# Patient Record
Sex: FEMALE | Race: BLACK OR AFRICAN AMERICAN | NOT HISPANIC OR LATINO | Employment: OTHER | URBAN - METROPOLITAN AREA
[De-identification: names, ages, dates, MRNs, and addresses within clinical notes are randomized per-mention and may not be internally consistent; named-entity substitution may affect disease eponyms.]

---

## 2017-09-06 ENCOUNTER — TELEPHONE (OUTPATIENT)
Dept: PAIN MEDICINE | Facility: CLINIC | Age: 57
End: 2017-09-06

## 2017-09-06 ENCOUNTER — OFFICE VISIT (OUTPATIENT)
Dept: PAIN MEDICINE | Facility: CLINIC | Age: 57
End: 2017-09-06
Payer: MEDICARE

## 2017-09-06 ENCOUNTER — HOSPITAL ENCOUNTER (OUTPATIENT)
Dept: RADIOLOGY | Facility: HOSPITAL | Age: 57
Discharge: HOME | End: 2017-09-06
Attending: ANESTHESIOLOGY
Payer: MEDICARE

## 2017-09-06 VITALS — HEIGHT: 68 IN | DIASTOLIC BLOOD PRESSURE: 84 MMHG | HEART RATE: 58 BPM | SYSTOLIC BLOOD PRESSURE: 136 MMHG

## 2017-09-06 DIAGNOSIS — M51.36 DDD (DEGENERATIVE DISC DISEASE), LUMBAR: ICD-10-CM

## 2017-09-06 DIAGNOSIS — M47.819 FACET ARTHROPATHY: ICD-10-CM

## 2017-09-06 DIAGNOSIS — M47.819 FACET ARTHROPATHY: Primary | ICD-10-CM

## 2017-09-06 PROCEDURE — 72110 X-RAY EXAM L-2 SPINE 4/>VWS: CPT | Mod: TC

## 2017-09-06 PROCEDURE — 99999 PR PBB SHADOW E&M-NEW PATIENT-LVL III: CPT | Mod: PBBFAC,,, | Performed by: ANESTHESIOLOGY

## 2017-09-06 PROCEDURE — 72050 X-RAY EXAM NECK SPINE 4/5VWS: CPT | Mod: TC

## 2017-09-06 PROCEDURE — 72050 X-RAY EXAM NECK SPINE 4/5VWS: CPT | Mod: 26,,, | Performed by: RADIOLOGY

## 2017-09-06 PROCEDURE — 72110 X-RAY EXAM L-2 SPINE 4/>VWS: CPT | Mod: 26,,, | Performed by: RADIOLOGY

## 2017-09-06 PROCEDURE — 99204 OFFICE O/P NEW MOD 45 MIN: CPT | Mod: S$GLB,,, | Performed by: ANESTHESIOLOGY

## 2017-09-06 RX ORDER — KETOCONAZOLE 20 MG/G
CREAM TOPICAL DAILY
COMMUNITY
End: 2018-06-18 | Stop reason: SDUPTHER

## 2017-09-06 RX ORDER — ASPIRIN 81 MG/1
81 TABLET ORAL DAILY
COMMUNITY

## 2017-09-06 RX ORDER — NEOMYCIN SULFATE, POLYMYXIN B SULFATE AND DEXAMETHASONE 3.5; 10000; 1 MG/ML; [USP'U]/ML; MG/ML
1 SUSPENSION/ DROPS OPHTHALMIC
COMMUNITY
End: 2019-10-31

## 2017-09-06 RX ORDER — ATORVASTATIN CALCIUM 10 MG/1
10 TABLET, FILM COATED ORAL DAILY
COMMUNITY
End: 2019-10-31 | Stop reason: SDUPTHER

## 2017-09-06 RX ORDER — BACLOFEN 10 MG/1
10 TABLET ORAL 3 TIMES DAILY
COMMUNITY
End: 2018-06-18 | Stop reason: SDUPTHER

## 2017-09-06 RX ORDER — OXYBUTYNIN CHLORIDE 15 MG/1
5 TABLET, EXTENDED RELEASE ORAL DAILY
COMMUNITY
End: 2019-10-31 | Stop reason: SDUPTHER

## 2017-09-06 RX ORDER — METFORMIN HYDROCHLORIDE 750 MG/1
750 TABLET, EXTENDED RELEASE ORAL
COMMUNITY
End: 2019-10-31 | Stop reason: SDUPTHER

## 2017-09-06 RX ORDER — LISINOPRIL 2.5 MG/1
2.5 TABLET ORAL DAILY
COMMUNITY
End: 2019-10-31 | Stop reason: SDUPTHER

## 2017-09-06 RX ORDER — NAPROXEN 500 MG/1
500 TABLET ORAL 2 TIMES DAILY
Status: ON HOLD | COMMUNITY
End: 2018-07-05

## 2017-09-06 NOTE — PROGRESS NOTES
This note was completed with dictation software and grammatical errors may exist.    Referring Physician: Self, Aaareferral    PCP: Katya Hummel MD      CC: neck and lower back pain    HPI:   Martha Miguel is a 56 y.o. female referred with neck and lower back pain.  Lower back pain is more bothersome.  Pain is present over 10 years.  She has constant aching, grabbing, deep pain in her lower back.  Pain radiates to her bilateral hips.  Pain worsens standing, walking, lifting, getting up.  Pain radiates up to her mid back and causes neck pain at times.  Pain improves with rest.  She takes baclofen, naproxen with mild benefits.  She has not had any updated imaging of her neck and lumbar spine.  She has not tried physical therapy.  She denies any weakness.  No bowel bladder changes.  She rates her pain 4/10 today, 8/10 on average.    ROS:  CONSTITUTIONAL: No fevers, chills, night sweats, wt. loss, appetite changes  SKIN: no rashes or itching  ENT: No headaches, head trauma, vision changes, or eye pain  LYMPH NODES: None noticed   CV: No chest pain, palpitations.   RESP: No shortness of breath, dyspnea on exertion, cough, wheezing, or hemoptysis  GI: No nausea, emesis, diarrhea, constipation, melena, hematochezia, pain.    : No dysuria, hematuria, urgency, or frequency   HEME: No easy bruising, bleeding problems  PSYCHIATRIC: No depression, anxiety, psychosis, hallucinations.  NEURO: No seizures, memory loss, dizziness or difficulty sleeping  MSK: + History of present illness      Past Medical History:   Diagnosis Date    Allergy     Arthritis     COPD (chronic obstructive pulmonary disease)     Coronary artery disease     Depression     Diabetes mellitus, type 2      Past Surgical History:   Procedure Laterality Date    CHOLECYSTECTOMY      FRACTURE SURGERY      HYSTERECTOMY      TONSILLECTOMY       Family History   Problem Relation Age of Onset    Family history unknown: Yes     Social History  "    Social History    Marital status:      Spouse name: N/A    Number of children: N/A    Years of education: N/A     Social History Main Topics    Smoking status: Never Smoker    Smokeless tobacco: Never Used    Alcohol use No    Drug use: No    Sexual activity: No     Other Topics Concern    None     Social History Narrative    None         Medications/Allergies: See med card    Vitals:    09/06/17 1036   BP: 136/84   Pulse: (!) 58   Height: 5' 8" (1.727 m)   PainSc:   2   PainLoc: Back         Physical exam:    GENERAL: A and O x3, the patient appears well groomed and is in no acute distress.  Skin: No rashes or obvious lesions  HEENT: normocephalic, atraumatic  CARDIOVASCULAR:  Palpable peripheral pulses  LUNGS: easy work of breathing  ABDOMEN: soft, nontender   UPPER EXTREMITIES: Normal alignment, normal range of motion, no atrophy, no skin changes,  hair growth and nail growth normal and equal bilaterally. No swelling, no tenderness.    LOWER EXTREMITIES:  Normal alignment, normal range of motion, no atrophy, no skin changes,  hair growth and nail growth normal and equal bilaterally. No swelling, no tenderness.  CERVICAL SPINE:  Cervical spine: ROM is full in flexion, extension and lateral rotation with mild increased pain.  Spurling's maneuver causes no neck pain to either side.  Myofascial exam: No Tenderness to palpation across cervical paraspinous region bilaterally.    LUMBAR SPINE  Lumbar spine: ROM is full with flexion extension and oblique extension with moderate increased pain.    Manuel's test causes no increased pain on either side.    Supine straight leg raise is negative bilaterally.    Internal and external rotation of the hip causes no increased pain on either side.  Myofascial exam: No tenderness to palpation across lumbar paraspinous muscles.      MENTAL STATUS: normal orientation, speech, language, and fund of knowledge for social situation.  Emotional state " appropriate.    CRANIAL NERVES:  II:  PERRL bilaterally,   III,IV,VI: EOMI.    V:  Facial sensation equal bilaterally  VII:  Facial motor function normal.  VIII:  Hearing equal to finger rub bilaterally  IX/X: Gag normal, palate symmetric  XI:  Shoulder shrug equal, head turn equal  XII:  Tongue midline without fasciculations      MOTOR: Tone and bulk: normal bilateral upper and lower Strength: normal   Delt Bi Tri WE WF     R 5 5 5 5 5 5   L 5 5 5 5 5 5     IP ADD ABD Quad TA Gas HAM  R 5 5 5 5 5 5 5  L 5 5 5 5 5 5 5    SENSATION: Light touch and pinprick intact bilaterally  REFLEXES: normal, symmetric, nonbrisk.  Toes down, no clonus. No hoffmans.  GAIT: normal rise, base, steps, and arm swing.        Imaging:  None    Assessment:  Patient referred for neck and lower back pain  1. Facet arthropathy    2. DDD (degenerative disc disease), lumbar          Plan:  - I have stressed the importance of physical activity and exercise to improve overall health. Referral given for formal PT  - Continue baclofen and naproxen as prescribed  - Schedule xrays of lumbar and cervical spine  - I believe her low back pain maybe due to facet arthropathy and have recommended lumbar medial branch blocks as a diagnostic procedure.  If successful, would proceed with radiofrequency ablation.  She will consider these options  - Follow up after trial of PT      Thank you for referring this interesting patient, and I look forward to continuing to collaborate in her care.

## 2017-09-13 ENCOUNTER — TELEPHONE (OUTPATIENT)
Dept: PAIN MEDICINE | Facility: CLINIC | Age: 57
End: 2017-09-13

## 2017-09-13 NOTE — TELEPHONE ENCOUNTER
----- Message from Marta Lubin sent at 9/13/2017 12:37 PM CDT -----  Contact: Sridevi from [facility] [phone]  Sridevi needs call back to clarify orders for physical therapy for this patient.call back [phone]

## 2018-06-18 ENCOUNTER — OFFICE VISIT (OUTPATIENT)
Dept: PAIN MEDICINE | Facility: CLINIC | Age: 58
End: 2018-06-18
Payer: MEDICARE

## 2018-06-18 VITALS
WEIGHT: 293 LBS | HEART RATE: 60 BPM | DIASTOLIC BLOOD PRESSURE: 79 MMHG | HEIGHT: 68 IN | BODY MASS INDEX: 44.41 KG/M2 | SYSTOLIC BLOOD PRESSURE: 132 MMHG

## 2018-06-18 DIAGNOSIS — M47.819 FACET ARTHROPATHY: Primary | ICD-10-CM

## 2018-06-18 DIAGNOSIS — M51.36 DDD (DEGENERATIVE DISC DISEASE), LUMBAR: ICD-10-CM

## 2018-06-18 PROCEDURE — 99214 OFFICE O/P EST MOD 30 MIN: CPT | Mod: S$GLB,,, | Performed by: PHYSICIAN ASSISTANT

## 2018-06-18 PROCEDURE — 3008F BODY MASS INDEX DOCD: CPT | Mod: CPTII,S$GLB,, | Performed by: PHYSICIAN ASSISTANT

## 2018-06-18 PROCEDURE — 99999 PR PBB SHADOW E&M-EST. PATIENT-LVL III: CPT | Mod: PBBFAC,,, | Performed by: PHYSICIAN ASSISTANT

## 2018-06-18 RX ORDER — CARVEDILOL 3.12 MG/1
12.5 TABLET ORAL 2 TIMES DAILY WITH MEALS
COMMUNITY
End: 2019-10-31 | Stop reason: SDUPTHER

## 2018-06-18 NOTE — PROGRESS NOTES
Referring Physician: No ref. provider found    PCP: Katya Hummel MD      CC: neck and lower back pain      Interval History:  Martha Miguel is a 57 y.o. female with neck and low back pain who presents today for f/u. [facility] physical therapy was ordered for 8 weeks. She went to 3 visits but could not tolerate physical therapy. She is apprehensive to try medial branch blocks because she has heard they are bad. She denies any changes in symptoms. Pain is chronic. Pain is throbbing in nature and worse in low back, midline. Denies any worsening symptoms. Denies b/b changes. Denies LE weakness. Pain today is rated 8/10.  Pt has been seen in the clinic before, however pt is new to me.     History below per Dr. Orellana    HPI:   Martha Miguel is a 57 y.o. female referred with neck and lower back pain.  Lower back pain is more bothersome.  Pain is present over 10 years.  She has constant aching, grabbing, deep pain in her lower back.  Pain radiates to her bilateral hips.  Pain worsens standing, walking, lifting, getting up.  Pain radiates up to her mid back and causes neck pain at times.  Pain improves with rest.  She takes baclofen, naproxen with mild benefits.  She has not had any updated imaging of her neck and lumbar spine.  She has not tried physical therapy.  She denies any weakness.  No bowel bladder changes.  She rates her pain 4/10 today, 8/10 on average.    ROS:  CONSTITUTIONAL: No fevers, chills, night sweats, wt. loss, appetite changes  SKIN: no rashes or itching  ENT: No headaches, head trauma, vision changes, or eye pain  LYMPH NODES: None noticed   CV: No chest pain, palpitations.   RESP: No shortness of breath, dyspnea on exertion, cough, wheezing, or hemoptysis  GI: No nausea, emesis, diarrhea, constipation, melena, hematochezia, pain.    : No dysuria, hematuria, urgency, or frequency   HEME: No easy bruising, bleeding problems  PSYCHIATRIC: No depression, anxiety, psychosis, hallucinations.  NEURO: No  "seizures, memory loss, dizziness or difficulty sleeping  MSK: + History of present illness      Past Medical History:   Diagnosis Date    Allergy     Arthritis     COPD (chronic obstructive pulmonary disease)     Coronary artery disease     Depression     Diabetes mellitus, type 2      Past Surgical History:   Procedure Laterality Date    CHOLECYSTECTOMY      FRACTURE SURGERY      HYSTERECTOMY      TONSILLECTOMY       Family History   Problem Relation Age of Onset    Family history unknown: Yes     Social History     Social History    Marital status:      Spouse name: N/A    Number of children: N/A    Years of education: N/A     Social History Main Topics    Smoking status: Never Smoker    Smokeless tobacco: Never Used    Alcohol use No    Drug use: No    Sexual activity: No     Other Topics Concern    None     Social History Narrative    None         Medications/Allergies: See med card    Vitals:    06/18/18 1336   BP: 132/79   Pulse: 60   Weight: (!) 165.6 kg (365 lb)   Height: 5' 8" (1.727 m)   PainSc:   8   PainLoc: Back         Physical exam:    GENERAL: A and O x3, the patient appears well groomed and is in no acute distress.  Skin: No rashes or obvious lesions  HEENT: normocephalic, atraumatic  CARDIOVASCULAR:  RRR  LUNGS: non labored breathing  ABDOMEN: soft, nontender   UPPER EXTREMITIES: Normal alignment, normal range of motion, no atrophy, no skin changes,  hair growth and nail growth normal and equal bilaterally. No swelling, no tenderness.    LOWER EXTREMITIES:  Normal alignment, normal range of motion, no atrophy, no skin changes,  hair growth and nail growth normal and equal bilaterally. No swelling, no tenderness.  CERVICAL SPINE:  Cervical spine: ROM is full in flexion, extension and lateral rotation with mild increased pain.  Spurling's maneuver causes no neck pain to either side.  Myofascial exam: mild Tenderness to palpation across cervical paraspinous region " bilaterally.    LUMBAR SPINE  Lumbar spine: ROM is full with flexion extension and oblique extension with moderate increased pain.    Manuel's test causes no increased pain on either side.    Supine straight leg raise is negative bilaterally.    Internal and external rotation of the hip causes no increased pain on either side.  Myofascial exam: moderate tenderness to palpation across lumbar paraspinous muscles.      MENTAL STATUS: normal orientation, speech, language, and fund of knowledge for social situation.  Emotional state appropriate.    CRANIAL NERVES:  II:  PERRL bilaterally,   III,IV,VI: EOMI.    V:  Facial sensation equal bilaterally  VII:  Facial motor function normal.  VIII:  Hearing equal to finger rub bilaterally  IX/X: Gag normal, palate symmetric  XI:  Shoulder shrug equal, head turn equal  XII:  Tongue midline without fasciculations      MOTOR: Tone and bulk: normal bilateral upper and lower Strength: normal   Delt Bi Tri WE WF     R 5 5 5 5 5 5   L 5 5 5 5 5 5     IP ADD ABD Quad TA Gas HAM  R 5 5 5 5 5 5 5  L 5 5 5 5 5 5 5    SENSATION: Light touch and pinprick intact bilaterally  REFLEXES: normal, symmetric, nonbrisk.  Toes down, no clonus. No hoffmans.  GAIT: normal rise, base, steps, and arm swing.        Imagin17  Lumbar xray  Moderate marginal vertebral spurring is present at lower thoracic and upper lumbar levels.  Moderate facet arthropathy is present at mid to lower lumbar levels.  Mild loss of disc height is present at T12-L1 and L1-L2.  No pars defect is seen.  Surgical clips are present in the right upper quadrant.    Cervical xray  Mild degenerative cervical spondylosis.  Assessment:  Martha Miguel is a 57 y.o. female with neck and lower back pain  1. Facet arthropathy    2. DDD (degenerative disc disease), lumbar      Plan:  1. I have stressed the importance of physical activity and exercise to improve overall health. Home exercises provided  2. Recommend formal PT.  Declines at this time  3. Reviewed imaging with Ms. Miguel  4.  I believe her low back pain maybe due to facet arthropathy and have recommended lumbar medial branch blocks as a diagnostic procedure.  If successful, would proceed with radiofrequency ablation.  I have explained the risks, benefits, and alternatives of the procedure in detail. The patient voices understanding and all questions have been answered. The patient agrees to proceed as planned. Written Consent obtained.   5. Will call s/p MARION

## 2018-06-18 NOTE — PATIENT INSTRUCTIONS
Exercises To Strengthen Your Lower Back  Strong lower-back and abdominal muscles work together to support your spine. These exercises will help strengthen the muscles of the lower back. It is important that you begin exercising slowly and increase levels gradually.  Always begin any exercise program with stretching. If you feel pain while doing any of these exercises, stop and talk to your doctor about a more specific exercise program that suits your condition better.  Low Back Stretch  · Lie on your back with your knees bent and both feet on the ground.  ·   Slowly raise your left knee to your chest as you flatten your lower back against the floor. Hold for 5 seconds.  · Relax and repeat the exercise with your right knee.  · Do 10 of these exercises for each leg.  · Repeat hugging both knees to your chest at the same time.  Building Lower Back Strength  1. Kneeling Lumbar Extension: Begin on your hands and knees. Simultaneously raise and straighten your right arm and left leg until they are parallel to the ground. Hold for 2 seconds and come back slowly to a starting position. Repeat with left arm and right leg, alternating 10 times.  2. Prone Lumbar Extension: Lie face down, arms extended overhead, palms on the floor. Simultaneously raise your right arm and left leg as high as comfortably possible. Hold for 10 seconds and slowly return to start. Repeat with left arm and right leg, alternating 10 times. Gradually build up to 20 times. (Advanced: Repeat this exercise raising both arms and both legs a few inches off the floor at the same time. Hold for 5 seconds and release.)  3. Pelvic Tilt: Lie on the floor on your back with your knees bent at 90 degrees. Your feet should be flat on the floor. Inhale, exhale, then slowly contract your abdominal muscles bringing your navel toward your spine. Let your pelvis rock back until your lower back is flat on the floor. Hold for 10 seconds while breathing  smoothly.  4. Abdominal Crunch: Perform a Pelvic Tilt (above) flattening your lower back against the floor. Holding the tension in your abdominal muscles, take another breath and raise your shoulder blades off the ground (this is not a full sit-up). Keep your head in line with your body (dont bend your neck forward). Hold for 2 seconds, then slowly lower.      Back Exercises: Abdominal Lift  The Abdominal Lift strengthens your lower abdominal muscles, helping you keep your pelvis and back stable.  · Lie on the floor with both knees bent. Put your feet flat on the floor and your arms by your sides. Tighten your abdominal muscles.  · Lift one bent knee and move it toward your upper body. Keep your abdominal muscles tight and your back flat on the floor. Hold for 10 seconds.  · Repeat 3 times. Then, switch legs.         Back Exercises: Bridge  The Bridge exercise strengthens your abdominal, buttocks, and hamstring muscles. This helps keep your back stable and aligned when you walk.  · Lie on the floor with your back and palms flat. Bend your knees. Keep your feet flat on the floor.  · Contract your abdominal and buttocks muscles. Slowly lift your buttocks off the floor until there is a straight line from your knees to your shoulders.  · Hold for 5  seconds. Repeat 10 times.          Back Exercises: Hip Lift        To start, lie on your back with your knees bent and feet flat on the floor. Dont press your neck or lower back to the floor. Breathe deeply. You should feel comfortable and relaxed in this position.  · Slowly raise your hips upward.  · Tighten your abdomen and buttocks. Be careful not to arch your back.  · Hold for 5 seconds. Lower your hips to the floor.  · Repeat 10 times.  For your safety, check with your healthcare provider before starting an exercise program.       Back Exercises: Hip Rotator Stretch        To start, lie on your back with your knees bent and feet flat on the floor. Dont press your  neck or lower back to the floor. Breathe deeply. You should feel comfortable and relaxed in this position.  · Rest your right ankle on your left knee.  · Place a towel behind your left thigh and use it to pull the knee toward your chest. Feel the stretch in your buttocks.  · Hold for 30-60 seconds. Release.  · Repeat 2 times.  · Switch legs.   For your safety, check with your healthcare provider before starting an exercise program.       Back Exercises: Knee Lift        To start, lie on your back with your knees bent and feet flat on the floor. Dont press your neck or lower back to the floor. Breathe deeply. You should feel comfortable and relaxed in this position.  · Lift one bent knee and move it toward your upper body. Keep your abdominal muscles tight and your back flat on the floor.  · Hold for 10 seconds. Return to start position.  · Repeat 3 times.  · Switch legs.        Back Exercises: Leg Pull        To start, lie on your back with your knees bent and feet flat on the floor. Dont press your neck or lower back to the floor. Breathe deeply. You should feel comfortable and relaxed in this position.  · Pull one knee to your chest.  · Hold for 30-60 seconds. Return to starting position.  · Repeat 2 times.  · Switch legs.  · For a double leg pull, pull both legs to your chest at the same time. Repeat 2 times.  For your safety, check with your healthcare provider before starting an exercise program.       Back Exercises: Leg Reach        Do this exercise on your hands and knees. Keep your knees under your hips and your hands under your shoulders. Keep your spine in a neutral position (not arched or sagging). Be sure to maintain your necks natural curve.  · Extend one leg straight back. Dont arch your back or let your head or body sag.  · Hold for 5 seconds. Return to starting position.  · Repeat 5 times.  · Switch legs.       Back Exercises: Lower Back Rotation  To start, lie on your back with your knees bent  and feet flat on the floor. Dont press your neck or lower back to the floor. Breathe deeply. You should feel comfortable and relaxed in this position.  · Drop both knees to one side. Turn your head to the other side. Keep your shoulders flat on the floor.  · Hold for 20 seconds.  · Slowly switch sides.  · Repeat 2 times.                                   Back Exercises: Lower Back Stretch                        To start, sit in a chair with your feet flat on the floor. Shift your weight slightly forward to avoid rounding your back. Relax, and keep your ears, shoulders, and hips aligned.  · Sit with your feet well apart.  · Bend forward and touch the floor with the backs of your hands. Relax and let your body drop.  · Hold for 20 seconds. Return to starting position.  · Repeat 2 times.       Back Exercises: Partial Curl-Ups        To start, lie on your back with your knees bent and feet flat on the floor. Dont press your neck or lower back to the floor. Breathe deeply. You should feel comfortable and relaxed in this position.  · Cross your arms loosely.  · Tighten your abdomen and curl senior care up, keeping your head in line with your shoulders.  · Hold for 5 seconds. Uncurl to lie down.  · Repeat 5 times.       Back Exercises: Pelvic Tilt  To start, lie on your back with your knees bent and feet flat on the floor. Dont press your neck or lower back to the floor. Breathe deeply. You should feel comfortable and relaxed in this position.  · Tighten your abdomen and buttocks, and press your lower back toward the floor. This should be a small, subtle movement.  · Hold for 5 seconds. Release.  · Repeat 5 times.                           Back Exercises: Seated Rotation      To start, sit in a chair with your feet flat on the floor. Shift your weight slightly forward to avoid rounding your back. Relax, and keep your ears, shoulders, and hips aligned.  · Fold your arms, elbows just below shoulder height.  · Turn from the  waist with hips forward. Turn your head last.  · Hold for a count of 5. Return to starting position.  · Repeat 5 times on one side. Then switch sides.          Back Exercises: Side Stretch  To start, sit in a chair with your feet flat on the floor. Shift your weight slightly forward to avoid rounding your back. Relax. Keep your ears, shoulders, and hips aligned.  · Stretch your right arm overhead.  · Slowly bend to the left. Dont twist your torso.  · Hold for 20 seconds. Return to starting position.  · Repeat 2 times. Then, switch to the other side.      © 8826-3098 [employer]. [address] [place] [address], [place] [address]. All rights reserved. This information is not intended as a substitute for professional medical care. Always follow your healthcare professional's instructions.

## 2018-06-21 DIAGNOSIS — M47.896 OTHER SPONDYLOSIS, LUMBAR REGION: Primary | ICD-10-CM

## 2018-07-05 ENCOUNTER — SURGERY (OUTPATIENT)
Age: 58
End: 2018-07-05

## 2018-07-05 ENCOUNTER — HOSPITAL ENCOUNTER (OUTPATIENT)
Facility: AMBULARY SURGERY CENTER | Age: 58
Discharge: HOME | End: 2018-07-05
Attending: ANESTHESIOLOGY | Admitting: ANESTHESIOLOGY
Payer: MEDICARE

## 2018-07-05 DIAGNOSIS — M47.896 OTHER SPONDYLOSIS, LUMBAR REGION: Primary | ICD-10-CM

## 2018-07-05 LAB — POCT GLUCOSE: 112 MG/DL (ref 70–110)

## 2018-07-05 PROCEDURE — 64494 INJ PARAVERT F JNT L/S 2 LEV: CPT | Mod: RT | Performed by: ANESTHESIOLOGY

## 2018-07-05 PROCEDURE — 64495 INJ PARAVERT F JNT L/S 3 LEV: CPT | Mod: LT | Performed by: ANESTHESIOLOGY

## 2018-07-05 PROCEDURE — 64493 INJ PARAVERT F JNT L/S 1 LEV: CPT | Mod: LT | Performed by: ANESTHESIOLOGY

## 2018-07-05 PROCEDURE — 64494 INJ PARAVERT F JNT L/S 2 LEV: CPT | Mod: 50,,, | Performed by: ANESTHESIOLOGY

## 2018-07-05 PROCEDURE — 64493 INJ PARAVERT F JNT L/S 1 LEV: CPT | Mod: 50,,, | Performed by: ANESTHESIOLOGY

## 2018-07-05 PROCEDURE — 99152 MOD SED SAME PHYS/QHP 5/>YRS: CPT | Mod: ,,, | Performed by: ANESTHESIOLOGY

## 2018-07-05 RX ORDER — BUPIVACAINE HYDROCHLORIDE 5 MG/ML
INJECTION, SOLUTION EPIDURAL; INTRACAUDAL
Status: DISPENSED
Start: 2018-07-05 | End: 2018-07-06

## 2018-07-05 RX ORDER — BUPIVACAINE HYDROCHLORIDE 5 MG/ML
INJECTION, SOLUTION EPIDURAL; INTRACAUDAL
Status: DISCONTINUED | OUTPATIENT
Start: 2018-07-05 | End: 2018-07-05 | Stop reason: HOSPADM

## 2018-07-05 RX ORDER — SODIUM CHLORIDE, SODIUM LACTATE, POTASSIUM CHLORIDE, CALCIUM CHLORIDE 600; 310; 30; 20 MG/100ML; MG/100ML; MG/100ML; MG/100ML
INJECTION, SOLUTION INTRAVENOUS ONCE AS NEEDED
Status: DISCONTINUED | OUTPATIENT
Start: 2018-07-05 | End: 2018-07-05 | Stop reason: HOSPADM

## 2018-07-05 RX ADMIN — BUPIVACAINE HYDROCHLORIDE 5 ML: 5 INJECTION, SOLUTION EPIDURAL; INTRACAUDAL at 09:07

## 2018-07-05 NOTE — DISCHARGE SUMMARY
[facility]  Discharge Note  Short Stay    Admit Date: 7/5/2018    Discharge Date and Time: 7/5/2018    Attending Physician: Arthur Orellana MD     Discharge Provider: Arthur Orellana    Diagnoses:  Active Hospital Problems    Diagnosis  POA    Other spondylosis, lumbar region [M47.896]  Yes      Resolved Hospital Problems    Diagnosis Date Resolved POA   No resolved problems to display.       Hospital Course: Lumbar MBB  Discharged Condition: Good    Final Diagnoses:   Active Hospital Problems    Diagnosis  POA    Other spondylosis, lumbar region [M47.896]  Yes      Resolved Hospital Problems    Diagnosis Date Resolved POA   No resolved problems to display.       Disposition: Home or Self Care    Follow up/Patient Instructions:    Medications:  Reconciled Home Medications:      Medication List      CONTINUE taking these medications    aspirin 81 MG EC tablet  Commonly known as:  ECOTRIN  Take 81 mg by mouth once daily.     atorvastatin 10 MG tablet  Commonly known as:  LIPITOR  Take 10 mg by mouth once daily.     carvedilol 12.5 MG tablet  Commonly known as:  COREG  Take 12.5 mg by mouth 2 (two) times daily with meals.     lisinopril 2.5 MG tablet  Commonly known as:  PRINIVIL,ZESTRIL  Take 2.5 mg by mouth once daily.     metFORMIN 500 MG tablet  Commonly known as:  GLUCOPHAGE  Take 500 mg by mouth 2 (two) times daily with meals.     neomycin-polymyxin-dexamethasone 3.5mg/mL-10,000 unit/mL-0.1 % Drps  Commonly known as:  MAXITROL  1 drop every 3 (three) hours.     oxybutynin 15 MG Tr24  Commonly known as:  DITROPAN XL  Take 5 mg by mouth once daily.        STOP taking these medications    naproxen 500 MG tablet  Commonly known as:  NAPROSYN            Discharge Procedure Orders  Call MD for:  temperature >100.4     Call MD for:  persistent nausea and vomiting or diarrhea     Call MD for:  severe uncontrolled pain     Call MD for:  redness, tenderness, or signs of infection (pain, swelling, redness, odor or  green/yellow discharge around incision site)     Call MD for:  difficulty breathing or increased cough     Call MD for:  severe persistent headache          Follow up with MD in 2-3 weeks    Discharge Procedure Orders (must include Diet, Follow-up, Activity):    Discharge Procedure Orders (must include Diet, Follow-up, Activity)  Call MD for:  temperature >100.4     Call MD for:  persistent nausea and vomiting or diarrhea     Call MD for:  severe uncontrolled pain     Call MD for:  redness, tenderness, or signs of infection (pain, swelling, redness, odor or green/yellow discharge around incision site)     Call MD for:  difficulty breathing or increased cough     Call MD for:  severe persistent headache

## 2018-07-05 NOTE — DISCHARGE INSTRUCTIONS
Nerve Block  This was a test, not a treatment. Your pain may return.  Keep your pain journal Dr office will call to check your pain levels within 3 days   Perform activities that normally cause you pain during this testing period    Home care instructions  You may apply ice pack to the injection site for 2 days , NO HEAT for 2 days  You may take a shower but no soaking above level of injection in tub or pool for 2 days  Resume Aspirin, Plavix, or Coumadin the day after the procedure unless otherwise instructed.  Do not drive for 24 hr      SEEK  IMMEDIATE MEDICAL HELP FOR:  Severe increase in your usual pain or appearance of new pain  Prolonged  ( more than 8 hours)or increasing weakness or numbness in the legs or arms  Drainage, redness, active bleeding, or increased swelling at the injection site  Temperature over 100.0 degrees F.  Headache that increases when your head is upright and decreases when you lie flat  Shortness of breath, chest pain, or problems breathing

## 2018-07-05 NOTE — OP NOTE
PROCEDURE DATE: 7/5/2018    PROCEDURE:  Bilateral L2,3,4,5 medial branch nerve blocks    DIAGNOSIS:  Other lumbar spondylosis    Post Op diagnosis: Same    PHYSICIAN: Arthur Orellana MD    MEDICATIONS INJECTED: 0.5% bupivicaine, 0.5 ml at each level    SEDATION MEDICATIONS:RN IV Versed    LOCAL ANESTHETIC USED: None    ESTIMATED BLOOD LOSS:  None    COMPLICATIONS:  None    TECHNIQUE: A time out was taken to identify the patient, procedure and side of the procedure. The patient was placed in a prone position, then prepped and draped in the usual sterile fashion using ChloraPrep and sterile towels.  The levels were determined under fluoroscopic guidance and then marked.  A 25-gauge 5 inch needle was introduced to the anatomic location of the L2,3,4,5 medial branch nerves on the bilateral side. The above medication was then injected. The patient tolerated the procedure well.     The patient was monitored after the procedure. Patient was given pain diary to record pain levels at home.     If found to have greater than a 50% recovery and so will be scheduled for a radiofrequency ablation of the corresponding nerves.  Patient was given post procedure and discharge instructions to follow at home.  The patient was discharged in a stable condition.

## 2018-07-06 VITALS
TEMPERATURE: 98 F | DIASTOLIC BLOOD PRESSURE: 88 MMHG | WEIGHT: 293 LBS | RESPIRATION RATE: 18 BRPM | OXYGEN SATURATION: 94 % | HEART RATE: 55 BPM | BODY MASS INDEX: 44.41 KG/M2 | SYSTOLIC BLOOD PRESSURE: 131 MMHG | HEIGHT: 68 IN

## 2018-07-11 ENCOUNTER — TELEPHONE (OUTPATIENT)
Dept: PAIN MEDICINE | Facility: CLINIC | Age: 58
End: 2018-07-11

## 2018-07-11 NOTE — TELEPHONE ENCOUNTER
Patient reports 90% or greater decrease in pain following Medial Branch Block. Patient wishes to wait to schedule second Medial Branch Block and will call back when ready to schedule. Patient accepted and voiced understanding.

## 2019-07-23 DIAGNOSIS — Z12.39 SCREENING BREAST EXAMINATION: Primary | ICD-10-CM

## 2019-08-07 ENCOUNTER — HOSPITAL ENCOUNTER (OUTPATIENT)
Dept: RADIOLOGY | Facility: HOSPITAL | Age: 59
Discharge: HOME | End: 2019-08-07
Attending: FAMILY MEDICINE
Payer: MEDICARE

## 2019-08-07 VITALS — WEIGHT: 293 LBS | BODY MASS INDEX: 44.41 KG/M2 | HEIGHT: 68 IN

## 2019-08-07 DIAGNOSIS — Z12.39 SCREENING BREAST EXAMINATION: ICD-10-CM

## 2019-08-07 PROCEDURE — 77067 SCR MAMMO BI INCL CAD: CPT | Mod: TC

## 2019-10-09 ENCOUNTER — TELEPHONE (OUTPATIENT)
Dept: FAMILY MEDICINE | Facility: CLINIC | Age: 59
End: 2019-10-09

## 2019-10-09 NOTE — TELEPHONE ENCOUNTER
From [facility] action:   KHALIDA MIRANDA 4/11/2019 5:07:24 PM > fasting labs    Spoke to patient that fasting lab is due about a week prior to 10/31 ov. Verbalized understanding. Remind me created.

## 2019-10-26 LAB
ALBUMIN SERPL-MCNC: 4.1 G/DL (ref 3.6–5.1)
ALBUMIN/CREAT UR: 2 MCG/MG CREAT
ALBUMIN/GLOB SERPL: 1.5 (CALC) (ref 1–2.5)
ALP SERPL-CCNC: 64 U/L (ref 33–130)
ALT SERPL-CCNC: 14 U/L (ref 6–29)
APPEARANCE UR: ABNORMAL
AST SERPL-CCNC: 13 U/L (ref 10–35)
BACTERIA #/AREA URNS HPF: ABNORMAL /HPF
BACTERIA UR CULT: ABNORMAL
BACTERIA UR CULT: ABNORMAL
BILIRUB SERPL-MCNC: 0.4 MG/DL (ref 0.2–1.2)
BILIRUB UR QL STRIP: NEGATIVE
BUN SERPL-MCNC: 17 MG/DL (ref 7–25)
BUN/CREAT SERPL: ABNORMAL (CALC) (ref 6–22)
CALCIUM SERPL-MCNC: 9.5 MG/DL (ref 8.6–10.4)
CHLORIDE SERPL-SCNC: 106 MMOL/L (ref 98–110)
CHOLEST SERPL-MCNC: 132 MG/DL
CHOLEST/HDLC SERPL: 2.8 (CALC)
CO2 SERPL-SCNC: 26 MMOL/L (ref 20–32)
COLOR UR: YELLOW
CREAT SERPL-MCNC: 0.95 MG/DL (ref 0.5–0.99)
CREAT UR-MCNC: 216 MG/DL (ref 20–275)
EXTRA LAVENDER TOP TUBE: NORMAL
GFRSERPLBLD MDRD-ARVRAT: 65 ML/MIN/1.73M2
GLOBULIN SER CALC-MCNC: 2.7 G/DL (CALC) (ref 1.9–3.7)
GLUCOSE SERPL-MCNC: 105 MG/DL (ref 65–99)
GLUCOSE UR QL STRIP: NEGATIVE
HDLC SERPL-MCNC: 48 MG/DL
HGB UR QL STRIP: NEGATIVE
HYALINE CASTS #/AREA URNS LPF: ABNORMAL /LPF
KETONES UR QL STRIP: NEGATIVE
LDLC SERPL CALC-MCNC: 64 MG/DL (CALC)
LEUKOCYTE ESTERASE UR QL STRIP: ABNORMAL
MICROALBUMIN UR-MCNC: 0.5 MG/DL
NITRITE UR QL STRIP: NEGATIVE
NONHDLC SERPL-MCNC: 84 MG/DL (CALC)
PH UR STRIP: ABNORMAL [PH] (ref 5–8)
POTASSIUM SERPL-SCNC: 4.1 MMOL/L (ref 3.5–5.3)
PROT SERPL-MCNC: 6.8 G/DL (ref 6.1–8.1)
PROT UR QL STRIP: NEGATIVE
RBC #/AREA URNS HPF: ABNORMAL /HPF
SODIUM SERPL-SCNC: 142 MMOL/L (ref 135–146)
SP GR UR STRIP: 1.03 (ref 1–1.03)
SQUAMOUS #/AREA URNS HPF: ABNORMAL /HPF
TRIGL SERPL-MCNC: 118 MG/DL
TSH SERPL-ACNC: 2.26 MIU/L (ref 0.4–4.5)
WBC #/AREA URNS HPF: ABNORMAL /HPF

## 2019-10-28 ENCOUNTER — TELEPHONE (OUTPATIENT)
Dept: FAMILY MEDICINE | Facility: CLINIC | Age: 59
End: 2019-10-28

## 2019-10-30 PROBLEM — M54.31 RIGHT SIDED SCIATICA: Status: ACTIVE | Noted: 2018-04-23

## 2019-10-30 PROBLEM — Z86.0100 HISTORY OF COLON POLYPS: Status: ACTIVE | Noted: 2019-04-11

## 2019-10-30 PROBLEM — M51.369 LUMBAR DEGENERATIVE DISC DISEASE: Status: ACTIVE | Noted: 2019-06-11

## 2019-10-30 PROBLEM — Z86.010 HISTORY OF COLON POLYPS: Status: ACTIVE | Noted: 2019-04-11

## 2019-10-30 PROBLEM — M51.36 LUMBAR DEGENERATIVE DISC DISEASE: Status: ACTIVE | Noted: 2019-06-11

## 2019-10-31 ENCOUNTER — OFFICE VISIT (OUTPATIENT)
Dept: FAMILY MEDICINE | Facility: CLINIC | Age: 59
End: 2019-10-31
Payer: MEDICARE

## 2019-10-31 VITALS
SYSTOLIC BLOOD PRESSURE: 142 MMHG | BODY MASS INDEX: 45.99 KG/M2 | HEART RATE: 68 BPM | DIASTOLIC BLOOD PRESSURE: 86 MMHG | HEIGHT: 67 IN | WEIGHT: 293 LBS

## 2019-10-31 DIAGNOSIS — N30.00 ACUTE CYSTITIS WITHOUT HEMATURIA: ICD-10-CM

## 2019-10-31 DIAGNOSIS — N39.46 MIXED STRESS AND URGE URINARY INCONTINENCE: ICD-10-CM

## 2019-10-31 DIAGNOSIS — I87.2 VENOUS INSUFFICIENCY OF LEFT LEG: ICD-10-CM

## 2019-10-31 DIAGNOSIS — I10 ESSENTIAL HYPERTENSION: ICD-10-CM

## 2019-10-31 DIAGNOSIS — Z23 NEEDS FLU SHOT: ICD-10-CM

## 2019-10-31 DIAGNOSIS — M47.896 OTHER SPONDYLOSIS, LUMBAR REGION: ICD-10-CM

## 2019-10-31 DIAGNOSIS — M15.9 PRIMARY OSTEOARTHRITIS INVOLVING MULTIPLE JOINTS: ICD-10-CM

## 2019-10-31 DIAGNOSIS — M51.36 LUMBAR DEGENERATIVE DISC DISEASE: ICD-10-CM

## 2019-10-31 DIAGNOSIS — E11.9 TYPE 2 DIABETES MELLITUS WITHOUT COMPLICATION, WITHOUT LONG-TERM CURRENT USE OF INSULIN: Primary | ICD-10-CM

## 2019-10-31 DIAGNOSIS — I71.20 THORACIC AORTIC ANEURYSM WITHOUT RUPTURE: ICD-10-CM

## 2019-10-31 LAB — HBA1C MFR BLD: 6.5 %

## 2019-10-31 PROCEDURE — G0008 ADMIN INFLUENZA VIRUS VAC: HCPCS | Mod: S$GLB,,, | Performed by: FAMILY MEDICINE

## 2019-10-31 PROCEDURE — 90682 FLU VACCINE - QUADRIVALENT (RECOMBINANT) PRESERVATIVE FREE: ICD-10-PCS | Mod: S$GLB,,, | Performed by: FAMILY MEDICINE

## 2019-10-31 PROCEDURE — 3008F BODY MASS INDEX DOCD: CPT | Mod: S$GLB,,, | Performed by: FAMILY MEDICINE

## 2019-10-31 PROCEDURE — 3044F HG A1C LEVEL LT 7.0%: CPT | Mod: S$GLB,,, | Performed by: FAMILY MEDICINE

## 2019-10-31 PROCEDURE — 83036 POCT HEMOGLOBIN A1C: ICD-10-PCS | Mod: QW,,, | Performed by: FAMILY MEDICINE

## 2019-10-31 PROCEDURE — 99214 PR OFFICE/OUTPT VISIT, EST, LEVL IV, 30-39 MIN: ICD-10-PCS | Mod: 25,S$GLB,, | Performed by: FAMILY MEDICINE

## 2019-10-31 PROCEDURE — 3077F SYST BP >= 140 MM HG: CPT | Mod: S$GLB,,, | Performed by: FAMILY MEDICINE

## 2019-10-31 PROCEDURE — 3079F DIAST BP 80-89 MM HG: CPT | Mod: S$GLB,,, | Performed by: FAMILY MEDICINE

## 2019-10-31 PROCEDURE — 3008F PR BODY MASS INDEX (BMI) DOCUMENTED: ICD-10-PCS | Mod: S$GLB,,, | Performed by: FAMILY MEDICINE

## 2019-10-31 PROCEDURE — 3044F PR MOST RECENT HEMOGLOBIN A1C LEVEL <7.0%: ICD-10-PCS | Mod: S$GLB,,, | Performed by: FAMILY MEDICINE

## 2019-10-31 PROCEDURE — 90682 RIV4 VACC RECOMBINANT DNA IM: CPT | Mod: S$GLB,,, | Performed by: FAMILY MEDICINE

## 2019-10-31 PROCEDURE — G0008 FLU VACCINE - QUADRIVALENT (RECOMBINANT) PRESERVATIVE FREE: ICD-10-PCS | Mod: S$GLB,,, | Performed by: FAMILY MEDICINE

## 2019-10-31 PROCEDURE — 3077F PR MOST RECENT SYSTOLIC BLOOD PRESSURE >= 140 MM HG: ICD-10-PCS | Mod: S$GLB,,, | Performed by: FAMILY MEDICINE

## 2019-10-31 PROCEDURE — 83036 HEMOGLOBIN GLYCOSYLATED A1C: CPT | Mod: QW,,, | Performed by: FAMILY MEDICINE

## 2019-10-31 PROCEDURE — 3079F PR MOST RECENT DIASTOLIC BLOOD PRESSURE 80-89 MM HG: ICD-10-PCS | Mod: S$GLB,,, | Performed by: FAMILY MEDICINE

## 2019-10-31 PROCEDURE — 99214 OFFICE O/P EST MOD 30 MIN: CPT | Mod: 25,S$GLB,, | Performed by: FAMILY MEDICINE

## 2019-10-31 RX ORDER — METFORMIN HYDROCHLORIDE 750 MG/1
750 TABLET, EXTENDED RELEASE ORAL
Qty: 90 TABLET | Refills: 1 | Status: SHIPPED | OUTPATIENT
Start: 2019-10-31 | End: 2020-04-29 | Stop reason: SDUPTHER

## 2019-10-31 RX ORDER — LISINOPRIL 2.5 MG/1
2.5 TABLET ORAL DAILY
Qty: 90 TABLET | Refills: 1 | Status: SHIPPED | OUTPATIENT
Start: 2019-10-31 | End: 2020-04-29 | Stop reason: SDUPTHER

## 2019-10-31 RX ORDER — CEFPROZIL 250 MG/1
250 TABLET, FILM COATED ORAL EVERY 12 HOURS
Qty: 20 TABLET | Refills: 0 | Status: SHIPPED | OUTPATIENT
Start: 2019-10-31 | End: 2019-11-10

## 2019-10-31 RX ORDER — CARVEDILOL 3.12 MG/1
12.5 TABLET ORAL 2 TIMES DAILY WITH MEALS
Qty: 180 TABLET | Refills: 1 | Status: SHIPPED | OUTPATIENT
Start: 2019-10-31 | End: 2020-04-29 | Stop reason: SDUPTHER

## 2019-10-31 RX ORDER — ATORVASTATIN CALCIUM 10 MG/1
10 TABLET, FILM COATED ORAL DAILY
Qty: 90 TABLET | Refills: 1 | Status: SHIPPED | OUTPATIENT
Start: 2019-10-31 | End: 2020-04-29 | Stop reason: SDUPTHER

## 2019-10-31 RX ORDER — OXYBUTYNIN CHLORIDE 10 MG/1
20 TABLET, EXTENDED RELEASE ORAL DAILY
Qty: 180 TABLET | Refills: 1 | Status: SHIPPED | OUTPATIENT
Start: 2019-10-31 | End: 2020-04-29 | Stop reason: SDUPTHER

## 2019-10-31 NOTE — PROGRESS NOTES
SUBJECTIVE:    Patient ID: Martha Miguel is a 59 y.o. female.    Chief Complaint: Check Up / A1C=6.5 and c/o pain due to weather    Patient with hypertension hyperlipidemia presents for visit and  lab review. Prediabetes shows acceptable control with A1c of 6.5.  Lipids are normal.  CMP is normal.  All labs are within acceptable range with the exception of a UTI noted on urine culture.  Patient does report she has been experiencing 1 week of dysuria.  The patient has a thoracic aortic aneurysm that has been stable.  She follows with Dr. maxwell.  She will have a CT of her chest next week at [facility] for evaluation.    Patient has issues with venous insufficiency in her left lower extremity status post ankle fracture several years ago.  She does get swelling in this leg and pain in her foot.  She is generally very active walking but has not been doing so recently.  Her weight has increased as a result.  She reports that brain in the cold weather has been aggravating her joints.  She has arthritis in the knees and degenerative disc disease of lumbar spine.  Otherwise she has been compliant with her medications and reports no problems. She is up-to-date colonoscopy.  She does have a history of colon polyps.  Flu shot is given today.  She had a normal mammogram this summer.  She does complain of continued urinary urgency despite the use of discharge pain. At times she uses 2 tablets      Office Visit on 10/31/2019   Component Date Value Ref Range Status    Hemoglobin A1C 10/31/2019 6.5  % Final   Orders Only on 10/23/2019   Component Date Value Ref Range Status    Cholesterol 10/23/2019 132  <200 mg/dL Final    HDL 10/23/2019 48* >50 mg/dL Final    Triglycerides 10/23/2019 118  <150 mg/dL Final    LDL Cholesterol 10/23/2019 64  mg/dL (calc) Final    Hdl/Cholesterol Ratio 10/23/2019 2.8  <5.0 (calc) Final    Non HDL Chol. (LDL+VLDL) 10/23/2019 84  <130 mg/dL (calc) Final    Creatinine, Random Ur  10/23/2019 216  20 - 275 mg/dL Final    Microalb, Ur 10/23/2019 0.5  See Note: mg/dL Final    Microalb Creat Ratio 10/23/2019 2  <30 mcg/mg creat Final    Glucose 10/23/2019 105* 65 - 99 mg/dL Final    BUN, Bld 10/23/2019 17  7 - 25 mg/dL Final    Creatinine 10/23/2019 0.95  0.50 - 0.99 mg/dL Final    eGFR if non  10/23/2019 65  > OR = 60 mL/min/1.73m2 Final    eGFR if  10/23/2019 75  > OR = 60 mL/min/1.73m2 Final    BUN/Creatinine Ratio 10/23/2019 NOT APPLICABLE  6 - 22 (calc) Final    Sodium 10/23/2019 142  135 - 146 mmol/L Final    Potassium 10/23/2019 4.1  3.5 - 5.3 mmol/L Final    Chloride 10/23/2019 106  98 - 110 mmol/L Final    CO2 10/23/2019 26  20 - 32 mmol/L Final    Calcium 10/23/2019 9.5  8.6 - 10.4 mg/dL Final    Total Protein 10/23/2019 6.8  6.1 - 8.1 g/dL Final    Albumin 10/23/2019 4.1  3.6 - 5.1 g/dL Final    Globulin, Total 10/23/2019 2.7  1.9 - 3.7 g/dL (calc) Final    Albumin/Globulin Ratio 10/23/2019 1.5  1.0 - 2.5 (calc) Final    Total Bilirubin 10/23/2019 0.4  0.2 - 1.2 mg/dL Final    Alkaline Phosphatase 10/23/2019 64  33 - 130 U/L Final    AST 10/23/2019 13  10 - 35 U/L Final    ALT 10/23/2019 14  6 - 29 U/L Final    Color, UA 10/23/2019 YELLOW  YELLOW Final    Appearance, UA 10/23/2019 CLOUDY* CLEAR Final    Specific [place], UA 10/23/2019 1.026  1.001 - 1.035 Final    pH, UA 10/23/2019 < OR = 5.0  5.0 - 8.0 Final    Glucose, UA 10/23/2019 NEGATIVE  NEGATIVE Final    Bilirubin, UA 10/23/2019 NEGATIVE  NEGATIVE Final    Ketones, UA 10/23/2019 NEGATIVE  NEGATIVE Final    Occult Blood UA 10/23/2019 NEGATIVE  NEGATIVE Final    Protein, UA 10/23/2019 NEGATIVE  NEGATIVE Final    Nitrite, UA 10/23/2019 NEGATIVE  NEGATIVE Final    Leukocytes, UA 10/23/2019 2+* NEGATIVE Final    WBC Casts, UA 10/23/2019 > OR = 60* < OR = 5 /HPF Final    RBC Casts, UA 10/23/2019 0-2  < OR = 2 /HPF Final    Squam Epithel, UA 10/23/2019 0-5  < OR = 5  /HPF Final    Bacteria, UA 10/23/2019 FEW* NONE SEEN /HPF Final    Hyaline Casts, UA 10/23/2019 NONE SEEN  NONE SEEN /LPF Final    Reflexive Urine Culture 10/23/2019 CULTURE INDICATED - RESULTS TO FOLLOW   Final    TSH 10/23/2019 2.26  0.40 - 4.50 mIU/L Final    Extra Lavender Top Tube 10/23/2019    Final    Urine Culture, Routine 10/23/2019 *  Final     Past Medical History:   Diagnosis Date    Abnormal heart rhythm     Allergy     Arthritis     COPD (chronic obstructive pulmonary disease)     Coronary artery disease     Depression     Diabetes mellitus, type 2     Essential hypertension 8/16/2016    Right sided sciatica 4/23/2018     Past Surgical History:   Procedure Laterality Date    CHOLECYSTECTOMY      FRACTURE SURGERY      HYSTERECTOMY      INJECTION OF ANESTHETIC AGENT AROUND MEDIAL BRANCH NERVES INNERVATING LUMBAR FACET JOINT Bilateral 7/5/2018    Procedure: Block-nerve-medial branch-lumbar;  Surgeon: Arthur Orellana MD;  [facility];  Service: Pain Management;  Laterality: Bilateral;  L2, 3, 4, 5    TONSILLECTOMY       Family History   Family history unknown: Yes       Marital Status:   Alcohol History:  reports that she does not drink alcohol.  Tobacco History:  reports that she has never smoked. She has never used smokeless tobacco.  Drug History:  reports that she does not use drugs.    Review of patient's allergies indicates:   Allergen Reactions    Phenytoin Hives    Dilantin [phenytoin sodium extended] Rash    Lovenox [enoxaparin] Rash and Hives       Current Outpatient Medications:     aspirin (ECOTRIN) 81 MG EC tablet, Take 81 mg by mouth once daily., Disp: , Rfl:     atorvastatin (LIPITOR) 10 MG tablet, Take 1 tablet (10 mg total) by mouth once daily., Disp: 90 tablet, Rfl: 1    carvedilol (COREG) 3.125 MG tablet, Take 4 tablets (12.5 mg total) by mouth 2 (two) times daily with meals., Disp: 180 tablet, Rfl: 1    lisinopril (PRINIVIL,ZESTRIL) 2.5 MG tablet,  "Take 1 tablet (2.5 mg total) by mouth once daily., Disp: 90 tablet, Rfl: 1    metFORMIN (GLUCOPHAGE-XR) 750 MG 24 hr tablet, Take 1 tablet (750 mg total) by mouth daily with breakfast., Disp: 90 tablet, Rfl: 1    oxybutynin (DITROPAN-XL) 10 MG 24 hr tablet, Take 2 tablets (20 mg total) by mouth once daily., Disp: 180 tablet, Rfl: 1    cefPROZIL (CEFZIL) 250 MG tablet, Take 1 tablet (250 mg total) by mouth every 12 (twelve) hours. for 10 days, Disp: 20 tablet, Rfl: 0    Review of Systems   Constitutional: Negative for activity change, fatigue and unexpected weight change.   HENT: Negative for hearing loss, postnasal drip, sinus pressure, sore throat and voice change.    Eyes: Negative for photophobia and visual disturbance.   Respiratory: Negative for cough, shortness of breath and wheezing.    Cardiovascular: Negative for chest pain and palpitations.   Gastrointestinal: Negative for constipation, diarrhea and nausea.   Genitourinary: Positive for dysuria. Negative for difficulty urinating, frequency, hematuria and urgency.   Musculoskeletal: Positive for arthralgias. Negative for back pain.   Skin: Negative for rash.   Neurological: Negative for weakness, light-headedness and headaches.   Hematological: Negative for adenopathy. Does not bruise/bleed easily.   Psychiatric/Behavioral: The patient is not nervous/anxious.           Objective:      Vitals:    10/31/19 1645   BP: (!) 142/86   Pulse: 68   Weight: (!) 162.4 kg (358 lb)   Height: 5' 7" (1.702 m)     Physical Exam   Constitutional: She is oriented to person, place, and time. Vital signs are normal. She appears well-developed and well-nourished. No distress.   obese   HENT:   Head: Normocephalic and atraumatic.   Right Ear: Tympanic membrane and external ear normal.   Left Ear: Tympanic membrane and external ear normal.   Eyes: Pupils are equal, round, and reactive to light. Conjunctivae, EOM and lids are normal.   Neck: Full passive range of motion without " pain. Neck supple. No JVD present. No tracheal deviation present. No thyromegaly present.   Cardiovascular: Normal rate and regular rhythm. PMI is not displaced.   Pulmonary/Chest: Effort normal and breath sounds normal.   Abdominal: Soft. Bowel sounds are normal. There is no hepatosplenomegaly. There is no tenderness. There is no rebound and no guarding.   Musculoskeletal: She exhibits edema. She exhibits no tenderness.   Neurological: She is alert and oriented to person, place, and time.   Skin: Skin is warm and dry. No rash noted.   Psychiatric: She has a normal mood and affect.   Vitals reviewed.        Assessment:       1. Type 2 diabetes mellitus without complication, without long-term current use of insulin    2. Primary osteoarthritis involving multiple joints    3. Thoracic aortic aneurysm without rupture    4. Lumbar degenerative disc disease    5. Essential hypertension    6. Other spondylosis, lumbar region    7. Venous insufficiency of left leg    8. Needs flu shot    9. Acute cystitis without hematuria    10. Mixed stress and urge urinary incontinence         Plan:       Type 2 diabetes mellitus without complication, without long-term current use of insulin  -     Hemoglobin A1C, POCT  -     lisinopril (PRINIVIL,ZESTRIL) 2.5 MG tablet; Take 1 tablet (2.5 mg total) by mouth once daily.  Dispense: 90 tablet; Refill: 1  -     atorvastatin (LIPITOR) 10 MG tablet; Take 1 tablet (10 mg total) by mouth once daily.  Dispense: 90 tablet; Refill: 1  -     metFORMIN (GLUCOPHAGE-XR) 750 MG 24 hr tablet; Take 1 tablet (750 mg total) by mouth daily with breakfast.  Dispense: 90 tablet; Refill: 1    Primary osteoarthritis involving multiple joints    Thoracic aortic aneurysm without rupture    Lumbar degenerative disc disease    Essential hypertension  -     carvedilol (COREG) 3.125 MG tablet; Take 4 tablets (12.5 mg total) by mouth 2 (two) times daily with meals.  Dispense: 180 tablet; Refill: 1    Other  spondylosis, lumbar region    Venous insufficiency of left leg    Needs flu shot  -     Influenza - Quadrivalent (Recombinant) (PF)    Acute cystitis without hematuria  -     cefPROZIL (CEFZIL) 250 MG tablet; Take 1 tablet (250 mg total) by mouth every 12 (twelve) hours. for 10 days  Dispense: 20 tablet; Refill: 0    Mixed stress and urge urinary incontinence  -     oxybutynin (DITROPAN-XL) 10 MG 24 hr tablet; Take 2 tablets (20 mg total) by mouth once daily.  Dispense: 180 tablet; Refill: 1     Patient instructed to wear compression stockings for venous insufficiency.  All other medications will continue the same.  Follow up in about 6 months (around 4/30/2020).

## 2020-04-29 ENCOUNTER — OFFICE VISIT (OUTPATIENT)
Dept: FAMILY MEDICINE | Facility: CLINIC | Age: 60
End: 2020-04-29
Payer: MEDICARE

## 2020-04-29 DIAGNOSIS — I10 ESSENTIAL HYPERTENSION: Primary | ICD-10-CM

## 2020-04-29 DIAGNOSIS — G47.33 OSA (OBSTRUCTIVE SLEEP APNEA): ICD-10-CM

## 2020-04-29 DIAGNOSIS — E66.01 MORBID OBESITY: ICD-10-CM

## 2020-04-29 DIAGNOSIS — N39.46 MIXED STRESS AND URGE URINARY INCONTINENCE: ICD-10-CM

## 2020-04-29 DIAGNOSIS — E11.9 TYPE 2 DIABETES MELLITUS WITHOUT COMPLICATION, WITHOUT LONG-TERM CURRENT USE OF INSULIN: ICD-10-CM

## 2020-04-29 PROCEDURE — 99441 PR PHYSICIAN TELEPHONE EVALUATION 5-10 MIN: CPT | Mod: 95,,, | Performed by: FAMILY MEDICINE

## 2020-04-29 PROCEDURE — 3044F PR MOST RECENT HEMOGLOBIN A1C LEVEL <7.0%: ICD-10-PCS | Mod: 95,,, | Performed by: FAMILY MEDICINE

## 2020-04-29 PROCEDURE — 99441 PR PHYSICIAN TELEPHONE EVALUATION 5-10 MIN: ICD-10-PCS | Mod: 95,,, | Performed by: FAMILY MEDICINE

## 2020-04-29 PROCEDURE — 3044F HG A1C LEVEL LT 7.0%: CPT | Mod: 95,,, | Performed by: FAMILY MEDICINE

## 2020-04-29 RX ORDER — LISINOPRIL 2.5 MG/1
2.5 TABLET ORAL DAILY
Qty: 90 TABLET | Refills: 1 | Status: SHIPPED | OUTPATIENT
Start: 2020-04-29 | End: 2020-12-02 | Stop reason: SDUPTHER

## 2020-04-29 RX ORDER — ATORVASTATIN CALCIUM 10 MG/1
10 TABLET, FILM COATED ORAL DAILY
Qty: 90 TABLET | Refills: 1 | Status: SHIPPED | OUTPATIENT
Start: 2020-04-29 | End: 2020-12-02 | Stop reason: SDUPTHER

## 2020-04-29 RX ORDER — CARVEDILOL 3.12 MG/1
12.5 TABLET ORAL 2 TIMES DAILY WITH MEALS
Qty: 180 TABLET | Refills: 1 | Status: SHIPPED | OUTPATIENT
Start: 2020-04-29 | End: 2020-09-02

## 2020-04-29 RX ORDER — OXYBUTYNIN CHLORIDE 10 MG/1
20 TABLET, EXTENDED RELEASE ORAL DAILY
Qty: 180 TABLET | Refills: 1 | Status: SHIPPED | OUTPATIENT
Start: 2020-04-29 | End: 2020-12-02 | Stop reason: SDUPTHER

## 2020-04-29 RX ORDER — METFORMIN HYDROCHLORIDE 750 MG/1
750 TABLET, EXTENDED RELEASE ORAL
Qty: 90 TABLET | Refills: 1 | Status: SHIPPED | OUTPATIENT
Start: 2020-04-29 | End: 2020-06-24 | Stop reason: RX

## 2020-04-29 NOTE — PROGRESS NOTES
Established Patient - Audio Only Telehealth Visit     The patient location is: home  The chief complaint leading to consultation is: HTN, DM  Visit type: Virtual visit with audio only (telephone)     The reason for the audio only service rather than synchronous audio and video virtual visit was related to technical difficulties or patient preference/necessity.     Each patient to whom I provide medical services by telemedicine is:  (1) informed of the relationship between the physician and patient and the respective role of any other health care provider with respect to management of the patient; and (2) notified that they may decline to receive medical services by telemedicine and may withdraw from such care at any time. Patient verbally consented to receive this service via voice-only telephone call.       HPI:   Patient with hypertension and diabetes continues to work on weight.  She has had her blood pressure checked periodically and states that it is well controlled.  Her nonfasting blood sugars are in the 120s.  She admits that it was slightly elevated a few weeks ago but that was due to what she was eating.  She has sleep apnea and admits she does better when using her CPAP.  However she reports sometimes she falls asleep in her chair before putting her device on.  She is attempting to stay active by walking periodically.  However too much activity aggravates her lumbar degenerative disc disease.  Overall however she feels that she is doing well  She has checked and her colonoscopy isn't due until 2021.  She will be making an eye soon.  Dental visit is also pending    Office Visit on 10/31/2019   Component Date Value Ref Range Status    Hemoglobin A1C 10/31/2019 6.5  % Final     Assessment and plan:    Essential hypertension  -     carvediloL (COREG) 3.125 MG tablet; Take 4 tablets (12.5 mg total) by mouth 2 (two) times daily with meals.  Dispense: 180 tablet; Refill: 1  -     Comprehensive metabolic panel;  Future; Expected date: 04/29/2020    Type 2 diabetes mellitus without complication, without long-term current use of insulin  -     metFORMIN (GLUCOPHAGE-XR) 750 MG XR 24hr tablet; Take 1 tablet (750 mg total) by mouth daily with breakfast.  Dispense: 90 tablet; Refill: 1  -     lisinopriL (PRINIVIL,ZESTRIL) 2.5 MG tablet; Take 1 tablet (2.5 mg total) by mouth once daily.  Dispense: 90 tablet; Refill: 1  -     atorvastatin (LIPITOR) 10 MG tablet; Take 1 tablet (10 mg total) by mouth once daily.  Dispense: 90 tablet; Refill: 1  -     Hemoglobin A1c; Future; Expected date: 04/29/2020  -     Comprehensive metabolic panel; Future; Expected date: 04/29/2020  -     Lipid panel; Future; Expected date: 04/29/2020  -     Microalbumin/creatinine urine ratio; Future; Expected date: 04/29/2020    ZABRINA (obstructive sleep apnea)  Comments:  Encouraged to use CPAP nightly    Morbid obesity  Comments:  Continue to work on weight loss    Mixed stress and urge urinary incontinence  -     oxybutynin (DITROPAN-XL) 10 MG 24 hr tablet; Take 2 tablets (20 mg total) by mouth once daily.  Dispense: 180 tablet; Refill: 1                            This service was not originating from a related E/M service provided within the previous 7 days nor will  to an E/M service or procedure within the next 24 hours or my soonest available appointment.  Prevailing standard of care was able to be met in this audio-only visit.

## 2020-05-13 ENCOUNTER — TELEPHONE (OUTPATIENT)
Dept: FAMILY MEDICINE | Facility: CLINIC | Age: 60
End: 2020-05-13

## 2020-05-13 NOTE — TELEPHONE ENCOUNTER
----- Message from Jessy Newell MA sent at 5/13/2020  1:47 PM CDT -----  Late august/early sept  ----- Message -----  From: Katya Hummel MD  Sent: 4/29/2020   5:32 PM CDT  To: Jessy Newell MA    [facility] f/u

## 2020-06-24 ENCOUNTER — TELEPHONE (OUTPATIENT)
Dept: FAMILY MEDICINE | Facility: CLINIC | Age: 60
End: 2020-06-24

## 2020-06-24 DIAGNOSIS — E11.9 TYPE 2 DIABETES MELLITUS WITHOUT COMPLICATION, WITHOUT LONG-TERM CURRENT USE OF INSULIN: Primary | ICD-10-CM

## 2020-06-24 RX ORDER — METFORMIN HYDROCHLORIDE 850 MG/1
850 TABLET ORAL 2 TIMES DAILY WITH MEALS
Qty: 180 TABLET | Refills: 3 | Status: SHIPPED | OUTPATIENT
Start: 2020-06-24 | End: 2020-09-02

## 2020-06-24 NOTE — TELEPHONE ENCOUNTER
----- Message from Laura Viera sent at 6/24/2020  9:01 AM CDT -----  Regarding: Rx recalled  Contact: Martha Miguel  Pt would like to know what can she be put on in replacement of the Metformin because it has been recalled. Please give the patient a call back # [phone]

## 2020-07-02 ENCOUNTER — TELEPHONE (OUTPATIENT)
Dept: FAMILY MEDICINE | Facility: CLINIC | Age: 60
End: 2020-07-02

## 2020-07-02 NOTE — TELEPHONE ENCOUNTER
----- Message from Jacquelyn Hart MA sent at 7/2/2020  8:34 AM CDT -----  Pt called in to report she is having hip and leg pain in the left leg.  Reports is began sometime last week but has worsened.  Pt states this has happened in the past, and she was given an antibiotic for an infection.  Please contact pt to discuss and advise.    Pt - [phone]

## 2020-07-02 NOTE — TELEPHONE ENCOUNTER
Spoke with patient c/o L hip pain x1 week.  Patient states last time this happened she was informed she had an infection in her hip.  Feels as if her leg wants to give out.  Taking/alternating tylenol, aleve, and flexeril.  Please advise.  shyla BANERJEE

## 2020-07-02 NOTE — TELEPHONE ENCOUNTER
If patient suspects an infection in her hip joint I recommend she be evaluated in the emergency.room

## 2020-07-14 ENCOUNTER — TELEPHONE (OUTPATIENT)
Dept: FAMILY MEDICINE | Facility: CLINIC | Age: 60
End: 2020-07-14

## 2020-07-14 NOTE — TELEPHONE ENCOUNTER
----- Message from Emily Parkeh sent at 7/14/2020  2:36 PM CDT -----  Christopher Nguyen from [facility] and she needs a call back [phone] she needs someone to call her back as soon as possible if she doesn't answer please leave a detailed voicemail

## 2020-08-19 DIAGNOSIS — Z12.31 ENCOUNTER FOR SCREENING MAMMOGRAM FOR MALIGNANT NEOPLASM OF BREAST: Primary | ICD-10-CM

## 2020-08-21 ENCOUNTER — HOSPITAL ENCOUNTER (OUTPATIENT)
Dept: RADIOLOGY | Facility: HOSPITAL | Age: 60
Discharge: HOME | End: 2020-08-21
Attending: FAMILY MEDICINE
Payer: MEDICARE

## 2020-08-21 VITALS — WEIGHT: 293 LBS | HEIGHT: 67 IN | BODY MASS INDEX: 45.99 KG/M2

## 2020-08-21 DIAGNOSIS — Z12.31 ENCOUNTER FOR SCREENING MAMMOGRAM FOR MALIGNANT NEOPLASM OF BREAST: ICD-10-CM

## 2020-08-21 PROCEDURE — 77067 SCR MAMMO BI INCL CAD: CPT | Mod: TC,PO

## 2020-08-24 ENCOUNTER — TELEPHONE (OUTPATIENT)
Dept: FAMILY MEDICINE | Facility: CLINIC | Age: 60
End: 2020-08-24

## 2020-08-26 ENCOUNTER — TELEPHONE (OUTPATIENT)
Dept: FAMILY MEDICINE | Facility: CLINIC | Age: 60
End: 2020-08-26

## 2020-08-26 DIAGNOSIS — E11.9 TYPE 2 DIABETES MELLITUS WITHOUT COMPLICATION, WITHOUT LONG-TERM CURRENT USE OF INSULIN: ICD-10-CM

## 2020-08-26 DIAGNOSIS — I10 ESSENTIAL HYPERTENSION: Primary | ICD-10-CM

## 2020-08-26 NOTE — TELEPHONE ENCOUNTER
Reorder labs, already drawn.  Previous orders placed to complete at [facility]

## 2020-08-27 LAB
ALBUMIN SERPL-MCNC: 4 G/DL (ref 3.6–5.1)
ALBUMIN/CREAT UR: 2 MCG/MG CREAT
ALBUMIN/GLOB SERPL: 1.5 (CALC) (ref 1–2.5)
ALP SERPL-CCNC: 68 U/L (ref 37–153)
ALT SERPL-CCNC: 14 U/L (ref 6–29)
AST SERPL-CCNC: 11 U/L (ref 10–35)
BILIRUB SERPL-MCNC: 0.4 MG/DL (ref 0.2–1.2)
BUN SERPL-MCNC: 12 MG/DL (ref 7–25)
BUN/CREAT SERPL: ABNORMAL (CALC) (ref 6–22)
CALCIUM SERPL-MCNC: 8.9 MG/DL (ref 8.6–10.4)
CHLORIDE SERPL-SCNC: 105 MMOL/L (ref 98–110)
CHOLEST SERPL-MCNC: 159 MG/DL
CHOLEST/HDLC SERPL: 3.1 (CALC)
CO2 SERPL-SCNC: 27 MMOL/L (ref 20–32)
CREAT SERPL-MCNC: 0.83 MG/DL (ref 0.5–0.99)
CREAT UR-MCNC: 434 MG/DL (ref 20–275)
GFRSERPLBLD MDRD-ARVRAT: 77 ML/MIN/1.73M2
GLOBULIN SER CALC-MCNC: 2.6 G/DL (CALC) (ref 1.9–3.7)
GLUCOSE SERPL-MCNC: 124 MG/DL (ref 65–99)
HBA1C MFR BLD: 7.3 % OF TOTAL HGB
HDLC SERPL-MCNC: 51 MG/DL
LDLC SERPL CALC-MCNC: 85 MG/DL (CALC)
MICROALBUMIN UR-MCNC: 0.7 MG/DL
NONHDLC SERPL-MCNC: 108 MG/DL (CALC)
POTASSIUM SERPL-SCNC: 4 MMOL/L (ref 3.5–5.3)
PROT SERPL-MCNC: 6.6 G/DL (ref 6.1–8.1)
SODIUM SERPL-SCNC: 141 MMOL/L (ref 135–146)
TRIGL SERPL-MCNC: 133 MG/DL

## 2020-09-02 ENCOUNTER — OFFICE VISIT (OUTPATIENT)
Dept: FAMILY MEDICINE | Facility: CLINIC | Age: 60
End: 2020-09-02
Payer: MEDICARE

## 2020-09-02 VITALS
HEIGHT: 67 IN | HEART RATE: 80 BPM | DIASTOLIC BLOOD PRESSURE: 76 MMHG | SYSTOLIC BLOOD PRESSURE: 124 MMHG | TEMPERATURE: 98 F | BODY MASS INDEX: 45.99 KG/M2 | WEIGHT: 293 LBS

## 2020-09-02 DIAGNOSIS — Z23 NEEDS FLU SHOT: ICD-10-CM

## 2020-09-02 DIAGNOSIS — E66.01 MORBID OBESITY: ICD-10-CM

## 2020-09-02 DIAGNOSIS — M15.9 PRIMARY OSTEOARTHRITIS INVOLVING MULTIPLE JOINTS: ICD-10-CM

## 2020-09-02 DIAGNOSIS — M51.36 LUMBAR DEGENERATIVE DISC DISEASE: ICD-10-CM

## 2020-09-02 DIAGNOSIS — I71.20 THORACIC AORTIC ANEURYSM WITHOUT RUPTURE: ICD-10-CM

## 2020-09-02 DIAGNOSIS — N39.46 MIXED STRESS AND URGE URINARY INCONTINENCE: ICD-10-CM

## 2020-09-02 DIAGNOSIS — I10 ESSENTIAL HYPERTENSION: ICD-10-CM

## 2020-09-02 DIAGNOSIS — E11.9 TYPE 2 DIABETES MELLITUS WITHOUT COMPLICATION, WITHOUT LONG-TERM CURRENT USE OF INSULIN: Primary | ICD-10-CM

## 2020-09-02 PROCEDURE — G0008 FLU VACCINE - QUADRIVALENT (RECOMBINANT) PRESERVATIVE FREE: ICD-10-PCS | Mod: S$GLB,,, | Performed by: FAMILY MEDICINE

## 2020-09-02 PROCEDURE — 3008F PR BODY MASS INDEX (BMI) DOCUMENTED: ICD-10-PCS | Mod: S$GLB,,, | Performed by: FAMILY MEDICINE

## 2020-09-02 PROCEDURE — 3051F PR MOST RECENT HEMOGLOBIN A1C LEVEL 7.0 - < 8.0%: ICD-10-PCS | Mod: S$GLB,,, | Performed by: FAMILY MEDICINE

## 2020-09-02 PROCEDURE — 3078F PR MOST RECENT DIASTOLIC BLOOD PRESSURE < 80 MM HG: ICD-10-PCS | Mod: S$GLB,,, | Performed by: FAMILY MEDICINE

## 2020-09-02 PROCEDURE — 99214 OFFICE O/P EST MOD 30 MIN: CPT | Mod: 25,S$GLB,, | Performed by: FAMILY MEDICINE

## 2020-09-02 PROCEDURE — 3074F SYST BP LT 130 MM HG: CPT | Mod: S$GLB,,, | Performed by: FAMILY MEDICINE

## 2020-09-02 PROCEDURE — 90682 FLU VACCINE - QUADRIVALENT (RECOMBINANT) PRESERVATIVE FREE: ICD-10-PCS | Mod: S$GLB,,, | Performed by: FAMILY MEDICINE

## 2020-09-02 PROCEDURE — 90682 RIV4 VACC RECOMBINANT DNA IM: CPT | Mod: S$GLB,,, | Performed by: FAMILY MEDICINE

## 2020-09-02 PROCEDURE — G0008 ADMIN INFLUENZA VIRUS VAC: HCPCS | Mod: S$GLB,,, | Performed by: FAMILY MEDICINE

## 2020-09-02 PROCEDURE — 3078F DIAST BP <80 MM HG: CPT | Mod: S$GLB,,, | Performed by: FAMILY MEDICINE

## 2020-09-02 PROCEDURE — 3008F BODY MASS INDEX DOCD: CPT | Mod: S$GLB,,, | Performed by: FAMILY MEDICINE

## 2020-09-02 PROCEDURE — 99214 PR OFFICE/OUTPT VISIT, EST, LEVL IV, 30-39 MIN: ICD-10-PCS | Mod: 25,S$GLB,, | Performed by: FAMILY MEDICINE

## 2020-09-02 PROCEDURE — 3074F PR MOST RECENT SYSTOLIC BLOOD PRESSURE < 130 MM HG: ICD-10-PCS | Mod: S$GLB,,, | Performed by: FAMILY MEDICINE

## 2020-09-02 PROCEDURE — 3051F HG A1C>EQUAL 7.0%<8.0%: CPT | Mod: S$GLB,,, | Performed by: FAMILY MEDICINE

## 2020-09-02 RX ORDER — CYCLOBENZAPRINE HCL 10 MG
10 TABLET ORAL NIGHTLY PRN
COMMUNITY
Start: 2016-08-16 | End: 2020-09-02 | Stop reason: SDUPTHER

## 2020-09-02 RX ORDER — METFORMIN HYDROCHLORIDE 750 MG/1
750 TABLET, EXTENDED RELEASE ORAL
COMMUNITY
Start: 2020-08-23 | End: 2021-06-14 | Stop reason: SDUPTHER

## 2020-09-02 RX ORDER — CARVEDILOL 3.12 MG/1
3.12 TABLET ORAL 2 TIMES DAILY WITH MEALS
Qty: 180 TABLET | Refills: 1 | Status: SHIPPED | OUTPATIENT
Start: 2020-09-02 | End: 2020-12-02 | Stop reason: SDUPTHER

## 2020-09-02 RX ORDER — HYDROCODONE BITARTRATE AND ACETAMINOPHEN 5; 325 MG/1; MG/1
1 TABLET ORAL
COMMUNITY
Start: 2020-07-02 | End: 2022-02-01

## 2020-09-02 RX ORDER — CYCLOBENZAPRINE HCL 10 MG
10 TABLET ORAL NIGHTLY PRN
Qty: 90 TABLET | Refills: 1 | Status: SHIPPED | OUTPATIENT
Start: 2020-09-02 | End: 2022-08-11

## 2020-09-02 NOTE — PROGRESS NOTES
SUBJECTIVE:    Patient ID: Martha Miguel is a 60 y.o. female.    Chief Complaint: DM Check Up and discuss carvedilol    Patient with past medical history of hypertension, diabetes, degenerative disc disease of the lumbar spine and benign aortic aneurysm presents for visit.  There was some confusion with her carvedilol dosing.  She however has still only been taking 1 tablet twice a day.  Blood pressure looks good.  Renal function, liver function and lipid panel all acceptable on labs.  Her A1c has slightly increased secondary to poor diet and activity due to the coronal crisis.  There was also some change in her med secondary to a metformin recall.  She has had mammogram which was normal.  Flu shot is due today.         Office Visit on 04/29/2020   Component Date Value Ref Range Status    Creatinine, Random Ur 08/26/2020 434* 20 - 275 mg/dL Final    Microalb, Ur 08/26/2020 0.7  See Note: mg/dL Final    Microalb Creat Ratio 08/26/2020 2  <30 mcg/mg creat Final    Cholesterol 08/26/2020 159  <200 mg/dL Final    HDL 08/26/2020 51  > OR = 50 mg/dL Final    Triglycerides 08/26/2020 133  <150 mg/dL Final    LDL Cholesterol 08/26/2020 85  mg/dL (calc) Final    Hdl/Cholesterol Ratio 08/26/2020 3.1  <5.0 (calc) Final    Non HDL Chol. (LDL+VLDL) 08/26/2020 108  <130 mg/dL (calc) Final    Glucose 08/26/2020 124* 65 - 99 mg/dL Final    BUN, Bld 08/26/2020 12  7 - 25 mg/dL Final    Creatinine 08/26/2020 0.83  0.50 - 0.99 mg/dL Final    eGFR if non African American 08/26/2020 77  > OR = 60 mL/min/1.73m2 Final    eGFR if African American 08/26/2020 89  > OR = 60 mL/min/1.73m2 Final    BUN/Creatinine Ratio 08/26/2020 NOT APPLICABLE  6 - 22 (calc) Final    Sodium 08/26/2020 141  135 - 146 mmol/L Final    Potassium 08/26/2020 4.0  3.5 - 5.3 mmol/L Final    Chloride 08/26/2020 105  98 - 110 mmol/L Final    CO2 08/26/2020 27  20 - 32 mmol/L Final    Calcium 08/26/2020 8.9  8.6 - 10.4 mg/dL Final    Total Protein  08/26/2020 6.6  6.1 - 8.1 g/dL Final    Albumin 08/26/2020 4.0  3.6 - 5.1 g/dL Final    Globulin, Total 08/26/2020 2.6  1.9 - 3.7 g/dL (calc) Final    Albumin/Globulin Ratio 08/26/2020 1.5  1.0 - 2.5 (calc) Final    Total Bilirubin 08/26/2020 0.4  0.2 - 1.2 mg/dL Final    Alkaline Phosphatase 08/26/2020 68  37 - 153 U/L Final    AST 08/26/2020 11  10 - 35 U/L Final    ALT 08/26/2020 14  6 - 29 U/L Final    Hemoglobin A1C 08/26/2020 7.3* <5.7 % of total Hgb Final       Past Medical History:   Diagnosis Date    Abnormal heart rhythm     Allergy     Arthritis     COPD (chronic obstructive pulmonary disease)     Coronary artery disease     Depression     Diabetes mellitus, type 2     Essential hypertension 8/16/2016    Right sided sciatica 4/23/2018     Past Surgical History:   Procedure Laterality Date    CHOLECYSTECTOMY      FRACTURE SURGERY      HYSTERECTOMY      INJECTION OF ANESTHETIC AGENT AROUND MEDIAL BRANCH NERVES INNERVATING LUMBAR FACET JOINT Bilateral 7/5/2018    Procedure: Block-nerve-medial branch-lumbar;  Surgeon: Arthur Orellana MD;  Location: [facility];  Service: Pain Management;  Laterality: Bilateral;  L2, 3, 4, 5    TONSILLECTOMY       Family History   Family history unknown: Yes       Marital Status:   Alcohol History:  reports no history of alcohol use.  Tobacco History:  reports that she has never smoked. She has never used smokeless tobacco.  Drug History:  reports no history of drug use.    Review of patient's allergies indicates:   Allergen Reactions    Phenytoin Hives    Dilantin [phenytoin sodium extended] Rash    Lovenox [enoxaparin] Rash and Hives       Current Outpatient Medications:     aspirin (ECOTRIN) 81 MG EC tablet, Take 81 mg by mouth once daily., Disp: , Rfl:     atorvastatin (LIPITOR) 10 MG tablet, Take 1 tablet (10 mg total) by mouth once daily., Disp: 90 tablet, Rfl: 1    carvediloL (COREG) 3.125 MG tablet, Take 1 tablet (3.125 mg total) by mouth 2  "(two) times daily with meals., Disp: 180 tablet, Rfl: 1    cyclobenzaprine (FLEXERIL) 10 MG tablet, Take 1 tablet (10 mg total) by mouth nightly as needed., Disp: 90 tablet, Rfl: 1    HYDROcodone-acetaminophen (NORCO) 5-325 mg per tablet, Take 1 tablet by mouth every 6 to 8 hours as needed., Disp: , Rfl:     lisinopriL (PRINIVIL,ZESTRIL) 2.5 MG tablet, Take 1 tablet (2.5 mg total) by mouth once daily., Disp: 90 tablet, Rfl: 1    oxybutynin (DITROPAN-XL) 10 MG 24 hr tablet, Take 2 tablets (20 mg total) by mouth once daily., Disp: 180 tablet, Rfl: 1    metFORMIN (GLUCOPHAGE-XR) 750 MG ER 24hr tablet, Take 750 mg by mouth once daily., Disp: , Rfl:     Review of Systems   Constitutional: Negative for activity change, fatigue and unexpected weight change.   HENT: Negative for hearing loss, postnasal drip, sinus pressure, sore throat and voice change.    Eyes: Negative for photophobia and visual disturbance.   Respiratory: Negative for cough, shortness of breath and wheezing.    Cardiovascular: Positive for leg swelling. Negative for chest pain and palpitations.   Gastrointestinal: Negative for constipation, diarrhea and nausea.   Genitourinary: Negative for difficulty urinating, frequency, hematuria and urgency.   Musculoskeletal: Positive for arthralgias and back pain.   Skin: Negative for rash.   Neurological: Negative for weakness, light-headedness and headaches.   Hematological: Negative for adenopathy. Does not bruise/bleed easily.   Psychiatric/Behavioral: The patient is not nervous/anxious.           Objective:      Vitals:    09/02/20 1400   BP: 124/76   Pulse: 80   Temp: 97.9 °F (36.6 °C)   Weight: (!) 166.5 kg (367 lb)   Height: 5' 7" (1.702 m)     Physical Exam  Vitals signs reviewed.   Constitutional:       General: She is not in acute distress.     Appearance: Normal appearance. She is well-developed. She is obese.   HENT:      Head: Normocephalic and atraumatic.      Right Ear: External ear normal.     "  Left Ear: External ear normal.      Nose: Nose normal.      Mouth/Throat:      Mouth: Mucous membranes are moist.   Eyes:      General: Lids are normal.      Conjunctiva/sclera: Conjunctivae normal.      Pupils: Pupils are equal, round, and reactive to light.   Neck:      Musculoskeletal: Full passive range of motion without pain and neck supple.      Thyroid: No thyromegaly.      Vascular: No JVD.      Trachea: No tracheal deviation.   Cardiovascular:      Rate and Rhythm: Normal rate and regular rhythm.      Chest Wall: PMI is not displaced.      Pulses: Normal pulses.      Heart sounds: Normal heart sounds.   Pulmonary:      Effort: Pulmonary effort is normal.      Breath sounds: Normal breath sounds.   Abdominal:      General: Bowel sounds are normal.      Palpations: Abdomen is soft.      Tenderness: There is no abdominal tenderness. There is no guarding or rebound.   Musculoskeletal:         General: Swelling present. No tenderness.   Skin:     General: Skin is warm and dry.      Findings: No rash.   Neurological:      General: No focal deficit present.      Mental Status: She is alert and oriented to person, place, and time.   Psychiatric:         Mood and Affect: Mood normal.         Behavior: Behavior normal.           Assessment:       1. Type 2 diabetes mellitus without complication, without long-term current use of insulin    2. Essential hypertension    3. Morbid obesity    4. Mixed stress and urge urinary incontinence    5. Primary osteoarthritis involving multiple joints    6. Thoracic aortic aneurysm without rupture    7. Needs flu shot    8. Lumbar degenerative disc disease         Plan:       Type 2 diabetes mellitus without complication, without long-term current use of insulin    Essential hypertension  Comments:  Controlled  Orders:  -     carvediloL (COREG) 3.125 MG tablet; Take 1 tablet (3.125 mg total) by mouth 2 (two) times daily with meals.  Dispense: 180 tablet; Refill: 1    Morbid  obesity    Mixed stress and urge urinary incontinence  Comments:  stable    Primary osteoarthritis involving multiple joints    Thoracic aortic aneurysm without rupture  Comments:  stable  Orders:  -     carvediloL (COREG) 3.125 MG tablet; Take 1 tablet (3.125 mg total) by mouth 2 (two) times daily with meals.  Dispense: 180 tablet; Refill: 1    Needs flu shot  -     Influenza - Quadrivalent (Recombinant) (PF)    Lumbar degenerative disc disease  Comments:  Uses anti-inflammatories in limits activity to controlled pain  Orders:  -     cyclobenzaprine (FLEXERIL) 10 MG tablet; Take 1 tablet (10 mg total) by mouth nightly as needed.  Dispense: 90 tablet; Refill: 1      Follow up in about 3 months (around 12/2/2020) for Diabetic Check-Up.

## 2020-09-09 ENCOUNTER — TELEPHONE (OUTPATIENT)
Dept: FAMILY MEDICINE | Facility: CLINIC | Age: 60
End: 2020-09-09

## 2020-09-09 NOTE — TELEPHONE ENCOUNTER
Spoke with patient notified of message per dr crawford. Patient verbalized understanding, informed to return call with questions/concerns -DN

## 2020-09-09 NOTE — TELEPHONE ENCOUNTER
----- Message from Sirena Everett sent at 9/8/2020  9:24 AM CDT -----     9:17   The patient needs a call back on some test she has to have. Pts # [phone]

## 2020-09-09 NOTE — TELEPHONE ENCOUNTER
Pt says her sridharon tested positive for hepatitis and she needs to know if she should be tested.

## 2020-11-24 ENCOUNTER — TELEPHONE (OUTPATIENT)
Dept: FAMILY MEDICINE | Facility: CLINIC | Age: 60
End: 2020-11-24

## 2020-11-27 LAB
ALBUMIN SERPL-MCNC: 4.3 G/DL (ref 3.6–5.1)
ALBUMIN/CREAT UR: 3 MCG/MG CREAT
ALBUMIN/GLOB SERPL: 1.5 (CALC) (ref 1–2.5)
ALP SERPL-CCNC: 76 U/L (ref 37–153)
ALT SERPL-CCNC: 14 U/L (ref 6–29)
AST SERPL-CCNC: 13 U/L (ref 10–35)
BILIRUB SERPL-MCNC: 0.5 MG/DL (ref 0.2–1.2)
BUN SERPL-MCNC: 13 MG/DL (ref 7–25)
BUN/CREAT SERPL: ABNORMAL (CALC) (ref 6–22)
CALCIUM SERPL-MCNC: 9.7 MG/DL (ref 8.6–10.4)
CHLORIDE SERPL-SCNC: 104 MMOL/L (ref 98–110)
CHOLEST SERPL-MCNC: 165 MG/DL
CHOLEST/HDLC SERPL: 3.2 (CALC)
CO2 SERPL-SCNC: 28 MMOL/L (ref 20–32)
CREAT SERPL-MCNC: 0.84 MG/DL (ref 0.5–0.99)
CREAT UR-MCNC: 116 MG/DL (ref 20–275)
GFRSERPLBLD MDRD-ARVRAT: 75 ML/MIN/1.73M2
GLOBULIN SER CALC-MCNC: 2.8 G/DL (CALC) (ref 1.9–3.7)
GLUCOSE SERPL-MCNC: 137 MG/DL (ref 65–99)
HBA1C MFR BLD: 7.8 % OF TOTAL HGB
HDLC SERPL-MCNC: 52 MG/DL
LDLC SERPL CALC-MCNC: 90 MG/DL (CALC)
MICROALBUMIN UR-MCNC: 0.3 MG/DL
NONHDLC SERPL-MCNC: 113 MG/DL (CALC)
POTASSIUM SERPL-SCNC: 4.5 MMOL/L (ref 3.5–5.3)
PROT SERPL-MCNC: 7.1 G/DL (ref 6.1–8.1)
SODIUM SERPL-SCNC: 140 MMOL/L (ref 135–146)
TRIGL SERPL-MCNC: 131 MG/DL

## 2020-12-02 ENCOUNTER — OFFICE VISIT (OUTPATIENT)
Dept: FAMILY MEDICINE | Facility: CLINIC | Age: 60
End: 2020-12-02
Payer: MEDICARE

## 2020-12-02 VITALS
DIASTOLIC BLOOD PRESSURE: 84 MMHG | BODY MASS INDEX: 45.99 KG/M2 | WEIGHT: 293 LBS | HEART RATE: 80 BPM | SYSTOLIC BLOOD PRESSURE: 110 MMHG | HEIGHT: 67 IN

## 2020-12-02 DIAGNOSIS — I71.20 THORACIC AORTIC ANEURYSM WITHOUT RUPTURE: ICD-10-CM

## 2020-12-02 DIAGNOSIS — I10 ESSENTIAL HYPERTENSION: ICD-10-CM

## 2020-12-02 DIAGNOSIS — M15.9 PRIMARY OSTEOARTHRITIS INVOLVING MULTIPLE JOINTS: ICD-10-CM

## 2020-12-02 DIAGNOSIS — E11.9 TYPE 2 DIABETES MELLITUS WITHOUT COMPLICATION, WITHOUT LONG-TERM CURRENT USE OF INSULIN: Primary | ICD-10-CM

## 2020-12-02 DIAGNOSIS — G47.33 OSA (OBSTRUCTIVE SLEEP APNEA): ICD-10-CM

## 2020-12-02 DIAGNOSIS — L30.4 INTERTRIGO: ICD-10-CM

## 2020-12-02 DIAGNOSIS — N39.46 MIXED STRESS AND URGE URINARY INCONTINENCE: ICD-10-CM

## 2020-12-02 PROCEDURE — 3074F PR MOST RECENT SYSTOLIC BLOOD PRESSURE < 130 MM HG: ICD-10-PCS | Mod: S$GLB,,, | Performed by: FAMILY MEDICINE

## 2020-12-02 PROCEDURE — 3008F PR BODY MASS INDEX (BMI) DOCUMENTED: ICD-10-PCS | Mod: S$GLB,,, | Performed by: FAMILY MEDICINE

## 2020-12-02 PROCEDURE — 3008F BODY MASS INDEX DOCD: CPT | Mod: S$GLB,,, | Performed by: FAMILY MEDICINE

## 2020-12-02 PROCEDURE — 3051F PR MOST RECENT HEMOGLOBIN A1C LEVEL 7.0 - < 8.0%: ICD-10-PCS | Mod: S$GLB,,, | Performed by: FAMILY MEDICINE

## 2020-12-02 PROCEDURE — 3074F SYST BP LT 130 MM HG: CPT | Mod: S$GLB,,, | Performed by: FAMILY MEDICINE

## 2020-12-02 PROCEDURE — 3079F DIAST BP 80-89 MM HG: CPT | Mod: S$GLB,,, | Performed by: FAMILY MEDICINE

## 2020-12-02 PROCEDURE — 3051F HG A1C>EQUAL 7.0%<8.0%: CPT | Mod: S$GLB,,, | Performed by: FAMILY MEDICINE

## 2020-12-02 PROCEDURE — 99214 OFFICE O/P EST MOD 30 MIN: CPT | Mod: S$GLB,,, | Performed by: FAMILY MEDICINE

## 2020-12-02 PROCEDURE — 3079F PR MOST RECENT DIASTOLIC BLOOD PRESSURE 80-89 MM HG: ICD-10-PCS | Mod: S$GLB,,, | Performed by: FAMILY MEDICINE

## 2020-12-02 PROCEDURE — 99214 PR OFFICE/OUTPT VISIT, EST, LEVL IV, 30-39 MIN: ICD-10-PCS | Mod: S$GLB,,, | Performed by: FAMILY MEDICINE

## 2020-12-02 RX ORDER — OXYBUTYNIN CHLORIDE 10 MG/1
20 TABLET, EXTENDED RELEASE ORAL DAILY
Qty: 180 TABLET | Refills: 1 | Status: SHIPPED | OUTPATIENT
Start: 2020-12-02 | End: 2021-08-03 | Stop reason: SDUPTHER

## 2020-12-02 RX ORDER — LISINOPRIL 2.5 MG/1
2.5 TABLET ORAL DAILY
Qty: 90 TABLET | Refills: 1 | Status: SHIPPED | OUTPATIENT
Start: 2020-12-02 | End: 2021-04-01 | Stop reason: SDUPTHER

## 2020-12-02 RX ORDER — ATORVASTATIN CALCIUM 10 MG/1
10 TABLET, FILM COATED ORAL DAILY
Qty: 90 TABLET | Refills: 1 | Status: SHIPPED | OUTPATIENT
Start: 2020-12-02 | End: 2021-04-01 | Stop reason: SDUPTHER

## 2020-12-02 RX ORDER — CARVEDILOL 3.12 MG/1
3.12 TABLET ORAL 2 TIMES DAILY WITH MEALS
Qty: 180 TABLET | Refills: 1 | Status: SHIPPED | OUTPATIENT
Start: 2020-12-02 | End: 2021-08-03 | Stop reason: SDUPTHER

## 2020-12-02 NOTE — PROGRESS NOTES
SUBJECTIVE:    Patient ID: Martha Miguel is a 60 y.o. female.    Chief Complaint: 2M DM Check Up / Lab Review and c/o L ankle pain    Patient with hypertension and diabetes presents for visit.  A1c has been slowly climbing.  Patient admits to more eating and less activity recently.  Blood pressure well controlled.  Labs all within normal limits with the exception of elevated A1c.  Has history of lumbar disease and osteoarthritis.  No she has more issues in the cold weather and therefore not been as active.  UTD with mammogram. colonscopy due   Notes  Has some issues with skin irration to groin and perirectal area        Telephone on 08/26/2020   Component Date Value Ref Range Status    Creatinine, Urine 11/25/2020 116  20 - 275 mg/dL Final    Microalb, Ur 11/25/2020 0.3  See Note: mg/dL Final    Microalb/Creat Ratio 11/25/2020 3  <30 mcg/mg creat Final    Cholesterol 11/25/2020 165  <200 mg/dL Final    HDL 11/25/2020 52  > OR = 50 mg/dL Final    Triglycerides 11/25/2020 131  <150 mg/dL Final    LDL Cholesterol 11/25/2020 90  mg/dL (calc) Final    HDL/Cholesterol Ratio 11/25/2020 3.2  <5.0 (calc) Final    Non HDL Chol. (LDL+VLDL) 11/25/2020 113  <130 mg/dL (calc) Final    Glucose 11/25/2020 137* 65 - 99 mg/dL Final    BUN 11/25/2020 13  7 - 25 mg/dL Final    Creatinine 11/25/2020 0.84  0.50 - 0.99 mg/dL Final    eGFR if non African American 11/25/2020 75  > OR = 60 mL/min/1.73m2 Final    eGFR if African American 11/25/2020 87  > OR = 60 mL/min/1.73m2 Final    BUN/Creatinine Ratio 11/25/2020 NOT APPLICABLE  6 - 22 (calc) Final    Sodium 11/25/2020 140  135 - 146 mmol/L Final    Potassium 11/25/2020 4.5  3.5 - 5.3 mmol/L Final    Chloride 11/25/2020 104  98 - 110 mmol/L Final    CO2 11/25/2020 28  20 - 32 mmol/L Final    Calcium 11/25/2020 9.7  8.6 - 10.4 mg/dL Final    Total Protein 11/25/2020 7.1  6.1 - 8.1 g/dL Final    Albumin 11/25/2020 4.3  3.6 - 5.1 g/dL Final    Globulin, Total  11/25/2020 2.8  1.9 - 3.7 g/dL (calc) Final    Albumin/Globulin Ratio 11/25/2020 1.5  1.0 - 2.5 (calc) Final    Total Bilirubin 11/25/2020 0.5  0.2 - 1.2 mg/dL Final    Alkaline Phosphatase 11/25/2020 76  37 - 153 U/L Final    AST 11/25/2020 13  10 - 35 U/L Final    ALT 11/25/2020 14  6 - 29 U/L Final    Hemoglobin A1C 11/25/2020 7.8* <5.7 % of total Hgb Final       Past Medical History:   Diagnosis Date    Abnormal heart rhythm     Allergy     Arthritis     COPD (chronic obstructive pulmonary disease)     Coronary artery disease     Depression     Diabetes mellitus, type 2     Essential hypertension 8/16/2016    Right sided sciatica 4/23/2018     Past Surgical History:   Procedure Laterality Date    CHOLECYSTECTOMY      FRACTURE SURGERY      HYSTERECTOMY      INJECTION OF ANESTHETIC AGENT AROUND MEDIAL BRANCH NERVES INNERVATING LUMBAR FACET JOINT Bilateral 7/5/2018    Procedure: Block-nerve-medial branch-lumbar;  Surgeon: Arthur Orellana MD;  Location: [facility];  Service: Pain Management;  Laterality: Bilateral;  L2, 3, 4, 5    TONSILLECTOMY       Family History   Family history unknown: Yes       Marital Status:   Alcohol History:  reports no history of alcohol use.  Tobacco History:  reports that she has never smoked. She has never used smokeless tobacco.  Drug History:  reports no history of drug use.    Review of patient's allergies indicates:   Allergen Reactions    Phenytoin Hives    Dilantin [phenytoin sodium extended] Rash    Lovenox [enoxaparin] Rash and Hives       Current Outpatient Medications:     aspirin (ECOTRIN) 81 MG EC tablet, Take 81 mg by mouth once daily., Disp: , Rfl:     atorvastatin (LIPITOR) 10 MG tablet, Take 1 tablet (10 mg total) by mouth once daily., Disp: 90 tablet, Rfl: 1    carvediloL (COREG) 3.125 MG tablet, Take 1 tablet (3.125 mg total) by mouth 2 (two) times daily with meals., Disp: 180 tablet, Rfl: 1    cyclobenzaprine (FLEXERIL) 10 MG tablet, Take  "1 tablet (10 mg total) by mouth nightly as needed., Disp: 90 tablet, Rfl: 1    HYDROcodone-acetaminophen (NORCO) 5-325 mg per tablet, Take 1 tablet by mouth every 6 to 8 hours as needed., Disp: , Rfl:     lisinopriL (PRINIVIL,ZESTRIL) 2.5 MG tablet, Take 1 tablet (2.5 mg total) by mouth once daily., Disp: 90 tablet, Rfl: 1    metFORMIN (GLUCOPHAGE-XR) 750 MG ER 24hr tablet, Take 750 mg by mouth 2 (two) times daily before meals., Disp: , Rfl:     oxybutynin (DITROPAN-XL) 10 MG 24 hr tablet, Take 2 tablets (20 mg total) by mouth once daily., Disp: 180 tablet, Rfl: 1    Review of Systems   Constitutional: Negative for activity change, fatigue and unexpected weight change.   HENT: Negative for hearing loss, postnasal drip, sinus pressure, sore throat and voice change.    Eyes: Negative for photophobia and visual disturbance.   Respiratory: Negative for cough, shortness of breath and wheezing.    Cardiovascular: Negative for chest pain and palpitations.   Gastrointestinal: Negative for constipation, diarrhea and nausea.   Genitourinary: Negative for difficulty urinating, frequency, hematuria and urgency.   Musculoskeletal: Positive for arthralgias. Negative for back pain.   Skin: Negative for rash.   Neurological: Negative for weakness, light-headedness and headaches.   Hematological: Negative for adenopathy. Does not bruise/bleed easily.   Psychiatric/Behavioral: The patient is not nervous/anxious.           Objective:      Vitals:    12/02/20 1437   BP: 110/84   Pulse: 80   Weight: (!) 165.1 kg (364 lb)   Height: 5' 7" (1.702 m)     Physical Exam  Vitals signs reviewed.   Constitutional:       General: She is not in acute distress.     Appearance: Normal appearance. She is well-developed. She is morbidly obese.   HENT:      Head: Normocephalic and atraumatic.      Right Ear: External ear normal.      Left Ear: External ear normal.      Nose: Nose normal.      Mouth/Throat:      Mouth: Mucous membranes are moist. "   Eyes:      General: Lids are normal.      Conjunctiva/sclera: Conjunctivae normal.      Pupils: Pupils are equal, round, and reactive to light.   Neck:      Musculoskeletal: Full passive range of motion without pain and neck supple.      Thyroid: No thyromegaly.      Vascular: No JVD.      Trachea: No tracheal deviation.   Cardiovascular:      Rate and Rhythm: Normal rate and regular rhythm.      Chest Wall: PMI is not displaced.      Pulses: Normal pulses.      Heart sounds: Normal heart sounds.   Pulmonary:      Effort: Pulmonary effort is normal.      Breath sounds: Normal breath sounds.   Abdominal:      General: Bowel sounds are normal.      Palpations: Abdomen is soft.      Tenderness: There is no abdominal tenderness. There is no guarding or rebound.   Musculoskeletal: Normal range of motion.         General: No tenderness.   Skin:     General: Skin is warm and dry.      Findings: No rash.   Neurological:      General: No focal deficit present.      Mental Status: She is alert and oriented to person, place, and time.   Psychiatric:         Mood and Affect: Mood normal.         Behavior: Behavior normal.           Assessment:       1. Type 2 diabetes mellitus without complication, without long-term current use of insulin    2. Thoracic aortic aneurysm without rupture    3. Primary osteoarthritis involving multiple joints    4. ZABRINA (obstructive sleep apnea)    5. Essential hypertension    6. Mixed stress and urge urinary incontinence    7. Essential hypertension    8. Thoracic aortic aneurysm without rupture    9. Intertrigo         Plan:       Type 2 diabetes mellitus without complication, without long-term current use of insulin  -     atorvastatin (LIPITOR) 10 MG tablet; Take 1 tablet (10 mg total) by mouth once daily.  Dispense: 90 tablet; Refill: 1  -     lisinopriL (PRINIVIL,ZESTRIL) 2.5 MG tablet; Take 1 tablet (2.5 mg total) by mouth once daily.  Dispense: 90 tablet; Refill: 1  -     Hemoglobin A1C;  Future; Expected date: 12/02/2020    Thoracic aortic aneurysm without rupture  -     carvediloL (COREG) 3.125 MG tablet; Take 1 tablet (3.125 mg total) by mouth 2 (two) times daily with meals.  Dispense: 180 tablet; Refill: 1    Primary osteoarthritis involving multiple joints    ZABRINA (obstructive sleep apnea)    Essential hypertension  -     carvediloL (COREG) 3.125 MG tablet; Take 1 tablet (3.125 mg total) by mouth 2 (two) times daily with meals.  Dispense: 180 tablet; Refill: 1    Mixed stress and urge urinary incontinence  -     oxybutynin (DITROPAN-XL) 10 MG 24 hr tablet; Take 2 tablets (20 mg total) by mouth once daily.  Dispense: 180 tablet; Refill: 1    Essential hypertension  Comments:  Controlled  Orders:  -     carvediloL (COREG) 3.125 MG tablet; Take 1 tablet (3.125 mg total) by mouth 2 (two) times daily with meals.  Dispense: 180 tablet; Refill: 1    Thoracic aortic aneurysm without rupture  Comments:  stable  Orders:  -     carvediloL (COREG) 3.125 MG tablet; Take 1 tablet (3.125 mg total) by mouth 2 (two) times daily with meals.  Dispense: 180 tablet; Refill: 1    Intertrigo  Comments:  proper skin care      Follow up in about 4 months (around 4/2/2021) for Diabetic Check-Up.

## 2020-12-02 NOTE — PATIENT INSTRUCTIONS
Take metformin 2 times a day with meals.  wrap ankles.. Apply Desitin or Luan Butt Past to skin   Strong peripheral pulses

## 2021-03-30 LAB — HBA1C MFR BLD: 6.8 % OF TOTAL HGB

## 2021-04-01 ENCOUNTER — OFFICE VISIT (OUTPATIENT)
Dept: FAMILY MEDICINE | Facility: CLINIC | Age: 61
End: 2021-04-01
Payer: MEDICARE

## 2021-04-01 VITALS
BODY MASS INDEX: 45.99 KG/M2 | DIASTOLIC BLOOD PRESSURE: 72 MMHG | SYSTOLIC BLOOD PRESSURE: 112 MMHG | HEIGHT: 67 IN | HEART RATE: 73 BPM | WEIGHT: 293 LBS

## 2021-04-01 DIAGNOSIS — I71.20 THORACIC AORTIC ANEURYSM WITHOUT RUPTURE: ICD-10-CM

## 2021-04-01 DIAGNOSIS — E66.01 MORBID OBESITY: ICD-10-CM

## 2021-04-01 DIAGNOSIS — M15.9 PRIMARY OSTEOARTHRITIS INVOLVING MULTIPLE JOINTS: ICD-10-CM

## 2021-04-01 DIAGNOSIS — E11.9 ENCOUNTER FOR DIABETIC FOOT EXAM: ICD-10-CM

## 2021-04-01 DIAGNOSIS — M46.96 INFLAMMATORY SPONDYLOPATHY OF LUMBAR REGION: ICD-10-CM

## 2021-04-01 DIAGNOSIS — E11.9 TYPE 2 DIABETES MELLITUS WITHOUT COMPLICATION, WITHOUT LONG-TERM CURRENT USE OF INSULIN: Primary | ICD-10-CM

## 2021-04-01 DIAGNOSIS — I10 ESSENTIAL HYPERTENSION: ICD-10-CM

## 2021-04-01 PROCEDURE — 3074F SYST BP LT 130 MM HG: CPT | Mod: S$GLB,,, | Performed by: FAMILY MEDICINE

## 2021-04-01 PROCEDURE — 3008F PR BODY MASS INDEX (BMI) DOCUMENTED: ICD-10-PCS | Mod: S$GLB,,, | Performed by: FAMILY MEDICINE

## 2021-04-01 PROCEDURE — 3078F PR MOST RECENT DIASTOLIC BLOOD PRESSURE < 80 MM HG: ICD-10-PCS | Mod: S$GLB,,, | Performed by: FAMILY MEDICINE

## 2021-04-01 PROCEDURE — 3008F BODY MASS INDEX DOCD: CPT | Mod: S$GLB,,, | Performed by: FAMILY MEDICINE

## 2021-04-01 PROCEDURE — 99214 PR OFFICE/OUTPT VISIT, EST, LEVL IV, 30-39 MIN: ICD-10-PCS | Mod: S$GLB,,, | Performed by: FAMILY MEDICINE

## 2021-04-01 PROCEDURE — 3078F DIAST BP <80 MM HG: CPT | Mod: S$GLB,,, | Performed by: FAMILY MEDICINE

## 2021-04-01 PROCEDURE — 99214 OFFICE O/P EST MOD 30 MIN: CPT | Mod: S$GLB,,, | Performed by: FAMILY MEDICINE

## 2021-04-01 PROCEDURE — 3044F HG A1C LEVEL LT 7.0%: CPT | Mod: S$GLB,,, | Performed by: FAMILY MEDICINE

## 2021-04-01 PROCEDURE — 3074F PR MOST RECENT SYSTOLIC BLOOD PRESSURE < 130 MM HG: ICD-10-PCS | Mod: S$GLB,,, | Performed by: FAMILY MEDICINE

## 2021-04-01 PROCEDURE — 3044F PR MOST RECENT HEMOGLOBIN A1C LEVEL <7.0%: ICD-10-PCS | Mod: S$GLB,,, | Performed by: FAMILY MEDICINE

## 2021-04-01 RX ORDER — ATORVASTATIN CALCIUM 10 MG/1
10 TABLET, FILM COATED ORAL DAILY
Qty: 90 TABLET | Refills: 1 | Status: SHIPPED | OUTPATIENT
Start: 2021-04-01 | End: 2021-08-03 | Stop reason: SDUPTHER

## 2021-04-01 RX ORDER — LISINOPRIL 2.5 MG/1
2.5 TABLET ORAL DAILY
Qty: 90 TABLET | Refills: 1 | Status: SHIPPED | OUTPATIENT
Start: 2021-04-01 | End: 2021-08-03 | Stop reason: SDUPTHER

## 2021-06-14 DIAGNOSIS — E11.9 TYPE 2 DIABETES MELLITUS WITHOUT COMPLICATION, WITHOUT LONG-TERM CURRENT USE OF INSULIN: Primary | ICD-10-CM

## 2021-06-15 RX ORDER — METFORMIN HYDROCHLORIDE 750 MG/1
750 TABLET, EXTENDED RELEASE ORAL
Qty: 180 TABLET | Refills: 1 | Status: SHIPPED | OUTPATIENT
Start: 2021-06-15 | End: 2022-02-01 | Stop reason: SDUPTHER

## 2021-07-29 ENCOUNTER — TELEPHONE (OUTPATIENT)
Dept: FAMILY MEDICINE | Facility: CLINIC | Age: 61
End: 2021-07-29

## 2021-07-29 DIAGNOSIS — E66.01 MORBID OBESITY: ICD-10-CM

## 2021-07-29 DIAGNOSIS — M47.896 OTHER SPONDYLOSIS, LUMBAR REGION: ICD-10-CM

## 2021-07-29 DIAGNOSIS — E11.9 TYPE 2 DIABETES MELLITUS WITHOUT COMPLICATION, WITHOUT LONG-TERM CURRENT USE OF INSULIN: Primary | ICD-10-CM

## 2021-07-29 DIAGNOSIS — I10 ESSENTIAL HYPERTENSION: ICD-10-CM

## 2021-08-03 ENCOUNTER — OFFICE VISIT (OUTPATIENT)
Dept: FAMILY MEDICINE | Facility: CLINIC | Age: 61
End: 2021-08-03
Payer: MEDICARE

## 2021-08-03 VITALS
BODY MASS INDEX: 45.99 KG/M2 | DIASTOLIC BLOOD PRESSURE: 80 MMHG | SYSTOLIC BLOOD PRESSURE: 124 MMHG | HEIGHT: 67 IN | WEIGHT: 293 LBS | OXYGEN SATURATION: 97 % | HEART RATE: 63 BPM

## 2021-08-03 DIAGNOSIS — E11.9 TYPE 2 DIABETES MELLITUS WITHOUT COMPLICATION, WITHOUT LONG-TERM CURRENT USE OF INSULIN: Primary | ICD-10-CM

## 2021-08-03 DIAGNOSIS — G47.33 OSA (OBSTRUCTIVE SLEEP APNEA): ICD-10-CM

## 2021-08-03 DIAGNOSIS — S90.811S ABRASION OF RIGHT FOOT, SEQUELA: ICD-10-CM

## 2021-08-03 DIAGNOSIS — E66.01 MORBID OBESITY: ICD-10-CM

## 2021-08-03 DIAGNOSIS — Z12.31 ENCOUNTER FOR SCREENING MAMMOGRAM FOR MALIGNANT NEOPLASM OF BREAST: ICD-10-CM

## 2021-08-03 DIAGNOSIS — I10 ESSENTIAL HYPERTENSION: ICD-10-CM

## 2021-08-03 DIAGNOSIS — M15.9 PRIMARY OSTEOARTHRITIS INVOLVING MULTIPLE JOINTS: ICD-10-CM

## 2021-08-03 DIAGNOSIS — I71.20 THORACIC AORTIC ANEURYSM WITHOUT RUPTURE: ICD-10-CM

## 2021-08-03 DIAGNOSIS — N39.46 MIXED STRESS AND URGE URINARY INCONTINENCE: ICD-10-CM

## 2021-08-03 PROCEDURE — 3008F BODY MASS INDEX DOCD: CPT | Mod: S$GLB,,, | Performed by: FAMILY MEDICINE

## 2021-08-03 PROCEDURE — 99214 OFFICE O/P EST MOD 30 MIN: CPT | Mod: S$GLB,,, | Performed by: FAMILY MEDICINE

## 2021-08-03 PROCEDURE — 1160F RVW MEDS BY RX/DR IN RCRD: CPT | Mod: S$GLB,,, | Performed by: FAMILY MEDICINE

## 2021-08-03 PROCEDURE — 3044F HG A1C LEVEL LT 7.0%: CPT | Mod: S$GLB,,, | Performed by: FAMILY MEDICINE

## 2021-08-03 PROCEDURE — 99214 PR OFFICE/OUTPT VISIT, EST, LEVL IV, 30-39 MIN: ICD-10-PCS | Mod: S$GLB,,, | Performed by: FAMILY MEDICINE

## 2021-08-03 PROCEDURE — 3008F PR BODY MASS INDEX (BMI) DOCUMENTED: ICD-10-PCS | Mod: S$GLB,,, | Performed by: FAMILY MEDICINE

## 2021-08-03 PROCEDURE — 3074F PR MOST RECENT SYSTOLIC BLOOD PRESSURE < 130 MM HG: ICD-10-PCS | Mod: S$GLB,,, | Performed by: FAMILY MEDICINE

## 2021-08-03 PROCEDURE — 1160F PR REVIEW ALL MEDS BY PRESCRIBER/CLIN PHARMACIST DOCUMENTED: ICD-10-PCS | Mod: S$GLB,,, | Performed by: FAMILY MEDICINE

## 2021-08-03 PROCEDURE — 3074F SYST BP LT 130 MM HG: CPT | Mod: S$GLB,,, | Performed by: FAMILY MEDICINE

## 2021-08-03 PROCEDURE — 3079F PR MOST RECENT DIASTOLIC BLOOD PRESSURE 80-89 MM HG: ICD-10-PCS | Mod: S$GLB,,, | Performed by: FAMILY MEDICINE

## 2021-08-03 PROCEDURE — 3079F DIAST BP 80-89 MM HG: CPT | Mod: S$GLB,,, | Performed by: FAMILY MEDICINE

## 2021-08-03 PROCEDURE — 3044F PR MOST RECENT HEMOGLOBIN A1C LEVEL <7.0%: ICD-10-PCS | Mod: S$GLB,,, | Performed by: FAMILY MEDICINE

## 2021-08-03 RX ORDER — CARVEDILOL 3.12 MG/1
3.12 TABLET ORAL 2 TIMES DAILY WITH MEALS
Qty: 180 TABLET | Refills: 1 | Status: SHIPPED | OUTPATIENT
Start: 2021-08-03 | End: 2022-02-01 | Stop reason: SDUPTHER

## 2021-08-03 RX ORDER — ATORVASTATIN CALCIUM 10 MG/1
10 TABLET, FILM COATED ORAL DAILY
Qty: 90 TABLET | Refills: 1 | Status: SHIPPED | OUTPATIENT
Start: 2021-08-03 | End: 2022-02-01 | Stop reason: SDUPTHER

## 2021-08-03 RX ORDER — OXYBUTYNIN CHLORIDE 10 MG/1
20 TABLET, EXTENDED RELEASE ORAL DAILY
Qty: 180 TABLET | Refills: 1 | Status: SHIPPED | OUTPATIENT
Start: 2021-08-03 | End: 2022-02-01 | Stop reason: SDUPTHER

## 2021-08-03 RX ORDER — LISINOPRIL 2.5 MG/1
2.5 TABLET ORAL DAILY
Qty: 90 TABLET | Refills: 1 | Status: SHIPPED | OUTPATIENT
Start: 2021-08-03 | End: 2022-02-01 | Stop reason: SDUPTHER

## 2021-08-25 ENCOUNTER — HOSPITAL ENCOUNTER (OUTPATIENT)
Dept: RADIOLOGY | Facility: HOSPITAL | Age: 61
Discharge: HOME | End: 2021-08-25
Attending: FAMILY MEDICINE
Payer: MEDICARE

## 2021-08-25 VITALS — WEIGHT: 293 LBS | HEIGHT: 67 IN | BODY MASS INDEX: 45.99 KG/M2

## 2021-08-25 DIAGNOSIS — Z12.31 ENCOUNTER FOR SCREENING MAMMOGRAM FOR MALIGNANT NEOPLASM OF BREAST: ICD-10-CM

## 2021-08-25 PROCEDURE — 77067 SCR MAMMO BI INCL CAD: CPT | Mod: TC,PO

## 2021-09-16 ENCOUNTER — TELEPHONE (OUTPATIENT)
Dept: FAMILY MEDICINE | Facility: CLINIC | Age: 61
End: 2021-09-16

## 2021-09-16 DIAGNOSIS — G47.33 OSA (OBSTRUCTIVE SLEEP APNEA): Primary | ICD-10-CM

## 2021-09-29 ENCOUNTER — TELEPHONE (OUTPATIENT)
Dept: FAMILY MEDICINE | Facility: CLINIC | Age: 61
End: 2021-09-29

## 2021-09-30 ENCOUNTER — OFFICE VISIT (OUTPATIENT)
Dept: FAMILY MEDICINE | Facility: CLINIC | Age: 61
End: 2021-09-30
Payer: MEDICARE

## 2021-09-30 VITALS
HEART RATE: 63 BPM | WEIGHT: 293 LBS | HEIGHT: 67 IN | DIASTOLIC BLOOD PRESSURE: 76 MMHG | BODY MASS INDEX: 45.99 KG/M2 | SYSTOLIC BLOOD PRESSURE: 142 MMHG

## 2021-09-30 DIAGNOSIS — I89.0 LYMPHEDEMA OF BOTH LOWER EXTREMITIES: Primary | ICD-10-CM

## 2021-09-30 DIAGNOSIS — E11.9 TYPE 2 DIABETES MELLITUS WITHOUT COMPLICATION, WITHOUT LONG-TERM CURRENT USE OF INSULIN: ICD-10-CM

## 2021-09-30 DIAGNOSIS — Z23 FLU VACCINE NEED: ICD-10-CM

## 2021-09-30 DIAGNOSIS — I10 ESSENTIAL HYPERTENSION: ICD-10-CM

## 2021-09-30 PROCEDURE — 3078F DIAST BP <80 MM HG: CPT | Mod: S$GLB,,, | Performed by: FAMILY MEDICINE

## 2021-09-30 PROCEDURE — 3077F SYST BP >= 140 MM HG: CPT | Mod: S$GLB,,, | Performed by: FAMILY MEDICINE

## 2021-09-30 PROCEDURE — 3078F PR MOST RECENT DIASTOLIC BLOOD PRESSURE < 80 MM HG: ICD-10-PCS | Mod: S$GLB,,, | Performed by: FAMILY MEDICINE

## 2021-09-30 PROCEDURE — 4010F ACE/ARB THERAPY RXD/TAKEN: CPT | Mod: S$GLB,,, | Performed by: FAMILY MEDICINE

## 2021-09-30 PROCEDURE — 3008F PR BODY MASS INDEX (BMI) DOCUMENTED: ICD-10-PCS | Mod: S$GLB,,, | Performed by: FAMILY MEDICINE

## 2021-09-30 PROCEDURE — 99214 PR OFFICE/OUTPT VISIT, EST, LEVL IV, 30-39 MIN: ICD-10-PCS | Mod: 25,S$GLB,, | Performed by: FAMILY MEDICINE

## 2021-09-30 PROCEDURE — 4010F PR ACE/ARB THEARPY RXD/TAKEN: ICD-10-PCS | Mod: S$GLB,,, | Performed by: FAMILY MEDICINE

## 2021-09-30 PROCEDURE — G0008 FLU VACCINE - QUADRIVALENT (RECOMBINANT) PRESERVATIVE FREE: ICD-10-PCS | Mod: S$GLB,,, | Performed by: FAMILY MEDICINE

## 2021-09-30 PROCEDURE — 3077F PR MOST RECENT SYSTOLIC BLOOD PRESSURE >= 140 MM HG: ICD-10-PCS | Mod: S$GLB,,, | Performed by: FAMILY MEDICINE

## 2021-09-30 PROCEDURE — 90682 FLU VACCINE - QUADRIVALENT (RECOMBINANT) PRESERVATIVE FREE: ICD-10-PCS | Mod: S$GLB,,, | Performed by: FAMILY MEDICINE

## 2021-09-30 PROCEDURE — 99214 OFFICE O/P EST MOD 30 MIN: CPT | Mod: 25,S$GLB,, | Performed by: FAMILY MEDICINE

## 2021-09-30 PROCEDURE — 3044F PR MOST RECENT HEMOGLOBIN A1C LEVEL <7.0%: ICD-10-PCS | Mod: S$GLB,,, | Performed by: FAMILY MEDICINE

## 2021-09-30 PROCEDURE — 3044F HG A1C LEVEL LT 7.0%: CPT | Mod: S$GLB,,, | Performed by: FAMILY MEDICINE

## 2021-09-30 PROCEDURE — 3008F BODY MASS INDEX DOCD: CPT | Mod: S$GLB,,, | Performed by: FAMILY MEDICINE

## 2021-09-30 PROCEDURE — 90682 RIV4 VACC RECOMBINANT DNA IM: CPT | Mod: S$GLB,,, | Performed by: FAMILY MEDICINE

## 2021-09-30 PROCEDURE — G0008 ADMIN INFLUENZA VIRUS VAC: HCPCS | Mod: S$GLB,,, | Performed by: FAMILY MEDICINE

## 2021-09-30 RX ORDER — POTASSIUM CHLORIDE 750 MG/1
10 TABLET, EXTENDED RELEASE ORAL DAILY
Qty: 90 TABLET | Refills: 1 | Status: SHIPPED | OUTPATIENT
Start: 2021-09-30 | End: 2023-02-14

## 2021-09-30 RX ORDER — FUROSEMIDE 20 MG/1
20 TABLET ORAL EVERY MORNING
Qty: 90 TABLET | Refills: 1 | Status: SHIPPED | OUTPATIENT
Start: 2021-09-30 | End: 2023-02-14

## 2021-10-06 ENCOUNTER — CLINICAL SUPPORT (OUTPATIENT)
Dept: REHABILITATION | Facility: HOSPITAL | Age: 61
End: 2021-10-06
Payer: MEDICARE

## 2021-10-06 DIAGNOSIS — I89.0 LYMPHEDEMA OF BOTH LOWER EXTREMITIES: ICD-10-CM

## 2021-10-06 DIAGNOSIS — R60.0 LOWER EXTREMITY EDEMA: Primary | ICD-10-CM

## 2021-10-06 PROCEDURE — 97166 OT EVAL MOD COMPLEX 45 MIN: CPT | Mod: PN

## 2021-10-08 PROBLEM — I89.0 LYMPHEDEMA OF LEFT LOWER EXTREMITY: Status: ACTIVE | Noted: 2021-10-08

## 2021-10-11 ENCOUNTER — CLINICAL SUPPORT (OUTPATIENT)
Dept: REHABILITATION | Facility: HOSPITAL | Age: 61
End: 2021-10-11
Payer: MEDICARE

## 2021-10-11 DIAGNOSIS — I89.0 LYMPHEDEMA OF BOTH LOWER EXTREMITIES: Primary | ICD-10-CM

## 2021-10-11 PROCEDURE — 97140 MANUAL THERAPY 1/> REGIONS: CPT | Mod: PN

## 2021-10-12 ENCOUNTER — CLINICAL SUPPORT (OUTPATIENT)
Dept: REHABILITATION | Facility: HOSPITAL | Age: 61
End: 2021-10-12
Payer: MEDICARE

## 2021-10-12 DIAGNOSIS — I89.0 LYMPHEDEMA OF BOTH LOWER EXTREMITIES: Primary | ICD-10-CM

## 2021-10-12 PROCEDURE — 97140 MANUAL THERAPY 1/> REGIONS: CPT | Mod: PN

## 2021-10-20 ENCOUNTER — CLINICAL SUPPORT (OUTPATIENT)
Dept: REHABILITATION | Facility: HOSPITAL | Age: 61
End: 2021-10-20
Payer: MEDICARE

## 2021-10-20 DIAGNOSIS — I89.0 LYMPHEDEMA OF BOTH LOWER EXTREMITIES: Primary | ICD-10-CM

## 2021-10-20 PROCEDURE — 97140 MANUAL THERAPY 1/> REGIONS: CPT | Mod: PN

## 2021-10-22 ENCOUNTER — CLINICAL SUPPORT (OUTPATIENT)
Dept: REHABILITATION | Facility: HOSPITAL | Age: 61
End: 2021-10-22
Payer: MEDICARE

## 2021-10-22 DIAGNOSIS — I89.0 LYMPHEDEMA OF BOTH LOWER EXTREMITIES: Primary | ICD-10-CM

## 2021-10-22 PROCEDURE — 97140 MANUAL THERAPY 1/> REGIONS: CPT | Mod: PN

## 2021-10-29 ENCOUNTER — CLINICAL SUPPORT (OUTPATIENT)
Dept: REHABILITATION | Facility: HOSPITAL | Age: 61
End: 2021-10-29
Payer: MEDICARE

## 2021-10-29 DIAGNOSIS — I89.0 LYMPHEDEMA OF BOTH LOWER EXTREMITIES: Primary | ICD-10-CM

## 2021-10-29 PROCEDURE — 97140 MANUAL THERAPY 1/> REGIONS: CPT | Mod: PN

## 2021-11-02 ENCOUNTER — CLINICAL SUPPORT (OUTPATIENT)
Dept: REHABILITATION | Facility: HOSPITAL | Age: 61
End: 2021-11-02
Payer: MEDICARE

## 2021-11-02 DIAGNOSIS — R60.0 LOWER EXTREMITY EDEMA: Primary | ICD-10-CM

## 2021-11-02 PROCEDURE — 97140 MANUAL THERAPY 1/> REGIONS: CPT | Mod: PN

## 2021-11-12 ENCOUNTER — CLINICAL SUPPORT (OUTPATIENT)
Dept: REHABILITATION | Facility: HOSPITAL | Age: 61
End: 2021-11-12
Payer: MEDICARE

## 2021-11-12 DIAGNOSIS — I89.0 LYMPHEDEMA OF BOTH LOWER EXTREMITIES: ICD-10-CM

## 2021-11-12 DIAGNOSIS — R60.0 LOWER EXTREMITY EDEMA: Primary | ICD-10-CM

## 2021-11-12 PROCEDURE — 97140 MANUAL THERAPY 1/> REGIONS: CPT | Mod: PN

## 2021-11-15 ENCOUNTER — CLINICAL SUPPORT (OUTPATIENT)
Dept: REHABILITATION | Facility: HOSPITAL | Age: 61
End: 2021-11-15
Payer: MEDICARE

## 2021-11-15 DIAGNOSIS — R60.0 LOWER EXTREMITY EDEMA: Primary | ICD-10-CM

## 2021-11-15 PROCEDURE — 97140 MANUAL THERAPY 1/> REGIONS: CPT | Mod: PN

## 2021-11-24 NOTE — TELEPHONE ENCOUNTER
Testing needed if she has been in contact with his blood or body fluids. Also need to know which type of hepatitis   Yes

## 2021-11-30 ENCOUNTER — CLINICAL SUPPORT (OUTPATIENT)
Dept: REHABILITATION | Facility: HOSPITAL | Age: 61
End: 2021-11-30
Payer: MEDICARE

## 2021-11-30 DIAGNOSIS — R60.0 LOWER EXTREMITY EDEMA: Primary | ICD-10-CM

## 2021-11-30 PROCEDURE — 97140 MANUAL THERAPY 1/> REGIONS: CPT | Mod: PN

## 2021-12-03 ENCOUNTER — CLINICAL SUPPORT (OUTPATIENT)
Dept: REHABILITATION | Facility: HOSPITAL | Age: 61
End: 2021-12-03
Payer: MEDICARE

## 2021-12-03 DIAGNOSIS — R60.0 LOWER EXTREMITY EDEMA: Primary | ICD-10-CM

## 2021-12-03 PROCEDURE — 97140 MANUAL THERAPY 1/> REGIONS: CPT | Mod: PN

## 2021-12-06 ENCOUNTER — CLINICAL SUPPORT (OUTPATIENT)
Dept: REHABILITATION | Facility: HOSPITAL | Age: 61
End: 2021-12-06
Payer: MEDICARE

## 2021-12-06 DIAGNOSIS — I89.0 LYMPHEDEMA OF BOTH LOWER EXTREMITIES: Primary | ICD-10-CM

## 2021-12-06 PROCEDURE — 97140 MANUAL THERAPY 1/> REGIONS: CPT | Mod: PN

## 2021-12-13 ENCOUNTER — CLINICAL SUPPORT (OUTPATIENT)
Dept: REHABILITATION | Facility: HOSPITAL | Age: 61
End: 2021-12-13
Payer: MEDICARE

## 2021-12-13 DIAGNOSIS — I89.0 LYMPHEDEMA OF BOTH LOWER EXTREMITIES: Primary | ICD-10-CM

## 2021-12-13 PROCEDURE — 97140 MANUAL THERAPY 1/> REGIONS: CPT | Mod: PN

## 2022-01-12 ENCOUNTER — IMMUNIZATION (OUTPATIENT)
Dept: PRIMARY CARE CLINIC | Facility: CLINIC | Age: 62
End: 2022-01-12
Payer: MEDICARE

## 2022-01-12 DIAGNOSIS — Z23 NEED FOR VACCINATION: Primary | ICD-10-CM

## 2022-01-12 PROCEDURE — 91306 COVID-19, MRNA, LNP-S, PF, 100 MCG/0.25 ML DOSE VACCINE (MODERNA BOOSTER): ICD-10-PCS | Mod: S$GLB,,, | Performed by: FAMILY MEDICINE

## 2022-01-12 PROCEDURE — 0064A COVID-19, MRNA, LNP-S, PF, 100 MCG/0.25 ML DOSE VACCINE (MODERNA BOOSTER): CPT | Mod: S$GLB,,, | Performed by: FAMILY MEDICINE

## 2022-01-12 PROCEDURE — 91306 COVID-19, MRNA, LNP-S, PF, 100 MCG/0.25 ML DOSE VACCINE (MODERNA BOOSTER): CPT | Mod: S$GLB,,, | Performed by: FAMILY MEDICINE

## 2022-01-12 PROCEDURE — 0064A COVID-19, MRNA, LNP-S, PF, 100 MCG/0.25 ML DOSE VACCINE (MODERNA BOOSTER): ICD-10-PCS | Mod: S$GLB,,, | Performed by: FAMILY MEDICINE

## 2022-01-18 ENCOUNTER — TELEPHONE (OUTPATIENT)
Dept: FAMILY MEDICINE | Facility: CLINIC | Age: 62
End: 2022-01-18
Payer: MEDICARE

## 2022-01-18 NOTE — TELEPHONE ENCOUNTER
----- Message from RT [place] sent at 1/17/2022 12:57 PM CST -----    ----- Message -----  From: Katya Hummel MD  Sent: 1/17/2022  12:00 AM CST  To: Katya Hummel Staff    Remind patient to get labs prior to upcoming appointment

## 2022-01-18 NOTE — TELEPHONE ENCOUNTER
Spoke with patient reminded to complete labs.  Patient verbalized understanding.  Informed to return call with questions/concerns -DN

## 2022-01-19 ENCOUNTER — LAB VISIT (OUTPATIENT)
Dept: PRIMARY CARE CLINIC | Facility: OTHER | Age: 62
End: 2022-01-19
Attending: INTERNAL MEDICINE
Payer: MEDICARE

## 2022-01-19 DIAGNOSIS — Z20.822 ENCOUNTER FOR LABORATORY TESTING FOR COVID-19 VIRUS: ICD-10-CM

## 2022-01-19 PROCEDURE — U0003 INFECTIOUS AGENT DETECTION BY NUCLEIC ACID (DNA OR RNA); SEVERE ACUTE RESPIRATORY SYNDROME CORONAVIRUS 2 (SARS-COV-2) (CORONAVIRUS DISEASE [COVID-19]), AMPLIFIED PROBE TECHNIQUE, MAKING USE OF HIGH THROUGHPUT TECHNOLOGIES AS DESCRIBED BY CMS-2020-01-R: HCPCS | Performed by: INTERNAL MEDICINE

## 2022-01-20 LAB
SARS-COV-2 RNA RESP QL NAA+PROBE: NOT DETECTED
SARS-COV-2- CYCLE NUMBER: NORMAL

## 2022-01-25 LAB
ALBUMIN SERPL-MCNC: 4.3 G/DL (ref 3.6–5.1)
ALBUMIN/CREAT UR: 2 MCG/MG CREAT
ALBUMIN/GLOB SERPL: 1.4 (CALC) (ref 1–2.5)
ALP SERPL-CCNC: 61 U/L (ref 37–153)
ALT SERPL-CCNC: 11 U/L (ref 6–29)
APPEARANCE UR: CLEAR
AST SERPL-CCNC: 10 U/L (ref 10–35)
BACTERIA #/AREA URNS HPF: ABNORMAL /HPF
BACTERIA UR CULT: ABNORMAL
BILIRUB SERPL-MCNC: 0.4 MG/DL (ref 0.2–1.2)
BILIRUB UR QL STRIP: NEGATIVE
BUN SERPL-MCNC: 17 MG/DL (ref 7–25)
BUN/CREAT SERPL: ABNORMAL (CALC) (ref 6–22)
CALCIUM SERPL-MCNC: 9.9 MG/DL (ref 8.6–10.4)
CHLORIDE SERPL-SCNC: 102 MMOL/L (ref 98–110)
CHOLEST SERPL-MCNC: 141 MG/DL
CHOLEST/HDLC SERPL: 2.8 (CALC)
CO2 SERPL-SCNC: 29 MMOL/L (ref 20–32)
COLOR UR: YELLOW
CREAT SERPL-MCNC: 0.83 MG/DL (ref 0.5–0.99)
CREAT UR-MCNC: 146 MG/DL (ref 20–275)
GLOBULIN SER CALC-MCNC: 3 G/DL (CALC) (ref 1.9–3.7)
GLUCOSE SERPL-MCNC: 102 MG/DL (ref 65–99)
GLUCOSE UR QL STRIP: NEGATIVE
HBA1C MFR BLD: 6.7 % OF TOTAL HGB
HDLC SERPL-MCNC: 50 MG/DL
HGB UR QL STRIP: NEGATIVE
HYALINE CASTS #/AREA URNS LPF: ABNORMAL /LPF
KETONES UR QL STRIP: NEGATIVE
LDLC SERPL CALC-MCNC: 71 MG/DL (CALC)
LEUKOCYTE ESTERASE UR QL STRIP: NEGATIVE
MICROALBUMIN UR-MCNC: 0.3 MG/DL
NITRITE UR QL STRIP: NEGATIVE
NONHDLC SERPL-MCNC: 91 MG/DL (CALC)
PH UR STRIP: ABNORMAL [PH] (ref 5–8)
POTASSIUM SERPL-SCNC: 4.5 MMOL/L (ref 3.5–5.3)
PROT SERPL-MCNC: 7.3 G/DL (ref 6.1–8.1)
PROT UR QL STRIP: NEGATIVE
RBC #/AREA URNS HPF: ABNORMAL /HPF
SODIUM SERPL-SCNC: 141 MMOL/L (ref 135–146)
SP GR UR STRIP: 1.02 (ref 1–1.03)
SQUAMOUS #/AREA URNS HPF: ABNORMAL /HPF
TRIGL SERPL-MCNC: 116 MG/DL
TSH SERPL-ACNC: 1.21 MIU/L (ref 0.4–4.5)
WBC #/AREA URNS HPF: ABNORMAL /HPF

## 2022-02-01 ENCOUNTER — OFFICE VISIT (OUTPATIENT)
Dept: FAMILY MEDICINE | Facility: CLINIC | Age: 62
End: 2022-02-01
Payer: MEDICARE

## 2022-02-01 VITALS
WEIGHT: 293 LBS | BODY MASS INDEX: 45.99 KG/M2 | HEIGHT: 67 IN | SYSTOLIC BLOOD PRESSURE: 130 MMHG | DIASTOLIC BLOOD PRESSURE: 66 MMHG | HEART RATE: 68 BPM

## 2022-02-01 DIAGNOSIS — I10 ESSENTIAL HYPERTENSION: ICD-10-CM

## 2022-02-01 DIAGNOSIS — M46.96 INFLAMMATORY SPONDYLOPATHY OF LUMBAR REGION: ICD-10-CM

## 2022-02-01 DIAGNOSIS — N39.46 MIXED STRESS AND URGE URINARY INCONTINENCE: ICD-10-CM

## 2022-02-01 DIAGNOSIS — I89.0 LYMPHEDEMA OF BOTH LOWER EXTREMITIES: ICD-10-CM

## 2022-02-01 DIAGNOSIS — E66.01 MORBID OBESITY: ICD-10-CM

## 2022-02-01 DIAGNOSIS — I71.20 THORACIC AORTIC ANEURYSM WITHOUT RUPTURE: ICD-10-CM

## 2022-02-01 DIAGNOSIS — E11.9 TYPE 2 DIABETES MELLITUS WITHOUT COMPLICATION, WITHOUT LONG-TERM CURRENT USE OF INSULIN: ICD-10-CM

## 2022-02-01 PROCEDURE — 3078F PR MOST RECENT DIASTOLIC BLOOD PRESSURE < 80 MM HG: ICD-10-PCS | Mod: S$GLB,,, | Performed by: FAMILY MEDICINE

## 2022-02-01 PROCEDURE — 3061F PR NEG MICROALBUMINURIA RESULT DOCUMENTED/REVIEW: ICD-10-PCS | Mod: S$GLB,,, | Performed by: FAMILY MEDICINE

## 2022-02-01 PROCEDURE — 3075F SYST BP GE 130 - 139MM HG: CPT | Mod: S$GLB,,, | Performed by: FAMILY MEDICINE

## 2022-02-01 PROCEDURE — 99214 OFFICE O/P EST MOD 30 MIN: CPT | Mod: S$GLB,,, | Performed by: FAMILY MEDICINE

## 2022-02-01 PROCEDURE — 3066F PR DOCUMENTATION OF TREATMENT FOR NEPHROPATHY: ICD-10-PCS | Mod: S$GLB,,, | Performed by: FAMILY MEDICINE

## 2022-02-01 PROCEDURE — 1160F RVW MEDS BY RX/DR IN RCRD: CPT | Mod: S$GLB,,, | Performed by: FAMILY MEDICINE

## 2022-02-01 PROCEDURE — 3061F NEG MICROALBUMINURIA REV: CPT | Mod: S$GLB,,, | Performed by: FAMILY MEDICINE

## 2022-02-01 PROCEDURE — 3044F HG A1C LEVEL LT 7.0%: CPT | Mod: S$GLB,,, | Performed by: FAMILY MEDICINE

## 2022-02-01 PROCEDURE — 3008F PR BODY MASS INDEX (BMI) DOCUMENTED: ICD-10-PCS | Mod: S$GLB,,, | Performed by: FAMILY MEDICINE

## 2022-02-01 PROCEDURE — 3075F PR MOST RECENT SYSTOLIC BLOOD PRESS GE 130-139MM HG: ICD-10-PCS | Mod: S$GLB,,, | Performed by: FAMILY MEDICINE

## 2022-02-01 PROCEDURE — 3066F NEPHROPATHY DOC TX: CPT | Mod: S$GLB,,, | Performed by: FAMILY MEDICINE

## 2022-02-01 PROCEDURE — 3044F PR MOST RECENT HEMOGLOBIN A1C LEVEL <7.0%: ICD-10-PCS | Mod: S$GLB,,, | Performed by: FAMILY MEDICINE

## 2022-02-01 PROCEDURE — 3078F DIAST BP <80 MM HG: CPT | Mod: S$GLB,,, | Performed by: FAMILY MEDICINE

## 2022-02-01 PROCEDURE — 1160F PR REVIEW ALL MEDS BY PRESCRIBER/CLIN PHARMACIST DOCUMENTED: ICD-10-PCS | Mod: S$GLB,,, | Performed by: FAMILY MEDICINE

## 2022-02-01 PROCEDURE — 99214 PR OFFICE/OUTPT VISIT, EST, LEVL IV, 30-39 MIN: ICD-10-PCS | Mod: S$GLB,,, | Performed by: FAMILY MEDICINE

## 2022-02-01 PROCEDURE — 3008F BODY MASS INDEX DOCD: CPT | Mod: S$GLB,,, | Performed by: FAMILY MEDICINE

## 2022-02-01 RX ORDER — LISINOPRIL 2.5 MG/1
2.5 TABLET ORAL DAILY
Qty: 90 TABLET | Refills: 1 | Status: SHIPPED | OUTPATIENT
Start: 2022-02-01 | End: 2022-08-11 | Stop reason: SDUPTHER

## 2022-02-01 RX ORDER — CARVEDILOL 3.12 MG/1
3.12 TABLET ORAL 2 TIMES DAILY WITH MEALS
Qty: 180 TABLET | Refills: 1 | Status: SHIPPED | OUTPATIENT
Start: 2022-02-01 | End: 2022-08-11 | Stop reason: SDUPTHER

## 2022-02-01 RX ORDER — FUROSEMIDE 20 MG/1
20 TABLET ORAL EVERY MORNING
Qty: 90 TABLET | Refills: 1 | Status: CANCELLED | OUTPATIENT
Start: 2022-02-01 | End: 2023-02-01

## 2022-02-01 RX ORDER — ATORVASTATIN CALCIUM 10 MG/1
10 TABLET, FILM COATED ORAL DAILY
Qty: 90 TABLET | Refills: 1 | Status: SHIPPED | OUTPATIENT
Start: 2022-02-01 | End: 2022-08-11 | Stop reason: SDUPTHER

## 2022-02-01 RX ORDER — METFORMIN HYDROCHLORIDE 750 MG/1
750 TABLET, EXTENDED RELEASE ORAL
Qty: 180 TABLET | Refills: 1 | Status: SHIPPED | OUTPATIENT
Start: 2022-02-01 | End: 2022-08-01 | Stop reason: SDUPTHER

## 2022-02-01 RX ORDER — OXYBUTYNIN CHLORIDE 10 MG/1
20 TABLET, EXTENDED RELEASE ORAL DAILY
Qty: 180 TABLET | Refills: 1 | Status: SHIPPED | OUTPATIENT
Start: 2022-02-01 | End: 2022-08-11 | Stop reason: SDUPTHER

## 2022-02-01 NOTE — PROGRESS NOTES
SUBJECTIVE:    Patient ID: Martha Miguel is a 61 y.o. female.    Chief Complaint: Diabetes (HTN, brought bottles, Eye exam [facility] [place]// )    Patient with hypertension and type 2 diabetes presents for visit.  Blood pressure is well controlled.  Recent labs are performed and are all in acceptable range including lipid panel and metabolic panel.  Thyroid is also normal.  She reports no issues with her medications.  She is up-to-date with immunizations including completing her COVID-19 vaccination series plus booster.  Eye exam is done and results have been requested.  She is up-to-date with colon cancer screening as well.  She continues to have some problems with overactive bladder but the use of Detrol does help.  She continues to work on physical activity and weight loss.  Weight has been stable over the past year and she notes improvement in maintaining her activities of daily living since she has been more active.    Has some neuropathy secondary to diabetes and lumbar stenosis.  She had a recent foot infection from stepping on a tack that had been her foot for approximately a full day before noticing.  She was seen in urgent care and placed on antibiotics.  Symptoms have resolved.   She is followed by Cardiology for history of aortic aneurysm.  Goal is to keep blood pressure control.  Continues to wear support socks and compression devices to control lymphedema.       Lab Visit on 01/19/2022   Component Date Value Ref Range Status    SARS-CoV2 (COVID-19) Qualitative P* 01/19/2022 Not Detected  Not Detected Final    SARS-COV-2- Cycle Number 01/19/2022 N/A   Final   Office Visit on 08/03/2021   Component Date Value Ref Range Status    Cholesterol 01/24/2022 141  <200 mg/dL Final    HDL 01/24/2022 50  > OR = 50 mg/dL Final    Triglycerides 01/24/2022 116  <150 mg/dL Final    LDL Cholesterol 01/24/2022 71  mg/dL (calc) Final    HDL/Cholesterol Ratio 01/24/2022 2.8  <5.0 (calc) Final    Non HDL  Chol. (LDL+VLDL) 01/24/2022 91  <130 mg/dL (calc) Final    Creatinine, Urine 01/24/2022 146  20 - 275 mg/dL Final    Microalb, Ur 01/24/2022 0.3  See Note: mg/dL Final    Microalb/Creat Ratio 01/24/2022 2  <30 mcg/mg creat Final    Hemoglobin A1C 01/24/2022 6.7* <5.7 % of total Hgb Final    Glucose 01/24/2022 102* 65 - 99 mg/dL Final    BUN 01/24/2022 17  7 - 25 mg/dL Final    Creatinine 01/24/2022 0.83  0.50 - 0.99 mg/dL Final    eGFR if non  01/24/2022 76  > OR = 60 mL/min/1.73m2 Final    eGFR if  01/24/2022 88  > OR = 60 mL/min/1.73m2 Final    BUN/Creatinine Ratio 01/24/2022 NOT APPLICABLE  6 - 22 (calc) Final    Sodium 01/24/2022 141  135 - 146 mmol/L Final    Potassium 01/24/2022 4.5  3.5 - 5.3 mmol/L Final    Chloride 01/24/2022 102  98 - 110 mmol/L Final    CO2 01/24/2022 29  20 - 32 mmol/L Final    Calcium 01/24/2022 9.9  8.6 - 10.4 mg/dL Final    Total Protein 01/24/2022 7.3  6.1 - 8.1 g/dL Final    Albumin 01/24/2022 4.3  3.6 - 5.1 g/dL Final    Globulin, Total 01/24/2022 3.0  1.9 - 3.7 g/dL (calc) Final    Albumin/Globulin Ratio 01/24/2022 1.4  1.0 - 2.5 (calc) Final    Total Bilirubin 01/24/2022 0.4  0.2 - 1.2 mg/dL Final    Alkaline Phosphatase 01/24/2022 61  37 - 153 U/L Final    AST 01/24/2022 10  10 - 35 U/L Final    ALT 01/24/2022 11  6 - 29 U/L Final    TSH w/reflex to FT4 01/24/2022 1.21  0.40 - 4.50 mIU/L Final    Color, UA 01/24/2022 YELLOW  YELLOW Final    Appearance, UA 01/24/2022 CLEAR  CLEAR Final    Specific [place], UA 01/24/2022 1.024  1.001 - 1.035 Final    pH, UA 01/24/2022 < OR = 5.0  5.0 - 8.0 Final    Glucose, UA 01/24/2022 NEGATIVE  NEGATIVE Final    Bilirubin, UA 01/24/2022 NEGATIVE  NEGATIVE Final    Ketones, UA 01/24/2022 NEGATIVE  NEGATIVE Final    Occult Blood UA 01/24/2022 NEGATIVE  NEGATIVE Final    Protein, UA 01/24/2022 NEGATIVE  NEGATIVE Final    Nitrite, UA 01/24/2022 NEGATIVE  NEGATIVE Final     Leukocytes, UA 01/24/2022 NEGATIVE  NEGATIVE Final    WBC Casts, UA 01/24/2022 NONE SEEN  < OR = 5 /HPF Final    RBC Casts, UA 01/24/2022 NONE SEEN  < OR = 2 /HPF Final    Squam Epithel, UA 01/24/2022 0-5  < OR = 5 /HPF Final    Bacteria, UA 01/24/2022 FEW* NONE SEEN /HPF Final    Hyaline Casts, UA 01/24/2022 NONE SEEN  NONE SEEN /LPF Final    Reflexive Urine Culture 01/24/2022    Final       Past Medical History:   Diagnosis Date    Abnormal heart rhythm     Allergy     Arthritis     COPD (chronic obstructive pulmonary disease)     Coronary artery disease     Depression     Diabetes mellitus, type 2     Essential hypertension 8/16/2016    Right sided sciatica 4/23/2018     Past Surgical History:   Procedure Laterality Date    CHOLECYSTECTOMY      FRACTURE SURGERY      HYSTERECTOMY      INJECTION OF ANESTHETIC AGENT AROUND MEDIAL BRANCH NERVES INNERVATING LUMBAR FACET JOINT Bilateral 7/5/2018    Procedure: Block-nerve-medial branch-lumbar;  Surgeon: Arthur Orellana MD;  Location: [facility] OR;  Service: Pain Management;  Laterality: Bilateral;  L2, 3, 4, 5    TONSILLECTOMY       Family History   Family history unknown: Yes       Marital Status:   Alcohol History:  reports no history of alcohol use.  Tobacco History:  reports that she has never smoked. She has never used smokeless tobacco.  Drug History:  reports no history of drug use.    Review of patient's allergies indicates:   Allergen Reactions    Phenytoin Hives    Dilantin [phenytoin sodium extended] Rash    Lovenox [enoxaparin] Rash and Hives       Current Outpatient Medications:     aspirin (ECOTRIN) 81 MG EC tablet, Take 81 mg by mouth once daily., Disp: , Rfl:     cyclobenzaprine (FLEXERIL) 10 MG tablet, Take 1 tablet (10 mg total) by mouth nightly as needed., Disp: 90 tablet, Rfl: 1    furosemide (LASIX) 20 MG tablet, Take 1 tablet (20 mg total) by mouth every morning., Disp: 90 tablet, Rfl: 1    potassium chloride SA  "(K-DUR,KLOR-CON) 10 MEQ tablet, Take 1 tablet (10 mEq total) by mouth once daily., Disp: 90 tablet, Rfl: 1    atorvastatin (LIPITOR) 10 MG tablet, Take 1 tablet (10 mg total) by mouth once daily., Disp: 90 tablet, Rfl: 1    carvediloL (COREG) 3.125 MG tablet, Take 1 tablet (3.125 mg total) by mouth 2 (two) times daily with meals., Disp: 180 tablet, Rfl: 1    lisinopriL (PRINIVIL,ZESTRIL) 2.5 MG tablet, Take 1 tablet (2.5 mg total) by mouth once daily., Disp: 90 tablet, Rfl: 1    metFORMIN (GLUCOPHAGE-XR) 750 MG ER 24hr tablet, Take 1 tablet (750 mg total) by mouth 2 (two) times daily before meals., Disp: 180 tablet, Rfl: 1    oxybutynin (DITROPAN-XL) 10 MG 24 hr tablet, Take 2 tablets (20 mg total) by mouth once daily., Disp: 180 tablet, Rfl: 1    Review of Systems   Constitutional: Negative for activity change, fatigue and unexpected weight change.   HENT: Negative for hearing loss, postnasal drip, sinus pressure, sore throat and voice change.    Eyes: Negative for photophobia and visual disturbance.   Respiratory: Negative for cough, shortness of breath and wheezing.    Cardiovascular: Positive for leg swelling. Negative for chest pain and palpitations.   Gastrointestinal: Negative for constipation, diarrhea and nausea.   Genitourinary: Negative for difficulty urinating, frequency, hematuria and urgency.   Musculoskeletal: Positive for back pain. Negative for arthralgias.   Skin: Negative for rash.   Neurological: Negative for weakness, light-headedness and headaches.   Hematological: Negative for adenopathy. Does not bruise/bleed easily.   Psychiatric/Behavioral: The patient is not nervous/anxious.           Objective:      Vitals:    02/01/22 0910   BP: 130/66   Pulse: 68   Weight: (!) 154.7 kg (341 lb)   Height: 5' 7" (1.702 m)     Physical Exam  Vitals reviewed.   Constitutional:       General: She is not in acute distress.     Appearance: Normal appearance. She is well-developed. She is morbidly obese. "   HENT:      Head: Normocephalic and atraumatic.      Right Ear: External ear normal.      Left Ear: External ear normal.      Nose: Nose normal.      Mouth/Throat:      Mouth: Mucous membranes are moist.   Eyes:      General: Lids are normal.      Conjunctiva/sclera: Conjunctivae normal.      Pupils: Pupils are equal, round, and reactive to light.   Neck:      Thyroid: No thyromegaly.      Vascular: No JVD.      Trachea: No tracheal deviation.   Cardiovascular:      Rate and Rhythm: Normal rate and regular rhythm.      Chest Wall: PMI is not displaced.      Pulses: Normal pulses.      Heart sounds: Normal heart sounds.      Comments: Trace edema in BLE  Pulmonary:      Effort: Pulmonary effort is normal.      Breath sounds: Normal breath sounds.   Abdominal:      General: Bowel sounds are normal.      Palpations: Abdomen is soft.      Tenderness: There is no abdominal tenderness. There is no guarding or rebound.   Musculoskeletal:         General: No tenderness. Normal range of motion.      Cervical back: Full passive range of motion without pain and neck supple.   Skin:     General: Skin is warm and dry.      Findings: No rash.   Neurological:      General: No focal deficit present.      Mental Status: She is alert and oriented to person, place, and time.   Psychiatric:         Mood and Affect: Mood normal.         Behavior: Behavior normal.           Assessment:       1. Essential hypertension    2. Type 2 diabetes mellitus without complication, without long-term current use of insulin    3. Thoracic aortic aneurysm without rupture    4. Mixed stress and urge urinary incontinence    5. Lymphedema of both lower extremities    6. Morbid obesity    7. Inflammatory spondylopathy of lumbar region         Plan:       Essential hypertension  -     carvediloL (COREG) 3.125 MG tablet; Take 1 tablet (3.125 mg total) by mouth 2 (two) times daily with meals.  Dispense: 180 tablet; Refill: 1    Type 2 diabetes mellitus  without complication, without long-term current use of insulin  -     atorvastatin (LIPITOR) 10 MG tablet; Take 1 tablet (10 mg total) by mouth once daily.  Dispense: 90 tablet; Refill: 1  -     metFORMIN (GLUCOPHAGE-XR) 750 MG ER 24hr tablet; Take 1 tablet (750 mg total) by mouth 2 (two) times daily before meals.  Dispense: 180 tablet; Refill: 1  -     lisinopriL (PRINIVIL,ZESTRIL) 2.5 MG tablet; Take 1 tablet (2.5 mg total) by mouth once daily.  Dispense: 90 tablet; Refill: 1    Thoracic aortic aneurysm without rupture  -     carvediloL (COREG) 3.125 MG tablet; Take 1 tablet (3.125 mg total) by mouth 2 (two) times daily with meals.  Dispense: 180 tablet; Refill: 1    Mixed stress and urge urinary incontinence  -     oxybutynin (DITROPAN-XL) 10 MG 24 hr tablet; Take 2 tablets (20 mg total) by mouth once daily.  Dispense: 180 tablet; Refill: 1    Lymphedema of both lower extremities  P.r.n. diuretic use    Morbid obesity  Continue to work on physical activity    Inflammatory spondylopathy of lumbar region  Stable    Follow up in about 6 months (around 8/1/2022) for HTN, Diabetic Check-Up.

## 2022-03-07 ENCOUNTER — TELEPHONE (OUTPATIENT)
Dept: FAMILY MEDICINE | Facility: CLINIC | Age: 62
End: 2022-03-07
Payer: MEDICARE

## 2022-03-07 NOTE — TELEPHONE ENCOUNTER
----- Message from Jessy Newell MA sent at 3/7/2022 11:40 AM CST -----    ----- Message -----  From: Atiya Holguin  Sent: 3/7/2022   8:52 AM CST  To: Katya Hummel Staff    MIS ramirez

## 2022-03-24 ENCOUNTER — TELEPHONE (OUTPATIENT)
Dept: FAMILY MEDICINE | Facility: CLINIC | Age: 62
End: 2022-03-24
Payer: MEDICARE

## 2022-03-24 NOTE — TELEPHONE ENCOUNTER
Spoke with patient, states she is requesting an excuse from jury duty.  Patient states she has testing to complete for aneurysm as well as an office visit which will interfere with jury duty.  States she also has diabetes and sleep apnea.  To md for review -LILIANA POSADA

## 2022-03-24 NOTE — TELEPHONE ENCOUNTER
----- Message from Milly Ramos sent at 3/24/2022  2:27 PM CDT -----  - pt would like to talk to nurse   [phone]

## 2022-03-25 NOTE — TELEPHONE ENCOUNTER
Per Dr Hummel:  DM and ZABRINA do not qualify.  Get excuse from Physician that you have visit with day of jury duty.      Spoke with patient notified of message/instructions per dr hummel.  Patient verbalized understanding -DN

## 2022-08-01 DIAGNOSIS — E11.9 TYPE 2 DIABETES MELLITUS WITHOUT COMPLICATION, WITHOUT LONG-TERM CURRENT USE OF INSULIN: ICD-10-CM

## 2022-08-01 RX ORDER — METFORMIN HYDROCHLORIDE 750 MG/1
750 TABLET, EXTENDED RELEASE ORAL
Qty: 180 TABLET | Refills: 1 | Status: SHIPPED | OUTPATIENT
Start: 2022-08-01 | End: 2023-02-14

## 2022-08-01 NOTE — TELEPHONE ENCOUNTER
----- Message from Sirena Everett sent at 7/29/2022 12:38 PM CDT -----  Obdulio Miguel called from [facility] this patient needs a refill on her Metformin [facility] # [phone] GH

## 2022-08-02 NOTE — TELEPHONE ENCOUNTER
The patient's prescription has been approved and sent to   [id number] [address] [place] - [address]  [address] [facility]  [place] [address]  Phone: [phone] Fax: [phone]

## 2022-08-08 ENCOUNTER — IMMUNIZATION (OUTPATIENT)
Dept: PRIMARY CARE CLINIC | Facility: CLINIC | Age: 62
End: 2022-08-08
Payer: MEDICARE

## 2022-08-08 ENCOUNTER — TELEPHONE (OUTPATIENT)
Dept: FAMILY MEDICINE | Facility: CLINIC | Age: 62
End: 2022-08-08

## 2022-08-08 DIAGNOSIS — I10 ESSENTIAL HYPERTENSION: ICD-10-CM

## 2022-08-08 DIAGNOSIS — E11.9 TYPE 2 DIABETES MELLITUS WITHOUT COMPLICATION, WITHOUT LONG-TERM CURRENT USE OF INSULIN: Primary | ICD-10-CM

## 2022-08-08 DIAGNOSIS — Z23 NEED FOR VACCINATION: Primary | ICD-10-CM

## 2022-08-08 PROCEDURE — 91306 COVID-19, MRNA, LNP-S, PF, 100 MCG/0.25 ML DOSE VACCINE (MODERNA BOOSTER): CPT | Mod: S$GLB,,, | Performed by: FAMILY MEDICINE

## 2022-08-08 PROCEDURE — 0064A COVID-19, MRNA, LNP-S, PF, 100 MCG/0.25 ML DOSE VACCINE (MODERNA BOOSTER): CPT | Mod: S$GLB,,, | Performed by: FAMILY MEDICINE

## 2022-08-08 PROCEDURE — 91306 COVID-19, MRNA, LNP-S, PF, 100 MCG/0.25 ML DOSE VACCINE (MODERNA BOOSTER): ICD-10-PCS | Mod: S$GLB,,, | Performed by: FAMILY MEDICINE

## 2022-08-08 PROCEDURE — 0064A COVID-19, MRNA, LNP-S, PF, 100 MCG/0.25 ML DOSE VACCINE (MODERNA BOOSTER): ICD-10-PCS | Mod: S$GLB,,, | Performed by: FAMILY MEDICINE

## 2022-08-09 LAB
ALBUMIN SERPL-MCNC: 4 G/DL (ref 3.6–5.1)
ALBUMIN/GLOB SERPL: 1.5 (CALC) (ref 1–2.5)
ALP SERPL-CCNC: 61 U/L (ref 37–153)
ALT SERPL-CCNC: 12 U/L (ref 6–29)
AST SERPL-CCNC: 11 U/L (ref 10–35)
BASOPHILS # BLD AUTO: 39 CELLS/UL (ref 0–200)
BASOPHILS NFR BLD AUTO: 0.7 %
BILIRUB SERPL-MCNC: 0.5 MG/DL (ref 0.2–1.2)
BUN SERPL-MCNC: 13 MG/DL (ref 7–25)
BUN/CREAT SERPL: ABNORMAL (CALC) (ref 6–22)
CALCIUM SERPL-MCNC: 9.3 MG/DL (ref 8.6–10.4)
CHLORIDE SERPL-SCNC: 106 MMOL/L (ref 98–110)
CHOLEST SERPL-MCNC: 139 MG/DL
CHOLEST/HDLC SERPL: 2.8 (CALC)
CO2 SERPL-SCNC: 26 MMOL/L (ref 20–32)
CREAT SERPL-MCNC: 0.88 MG/DL (ref 0.5–1.05)
EGFR: 74 ML/MIN/1.73M2
EOSINOPHIL # BLD AUTO: 149 CELLS/UL (ref 15–500)
EOSINOPHIL NFR BLD AUTO: 2.7 %
ERYTHROCYTE [DISTWIDTH] IN BLOOD BY AUTOMATED COUNT: 12.7 % (ref 11–15)
GLOBULIN SER CALC-MCNC: 2.6 G/DL (CALC) (ref 1.9–3.7)
GLUCOSE SERPL-MCNC: 103 MG/DL (ref 65–99)
HBA1C MFR BLD: 6.6 % OF TOTAL HGB
HCT VFR BLD AUTO: 41 % (ref 35–45)
HDLC SERPL-MCNC: 49 MG/DL
HGB BLD-MCNC: 13.4 G/DL (ref 11.7–15.5)
LDLC SERPL CALC-MCNC: 70 MG/DL (CALC)
LYMPHOCYTES # BLD AUTO: 2316 CELLS/UL (ref 850–3900)
LYMPHOCYTES NFR BLD AUTO: 42.1 %
MCH RBC QN AUTO: 30.6 PG (ref 27–33)
MCHC RBC AUTO-ENTMCNC: 32.7 G/DL (ref 32–36)
MCV RBC AUTO: 93.6 FL (ref 80–100)
MONOCYTES # BLD AUTO: 633 CELLS/UL (ref 200–950)
MONOCYTES NFR BLD AUTO: 11.5 %
NEUTROPHILS # BLD AUTO: 2365 CELLS/UL (ref 1500–7800)
NEUTROPHILS NFR BLD AUTO: 43 %
NONHDLC SERPL-MCNC: 90 MG/DL (CALC)
PLATELET # BLD AUTO: 269 THOUSAND/UL (ref 140–400)
PMV BLD REES-ECKER: 10.8 FL (ref 7.5–12.5)
POTASSIUM SERPL-SCNC: 4.5 MMOL/L (ref 3.5–5.3)
PROT SERPL-MCNC: 6.6 G/DL (ref 6.1–8.1)
RBC # BLD AUTO: 4.38 MILLION/UL (ref 3.8–5.1)
SODIUM SERPL-SCNC: 141 MMOL/L (ref 135–146)
TRIGL SERPL-MCNC: 118 MG/DL
TSH SERPL-ACNC: 3.03 MIU/L (ref 0.4–4.5)
WBC # BLD AUTO: 5.5 THOUSAND/UL (ref 3.8–10.8)

## 2022-08-10 ENCOUNTER — TELEPHONE (OUTPATIENT)
Dept: FAMILY MEDICINE | Facility: CLINIC | Age: 62
End: 2022-08-10

## 2022-08-10 NOTE — TELEPHONE ENCOUNTER
----- Message from Milly Ramos sent at 8/10/2022  1:35 PM CDT -----  Vm- pt would like to talk to nurse about her blood pressure   [phone]

## 2022-08-10 NOTE — TELEPHONE ENCOUNTER
Spoke with patient states recent visit to the ER.  States she was not given her medications correctly while there.  Her BP has not been normal since discharge.  Would like to come in to discuss.  Visit scheduled for 8/11/22 -LILIANA

## 2022-08-11 ENCOUNTER — OFFICE VISIT (OUTPATIENT)
Dept: FAMILY MEDICINE | Facility: CLINIC | Age: 62
End: 2022-08-11
Payer: MEDICARE

## 2022-08-11 VITALS
DIASTOLIC BLOOD PRESSURE: 78 MMHG | OXYGEN SATURATION: 98 % | SYSTOLIC BLOOD PRESSURE: 138 MMHG | HEART RATE: 72 BPM | WEIGHT: 293 LBS | BODY MASS INDEX: 45.99 KG/M2 | HEIGHT: 67 IN

## 2022-08-11 DIAGNOSIS — E11.9 TYPE 2 DIABETES MELLITUS WITHOUT COMPLICATION, WITHOUT LONG-TERM CURRENT USE OF INSULIN: ICD-10-CM

## 2022-08-11 DIAGNOSIS — I71.23 ANEURYSM OF DESCENDING THORACIC AORTA WITHOUT RUPTURE: ICD-10-CM

## 2022-08-11 DIAGNOSIS — Z12.31 ENCOUNTER FOR SCREENING MAMMOGRAM FOR MALIGNANT NEOPLASM OF BREAST: ICD-10-CM

## 2022-08-11 DIAGNOSIS — N39.46 MIXED STRESS AND URGE URINARY INCONTINENCE: ICD-10-CM

## 2022-08-11 DIAGNOSIS — I10 ESSENTIAL HYPERTENSION: Primary | ICD-10-CM

## 2022-08-11 PROCEDURE — 4010F ACE/ARB THERAPY RXD/TAKEN: CPT | Mod: CPTII,S$GLB,, | Performed by: FAMILY MEDICINE

## 2022-08-11 PROCEDURE — 3008F BODY MASS INDEX DOCD: CPT | Mod: CPTII,S$GLB,, | Performed by: FAMILY MEDICINE

## 2022-08-11 PROCEDURE — 3066F PR DOCUMENTATION OF TREATMENT FOR NEPHROPATHY: ICD-10-PCS | Mod: CPTII,S$GLB,, | Performed by: FAMILY MEDICINE

## 2022-08-11 PROCEDURE — 3044F PR MOST RECENT HEMOGLOBIN A1C LEVEL <7.0%: ICD-10-PCS | Mod: CPTII,S$GLB,, | Performed by: FAMILY MEDICINE

## 2022-08-11 PROCEDURE — 3078F DIAST BP <80 MM HG: CPT | Mod: CPTII,S$GLB,, | Performed by: FAMILY MEDICINE

## 2022-08-11 PROCEDURE — 3075F PR MOST RECENT SYSTOLIC BLOOD PRESS GE 130-139MM HG: ICD-10-PCS | Mod: CPTII,S$GLB,, | Performed by: FAMILY MEDICINE

## 2022-08-11 PROCEDURE — 99214 OFFICE O/P EST MOD 30 MIN: CPT | Mod: S$GLB,,, | Performed by: FAMILY MEDICINE

## 2022-08-11 PROCEDURE — 3044F HG A1C LEVEL LT 7.0%: CPT | Mod: CPTII,S$GLB,, | Performed by: FAMILY MEDICINE

## 2022-08-11 PROCEDURE — 4010F PR ACE/ARB THEARPY RXD/TAKEN: ICD-10-PCS | Mod: CPTII,S$GLB,, | Performed by: FAMILY MEDICINE

## 2022-08-11 PROCEDURE — 3008F PR BODY MASS INDEX (BMI) DOCUMENTED: ICD-10-PCS | Mod: CPTII,S$GLB,, | Performed by: FAMILY MEDICINE

## 2022-08-11 PROCEDURE — 3061F PR NEG MICROALBUMINURIA RESULT DOCUMENTED/REVIEW: ICD-10-PCS | Mod: CPTII,S$GLB,, | Performed by: FAMILY MEDICINE

## 2022-08-11 PROCEDURE — 3078F PR MOST RECENT DIASTOLIC BLOOD PRESSURE < 80 MM HG: ICD-10-PCS | Mod: CPTII,S$GLB,, | Performed by: FAMILY MEDICINE

## 2022-08-11 PROCEDURE — 3066F NEPHROPATHY DOC TX: CPT | Mod: CPTII,S$GLB,, | Performed by: FAMILY MEDICINE

## 2022-08-11 PROCEDURE — 99214 PR OFFICE/OUTPT VISIT, EST, LEVL IV, 30-39 MIN: ICD-10-PCS | Mod: S$GLB,,, | Performed by: FAMILY MEDICINE

## 2022-08-11 PROCEDURE — 3061F NEG MICROALBUMINURIA REV: CPT | Mod: CPTII,S$GLB,, | Performed by: FAMILY MEDICINE

## 2022-08-11 PROCEDURE — 3075F SYST BP GE 130 - 139MM HG: CPT | Mod: CPTII,S$GLB,, | Performed by: FAMILY MEDICINE

## 2022-08-11 RX ORDER — ATORVASTATIN CALCIUM 10 MG/1
10 TABLET, FILM COATED ORAL DAILY
Qty: 90 TABLET | Refills: 1 | Status: SHIPPED | OUTPATIENT
Start: 2022-08-11 | End: 2023-02-14 | Stop reason: SDUPTHER

## 2022-08-11 RX ORDER — LISINOPRIL 2.5 MG/1
2.5 TABLET ORAL DAILY
Qty: 90 TABLET | Refills: 1 | Status: SHIPPED | OUTPATIENT
Start: 2022-08-11 | End: 2023-06-06 | Stop reason: SDUPTHER

## 2022-08-11 RX ORDER — OXYBUTYNIN CHLORIDE 10 MG/1
20 TABLET, EXTENDED RELEASE ORAL DAILY
Qty: 180 TABLET | Refills: 1 | Status: SHIPPED | OUTPATIENT
Start: 2022-08-11 | End: 2023-02-14 | Stop reason: ALTCHOICE

## 2022-08-11 RX ORDER — CARVEDILOL 3.12 MG/1
3.12 TABLET ORAL 2 TIMES DAILY WITH MEALS
Qty: 180 TABLET | Refills: 1 | Status: SHIPPED | OUTPATIENT
Start: 2022-08-11 | End: 2023-06-06 | Stop reason: SDUPTHER

## 2022-08-11 NOTE — PROGRESS NOTES
SUBJECTIVE:    Patient ID: Martha Miguel is a 61 y.o. female.    Chief Complaint: Follow-up (BP concerns, went over meds verbally// SW)    Patient with DM and CAD in for visit. Had episode of CP  8/8/22 and went to [facility]. Was kept for observation and had a negative stress test. She reports she will see cardiology next week .  She had some elevated blood pressures and sugars due to them holding her medications. However she is doing better now.   Her usual screening labs were performed and are all in good range. Blood pressure well controlled . !3lb weight loss since last visit 6 months ago   Eye exam is due and she plans on scheduling  Has had the COVID-19 vaccination and recent pneumonia vaccination during her hospital admission.          Telephone on 08/08/2022   Component Date Value Ref Range Status    WBC 08/08/2022 5.5  3.8 - 10.8 Thousand/uL Final    RBC 08/08/2022 4.38  3.80 - 5.10 Million/uL Final    Hemoglobin 08/08/2022 13.4  11.7 - 15.5 g/dL Final    Hematocrit 08/08/2022 41.0  35.0 - 45.0 % Final    MCV 08/08/2022 93.6  80.0 - 100.0 fL Final    MCH 08/08/2022 30.6  27.0 - 33.0 pg Final    MCHC 08/08/2022 32.7  32.0 - 36.0 g/dL Final    RDW 08/08/2022 12.7  11.0 - 15.0 % Final    Platelets 08/08/2022 269  140 - 400 Thousand/uL Final    MPV 08/08/2022 10.8  7.5 - 12.5 fL Final    Neutrophils, Abs 08/08/2022 2,365  1,500 - 7,800 cells/uL Final    Lymph # 08/08/2022 2,316  850 - 3,900 cells/uL Final    Mono # 08/08/2022 633  200 - 950 cells/uL Final    Eos # 08/08/2022 149  15 - 500 cells/uL Final    Baso # 08/08/2022 39  0 - 200 cells/uL Final    Neutrophils Relative 08/08/2022 43  % Final    Lymph % 08/08/2022 42.1  % Final    Mono % 08/08/2022 11.5  % Final    Eosinophil % 08/08/2022 2.7  % Final    Basophil % 08/08/2022 0.7  % Final    Glucose 08/08/2022 103 (A) 65 - 99 mg/dL Final    BUN 08/08/2022 13  7 - 25 mg/dL Final    Creatinine 08/08/2022 0.88  0.50 - 1.05 mg/dL Final    EGFR  08/08/2022 74  > OR = 60 mL/min/1.73m2 Final    BUN/Creatinine Ratio 08/08/2022 NOT APPLICABLE  6 - 22 (calc) Final    Sodium 08/08/2022 141  135 - 146 mmol/L Final    Potassium 08/08/2022 4.5  3.5 - 5.3 mmol/L Final    Chloride 08/08/2022 106  98 - 110 mmol/L Final    CO2 08/08/2022 26  20 - 32 mmol/L Final    Calcium 08/08/2022 9.3  8.6 - 10.4 mg/dL Final    Total Protein 08/08/2022 6.6  6.1 - 8.1 g/dL Final    Albumin 08/08/2022 4.0  3.6 - 5.1 g/dL Final    Globulin, Total 08/08/2022 2.6  1.9 - 3.7 g/dL (calc) Final    Albumin/Globulin Ratio 08/08/2022 1.5  1.0 - 2.5 (calc) Final    Total Bilirubin 08/08/2022 0.5  0.2 - 1.2 mg/dL Final    Alkaline Phosphatase 08/08/2022 61  37 - 153 U/L Final    AST 08/08/2022 11  10 - 35 U/L Final    ALT 08/08/2022 12  6 - 29 U/L Final    Cholesterol 08/08/2022 139  <200 mg/dL Final    HDL 08/08/2022 49 (A) > OR = 50 mg/dL Final    Triglycerides 08/08/2022 118  <150 mg/dL Final    LDL Cholesterol 08/08/2022 70  mg/dL (calc) Final    HDL/Cholesterol Ratio 08/08/2022 2.8  <5.0 (calc) Final    Non HDL Chol. (LDL+VLDL) 08/08/2022 90  <130 mg/dL (calc) Final    TSH w/reflex to FT4 08/08/2022 3.03  0.40 - 4.50 mIU/L Final    Hemoglobin A1C 08/08/2022 6.6 (A) <5.7 % of total Hgb Final       Past Medical History:   Diagnosis Date    Abnormal heart rhythm     Allergy     Arthritis     COPD (chronic obstructive pulmonary disease)     Coronary artery disease     Depression     Diabetes mellitus, type 2     Essential hypertension 8/16/2016    Right sided sciatica 4/23/2018     Past Surgical History:   Procedure Laterality Date    CHOLECYSTECTOMY      FRACTURE SURGERY      HYSTERECTOMY      INJECTION OF ANESTHETIC AGENT AROUND MEDIAL BRANCH NERVES INNERVATING LUMBAR FACET JOINT Bilateral 7/5/2018    Procedure: Block-nerve-medial branch-lumbar;  Surgeon: Arthur Orellana MD;  Location: [facility];  Service: Pain Management;  Laterality: Bilateral;  L2, 3, 4, 5    TONSILLECTOMY       Family History    Family history unknown: Yes       Marital Status:   Alcohol History:  reports no history of alcohol use.  Tobacco History:  reports that she has never smoked. She has never used smokeless tobacco.  Drug History:  reports no history of drug use.    Review of patient's allergies indicates:   Allergen Reactions    Phenytoin Hives    Dilantin [phenytoin sodium extended] Rash    Lovenox [enoxaparin] Rash and Hives       Current Outpatient Medications:     aspirin (ECOTRIN) 81 MG EC tablet, Take 81 mg by mouth once daily., Disp: , Rfl:     furosemide (LASIX) 20 MG tablet, Take 1 tablet (20 mg total) by mouth every morning., Disp: 90 tablet, Rfl: 1    metFORMIN (GLUCOPHAGE-XR) 750 MG ER 24hr tablet, Take 1 tablet (750 mg total) by mouth 2 (two) times daily before meals., Disp: 180 tablet, Rfl: 1    potassium chloride SA (K-DUR,KLOR-CON) 10 MEQ tablet, Take 1 tablet (10 mEq total) by mouth once daily., Disp: 90 tablet, Rfl: 1    atorvastatin (LIPITOR) 10 MG tablet, Take 1 tablet (10 mg total) by mouth once daily., Disp: 90 tablet, Rfl: 1    carvediloL (COREG) 3.125 MG tablet, Take 1 tablet (3.125 mg total) by mouth 2 (two) times daily with meals., Disp: 180 tablet, Rfl: 1    lisinopriL (PRINIVIL,ZESTRIL) 2.5 MG tablet, Take 1 tablet (2.5 mg total) by mouth once daily., Disp: 90 tablet, Rfl: 1    oxybutynin (DITROPAN-XL) 10 MG 24 hr tablet, Take 2 tablets (20 mg total) by mouth once daily., Disp: 180 tablet, Rfl: 1    Review of Systems   Constitutional:  Negative for activity change, fatigue and unexpected weight change.   HENT:  Negative for hearing loss, postnasal drip, sinus pressure, sore throat and voice change.    Eyes:  Negative for photophobia and visual disturbance.   Respiratory:  Negative for cough, shortness of breath and wheezing.    Cardiovascular:  Negative for chest pain and palpitations.   Gastrointestinal:  Negative for constipation, diarrhea and nausea.   Genitourinary:  Negative for difficulty  "urinating, frequency, hematuria and urgency.   Musculoskeletal:  Negative for arthralgias and back pain.   Skin:  Negative for rash.   Neurological:  Negative for weakness, light-headedness and headaches.   Hematological:  Negative for adenopathy. Does not bruise/bleed easily.   Psychiatric/Behavioral:  The patient is not nervous/anxious.         Objective:      Vitals:    08/11/22 1409   BP: 138/78   Pulse: 72   SpO2: 98%   Weight: (!) 148.8 kg (328 lb)   Height: 5' 7" (1.702 m)     Physical Exam  Constitutional:       Appearance: Normal appearance. She is morbidly obese.   HENT:      Head: Normocephalic and atraumatic.      Mouth/Throat:      Mouth: Mucous membranes are moist.   Eyes:      Conjunctiva/sclera: Conjunctivae normal.   Pulmonary:      Effort: Pulmonary effort is normal.   Neurological:      General: No focal deficit present.      Mental Status: She is alert and oriented to person, place, and time.   Psychiatric:         Mood and Affect: Mood normal.         Behavior: Behavior normal.         Assessment:       1. Essential hypertension    2. Type 2 diabetes mellitus without complication, without long-term current use of insulin    3. Thoracic aortic aneurysm without rupture    4. Mixed stress and urge urinary incontinence    5. Encounter for screening mammogram for malignant neoplasm of breast         Plan:       Essential hypertension  -     carvediloL (COREG) 3.125 MG tablet; Take 1 tablet (3.125 mg total) by mouth 2 (two) times daily with meals.  Dispense: 180 tablet; Refill: 1    Type 2 diabetes mellitus without complication, without long-term current use of insulin  -     lisinopriL (PRINIVIL,ZESTRIL) 2.5 MG tablet; Take 1 tablet (2.5 mg total) by mouth once daily.  Dispense: 90 tablet; Refill: 1  -     atorvastatin (LIPITOR) 10 MG tablet; Take 1 tablet (10 mg total) by mouth once daily.  Dispense: 90 tablet; Refill: 1    Thoracic aortic aneurysm without rupture  -     carvediloL (COREG) 3.125 MG " tablet; Take 1 tablet (3.125 mg total) by mouth 2 (two) times daily with meals.  Dispense: 180 tablet; Refill: 1    Mixed stress and urge urinary incontinence  -     oxybutynin (DITROPAN-XL) 10 MG 24 hr tablet; Take 2 tablets (20 mg total) by mouth once daily.  Dispense: 180 tablet; Refill: 1    Encounter for screening mammogram for malignant neoplasm of breast  -     Mammo Digital Screening Bilat w/ Aj; Future; Expected date: 08/23/2022      Follow up in about 6 months (around 2/11/2023) for Annual Physical.

## 2022-09-14 ENCOUNTER — HOSPITAL ENCOUNTER (OUTPATIENT)
Dept: RADIOLOGY | Facility: HOSPITAL | Age: 62
Discharge: HOME | End: 2022-09-14
Attending: FAMILY MEDICINE
Payer: MEDICARE

## 2022-09-14 DIAGNOSIS — Z12.31 ENCOUNTER FOR SCREENING MAMMOGRAM FOR MALIGNANT NEOPLASM OF BREAST: ICD-10-CM

## 2022-09-14 PROCEDURE — 77063 BREAST TOMOSYNTHESIS BI: CPT | Mod: TC,PO

## 2022-09-14 PROCEDURE — 77067 SCR MAMMO BI INCL CAD: CPT | Mod: TC,PO

## 2022-09-27 ENCOUNTER — OFFICE VISIT (OUTPATIENT)
Dept: PODIATRY | Facility: CLINIC | Age: 62
End: 2022-09-27
Payer: MEDICARE

## 2022-09-27 ENCOUNTER — HOSPITAL ENCOUNTER (OUTPATIENT)
Dept: RADIOLOGY | Facility: CLINIC | Age: 62
Discharge: HOME | End: 2022-09-27
Attending: PODIATRIST
Payer: MEDICARE

## 2022-09-27 VITALS — HEART RATE: 63 BPM | BODY MASS INDEX: 44.41 KG/M2 | HEIGHT: 68 IN | WEIGHT: 293 LBS | OXYGEN SATURATION: 98 %

## 2022-09-27 DIAGNOSIS — M72.2 PLANTAR FASCIITIS: Primary | ICD-10-CM

## 2022-09-27 DIAGNOSIS — I89.0 LYMPHEDEMA OF LEFT LOWER EXTREMITY: ICD-10-CM

## 2022-09-27 DIAGNOSIS — E11.9 TYPE 2 DIABETES MELLITUS WITHOUT COMPLICATION, WITHOUT LONG-TERM CURRENT USE OF INSULIN: ICD-10-CM

## 2022-09-27 DIAGNOSIS — E11.9 ENCOUNTER FOR DIABETIC FOOT EXAM: ICD-10-CM

## 2022-09-27 DIAGNOSIS — M79.671 PAIN IN BOTH FEET: ICD-10-CM

## 2022-09-27 DIAGNOSIS — M79.672 PAIN IN BOTH FEET: ICD-10-CM

## 2022-09-27 DIAGNOSIS — M19.172 POST-TRAUMATIC OSTEOARTHRITIS OF LEFT ANKLE: ICD-10-CM

## 2022-09-27 PROCEDURE — 3061F NEG MICROALBUMINURIA REV: CPT | Mod: CPTII,S$GLB,, | Performed by: PODIATRIST

## 2022-09-27 PROCEDURE — 1159F MED LIST DOCD IN RCRD: CPT | Mod: CPTII,S$GLB,, | Performed by: PODIATRIST

## 2022-09-27 PROCEDURE — 1160F RVW MEDS BY RX/DR IN RCRD: CPT | Mod: CPTII,S$GLB,, | Performed by: PODIATRIST

## 2022-09-27 PROCEDURE — 3008F BODY MASS INDEX DOCD: CPT | Mod: CPTII,S$GLB,, | Performed by: PODIATRIST

## 2022-09-27 PROCEDURE — 99203 OFFICE O/P NEW LOW 30 MIN: CPT | Mod: S$GLB,,, | Performed by: PODIATRIST

## 2022-09-27 PROCEDURE — 1160F PR REVIEW ALL MEDS BY PRESCRIBER/CLIN PHARMACIST DOCUMENTED: ICD-10-PCS | Mod: CPTII,S$GLB,, | Performed by: PODIATRIST

## 2022-09-27 PROCEDURE — 99203 PR OFFICE/OUTPT VISIT, NEW, LEVL III, 30-44 MIN: ICD-10-PCS | Mod: S$GLB,,, | Performed by: PODIATRIST

## 2022-09-27 PROCEDURE — 73630 XR FOOT COMPLETE 3 VIEW BILATERAL: ICD-10-PCS | Mod: 50,S$GLB,, | Performed by: RADIOLOGY

## 2022-09-27 PROCEDURE — 73630 X-RAY EXAM OF FOOT: CPT | Mod: 50,S$GLB,, | Performed by: RADIOLOGY

## 2022-09-27 PROCEDURE — 3044F PR MOST RECENT HEMOGLOBIN A1C LEVEL <7.0%: ICD-10-PCS | Mod: CPTII,S$GLB,, | Performed by: PODIATRIST

## 2022-09-27 PROCEDURE — 4010F ACE/ARB THERAPY RXD/TAKEN: CPT | Mod: CPTII,S$GLB,, | Performed by: PODIATRIST

## 2022-09-27 PROCEDURE — 3008F PR BODY MASS INDEX (BMI) DOCUMENTED: ICD-10-PCS | Mod: CPTII,S$GLB,, | Performed by: PODIATRIST

## 2022-09-27 PROCEDURE — 4010F PR ACE/ARB THEARPY RXD/TAKEN: ICD-10-PCS | Mod: CPTII,S$GLB,, | Performed by: PODIATRIST

## 2022-09-27 PROCEDURE — 3061F PR NEG MICROALBUMINURIA RESULT DOCUMENTED/REVIEW: ICD-10-PCS | Mod: CPTII,S$GLB,, | Performed by: PODIATRIST

## 2022-09-27 PROCEDURE — 3044F HG A1C LEVEL LT 7.0%: CPT | Mod: CPTII,S$GLB,, | Performed by: PODIATRIST

## 2022-09-27 PROCEDURE — 1159F PR MEDICATION LIST DOCUMENTED IN MEDICAL RECORD: ICD-10-PCS | Mod: CPTII,S$GLB,, | Performed by: PODIATRIST

## 2022-09-27 PROCEDURE — 3066F PR DOCUMENTATION OF TREATMENT FOR NEPHROPATHY: ICD-10-PCS | Mod: CPTII,S$GLB,, | Performed by: PODIATRIST

## 2022-09-27 PROCEDURE — 3066F NEPHROPATHY DOC TX: CPT | Mod: CPTII,S$GLB,, | Performed by: PODIATRIST

## 2022-09-27 RX ORDER — MELOXICAM 15 MG/1
15 TABLET ORAL DAILY
Qty: 30 TABLET | Refills: 2 | Status: SHIPPED | OUTPATIENT
Start: 2022-09-27 | End: 2022-12-26

## 2022-09-27 NOTE — PATIENT INSTRUCTIONS
Medication refill:    Script info:    Name from pharmacy: MONTELUKAST SODIUM TABS 10MG         Will file in chart as: montelukast (SINGULAIR) 10 MG tablet    Sig: TAKE 1 TABLET NIGHTLY    Disp:  90 tablet    Refills:  3    Start: 12/12/2021    Class: Eprescribe    Non-formulary For: Seasonal allergies    Last ordered: 6 months ago by EDDIE Mueller Last refill: 9/17/2021    Rx #: [id number]       To be filled at: [facility] [address]         Last office visit: 4/9/2021    Upcoming appt: 4/11/2022    Last lab related to medication: 0    BP?- 0    Refill outcome: Filled per protocol    Understanding Plantar Fasciitis    Plantar fasciitis is a condition that causes foot and heel pain. The plantar fascia is a tough band of tissue that runs across the bottom of the foot from the heel to the toes. This tissue pulls on the heel bone. It supports the arch of the foot as it pushes off the ground. If the tissue becomes irritated or red and swollen (inflamed), it is called plantar fasciitis.  How to say it  PLAN-tuhr fa-see-IY-tis     What causes plantar fasciitis?  Plantar fasciitis most often occurs from overusing the plantar fascia. The tissue may become damaged from activities that put repeated stress on the heel and foot. Or it may wear down over time with age and ankle stiffness. You are more likely to have plantar fasciitis if you:  Do activities that require a lot of running, jumping, or dancing  Have a job that requires being on your feet for long periods  Are overweight or obese  Have certain foot problems, such as a tight Achilles tendon, flat feet, or high arches  Often wear poorly fitting shoes    Symptoms of plantar fasciitis  The condition most often causes pain in the heel and the bottom of the foot. The pain may occur when you take your first steps in the morning. It may get better as you walk throughout the day. But as you continue to put weight on the foot, the pain often returns. Pain may also occur after standing or sitting for long periods.    Treating plantar fasciitis  Treatments for plantar fasciitis include:  Resting the foot. This involves limiting movements that make your foot hurt. You may also need to avoid certain sports and types of work for a time.  Using cold packs. Put an ice pack on the heel and foot to help reduce pain and swelling.  Taking pain medicines. Prescription and over-the-counter pain medicines can help relieve pain and swelling.  Using heel cups or foot inserts (orthotics). These are placed in the shoes to help support the heel or arch and cushion the heel.  You may also be told to buy proper-fitting shoes with good arch support and cushioned soles.  Taping the foot. This supports the arch and limits the movement of the plantar fascia to help relieve symptoms.  Wearing a night splint. This stretches the plantar fascia and leg muscles while you sleep. This may help relieve pain.  Doing exercises and physical therapy. These stretch and strengthen the plantar fascia and the muscles in the leg that support the heel and foot.  Getting shots of medicine into the foot. These may help relieve symptoms for a time.  Having surgery. This may be needed if other treatments fail to relieve symptoms. During surgery, the surgeon may partially cut the plantar fascia to release tension.    Possible complications of plantar fasciitis  Without proper care and treatment, healing may take longer than normal. Also, symptoms may continue or get worse. Over time, the plantar fascia may be damaged. This can make it hard to walk or even stand without pain.    When to call your healthcare provider  Call your healthcare provider right away if you have any of these:  Fever of 100.4°F (38°C) or higher, or as directed  Symptoms that dont get better with treatment, or get worse  New symptoms, such as numbness, tingling, or weakness in the foot     Date Last Reviewed: 3/10/2016  © 8253-5235 [employer] [facility]. [address] [place] [address], [place] [address]. All rights reserved. This information is not intended as a substitute for professional medical care. Always follow your healthcare professional's instructions.

## 2022-09-27 NOTE — PROGRESS NOTES
"  [address] [facility] [address]  [place] [address]  Phone: [phone]   [phone]    Patient's PCP:Katya Hummel MD  Referring Provider: Aaareferral Self    Subjective:      Chief Complaint:: No chief complaint on file.    KENNETH Miguel is a 62 y.o. female who presents today with a complaint of bilateral foot pain in the heel and toes lasting for about a year. Onset of symptoms walking with gradual onset and reports no trauma.  Current symptoms include . Aggravating factors are walking. Symptoms have worsen.They are worse in the morning and after rest and upon getting back up. Treatment to date have included soaking.    who presents to the clinic for a diabetic foot exam.   Pt has seen Katya Hummel MD on 08/11/2022 who treats them for their diabetes.  Pt has been a diabetic for about 15years.  Taking Metformin to treat diabetes.    Blood sugar: 112  Hemoglobin A1C: 6.6 /8/8/2022)         Vitals:    09/27/22 1044   Pulse: 63   SpO2: 98%   Weight: (!) 151 kg (333 lb)   Height: 5' 8" (1.727 m)   PainSc: 0-No pain      Shoe Size: 10    Past Surgical History:   Procedure Laterality Date    CHOLECYSTECTOMY      FRACTURE SURGERY      HYSTERECTOMY      INJECTION OF ANESTHETIC AGENT AROUND MEDIAL BRANCH NERVES INNERVATING LUMBAR FACET JOINT Bilateral 7/5/2018    Procedure: Block-nerve-medial branch-lumbar;  Surgeon: Arthur Orellana MD;  Location: [facility] OR;  Service: Pain Management;  Laterality: Bilateral;  L2, 3, 4, 5    TONSILLECTOMY       Past Medical History:   Diagnosis Date    Abnormal heart rhythm     Allergy     Arthritis     COPD (chronic obstructive pulmonary disease)     Coronary artery disease     Depression     Diabetes mellitus, type 2     Essential hypertension 8/16/2016    Right sided sciatica 4/23/2018     Family History   Family history unknown: Yes        Social History:   Marital Status:   Alcohol History:  reports no history of alcohol use.  Tobacco History:  reports that " she has never smoked. She has never used smokeless tobacco.  Drug History:  reports no history of drug use.    Review of patient's allergies indicates:   Allergen Reactions    Phenytoin Hives    Dilantin [phenytoin sodium extended] Rash    Lovenox [enoxaparin] Rash and Hives       Current Outpatient Medications   Medication Sig Dispense Refill    aspirin (ECOTRIN) 81 MG EC tablet Take 81 mg by mouth once daily.      atorvastatin (LIPITOR) 10 MG tablet Take 1 tablet (10 mg total) by mouth once daily. 90 tablet 1    carvediloL (COREG) 3.125 MG tablet Take 1 tablet (3.125 mg total) by mouth 2 (two) times daily with meals. 180 tablet 1    furosemide (LASIX) 20 MG tablet Take 1 tablet (20 mg total) by mouth every morning. 90 tablet 1    lisinopriL (PRINIVIL,ZESTRIL) 2.5 MG tablet Take 1 tablet (2.5 mg total) by mouth once daily. 90 tablet 1    metFORMIN (GLUCOPHAGE-XR) 750 MG ER 24hr tablet Take 1 tablet (750 mg total) by mouth 2 (two) times daily before meals. 180 tablet 1    oxybutynin (DITROPAN-XL) 10 MG 24 hr tablet Take 2 tablets (20 mg total) by mouth once daily. 180 tablet 1    potassium chloride SA (K-DUR,KLOR-CON) 10 MEQ tablet Take 1 tablet (10 mEq total) by mouth once daily. 90 tablet 1     No current facility-administered medications for this visit.       Review of Systems   Constitutional:  Negative for chills, fatigue, fever and unexpected weight change.   HENT:  Negative for hearing loss and trouble swallowing.    Eyes:  Negative for photophobia and visual disturbance.   Respiratory:  Negative for cough, shortness of breath and wheezing.    Cardiovascular:  Negative for chest pain, palpitations and leg swelling.   Gastrointestinal:  Negative for abdominal pain and nausea.   Genitourinary:  Negative for dysuria and frequency.   Musculoskeletal:  Negative for arthralgias, back pain, gait problem, joint swelling and myalgias.   Skin:  Negative for rash and wound.   Neurological:  Negative for seizures,  weakness, numbness and headaches.   Hematological:  Does not bruise/bleed easily.       Objective:        Physical Exam:   Foot Exam    General  General Appearance: appears stated age and healthy   Orientation: alert and oriented to person, place, and time   Affect: appropriate   Gait: antalgic       Right Foot/Ankle     Inspection and Palpation  Ecchymosis: none  Tenderness: calcaneus tenderness and plantar fascia   Swelling: (Plus one pitting edema lower extremity)  Arch: pes planus  Hammertoes: second toe, third toe, fourth toe and fifth toe  Claw Toes: absent  Hallux valgus: no  Hallux limitus: no  Skin Exam: skin intact;   Neurovascular  Dorsalis pedis: 2+  Posterior tibial: 2+  Capillary Refill: 2+  Saphenous nerve sensation: normal  Tibial nerve sensation: normal  Superficial peroneal nerve sensation: normal  Deep peroneal nerve sensation: normal  Sural nerve sensation: normal    Muscle Strength  Ankle dorsiflexion: 5  Ankle plantar flexion: 5  Ankle inversion: 5  Ankle eversion: 5  Great toe extension: 5  Great toe flexion: 5    Range of Motion    Normal right ankle ROM  Passive  Ankle dorsiflexion: 10    Active  Ankle dorsiflexion: 10    Tests  Calcaneal squeeze: negative   PT Tinel's sign: negative    Paresthesia: negative  Comments  Pain on palpation of the infeior medial heel and central plantar heel. No pain present  with side to side compression of the calcaneus. Negative tinnel's sign  at the tarsal tunnel. Negative Chang's nerve pain. Negative Calcaneal nerve pain. No soft tissue masses. Pain present with dorsiflexion of the ankle. No edema, erythema, or ecchymosis noted.      Left Foot/Ankle      Inspection and Palpation  Ecchymosis: none  Tenderness: plantar fascia, calcaneus tenderness and ankle   Swelling: ankle (+2 pitting edema lower extremity)  Arch: pes planus  Hammertoes: second toe, third toe, fourth toe and fifth toe  Claw toes: absent  Hallux valgus: no  Hallux limitus: no  Skin Exam:  skin intact;   Neurovascular  Dorsalis pedis: 2+  Posterior tibial: 2+  Capillary refill: 2+  Saphenous nerve sensation: normal  Tibial nerve sensation: normal  Superficial peroneal nerve sensation: normal  Deep peroneal nerve sensation: normal  Sural nerve sensation: normal    Muscle Strength  Ankle dorsiflexion: 5  Ankle plantar flexion: 5  Ankle inversion: 5  Ankle eversion: 5  Great toe extension: 5  Great toe flexion: 5    Range of Motion    Normal left ankle ROM  Passive  Ankle dorsiflexion: 5, pain    Active  Ankle dorsiflexion: 5    Tests  Calcaneal squeeze: negative   PT Tinel's sign: negative  Paresthesia: negative  Comments  Pain on palpation of the infeior medial heel and central plantar heel. No pain present  with side to side compression of the calcaneus. Negative tinnel's sign  at the tarsal tunnel. Negative Chang's nerve pain. Negative Calcaneal nerve pain. No soft tissue masses. Pain present with dorsiflexion of the ankle. No edema, erythema, or ecchymosis noted.      Decreased range of motion crepitus and pain and swelling ankle secondary to arthritis from previous ankle fracture  Physical Exam  Cardiovascular:      Pulses:           Dorsalis pedis pulses are 2+ on the right side and 2+ on the left side.        Posterior tibial pulses are 2+ on the right side and 2+ on the left side.   Musculoskeletal:      Left ankle: Swelling present. Tenderness present.      Right foot: No bunion.      Left foot: No bunion.        Legs:         Feet:              Right Ankle/Foot Exam     Range of Motion   The patient has normal right ankle ROM.    Comments:  Pain on palpation of the infeior medial heel and central plantar heel. No pain present  with side to side compression of the calcaneus. Negative tinnel's sign  at the tarsal tunnel. Negative Chang's nerve pain. Negative Calcaneal nerve pain. No soft tissue masses. Pain present with dorsiflexion of the ankle. No edema, erythema, or ecchymosis noted.       Left Ankle/Foot Exam     Range of Motion   The patient has normal left ankle ROM.     Muscle Strength   The patient has normal left ankle strength.    Comments:  Pain on palpation of the infeior medial heel and central plantar heel. No pain present  with side to side compression of the calcaneus. Negative tinnel's sign  at the tarsal tunnel. Negative Chang's nerve pain. Negative Calcaneal nerve pain. No soft tissue masses. Pain present with dorsiflexion of the ankle. No edema, erythema, or ecchymosis noted.      Decreased range of motion crepitus and pain and swelling ankle secondary to arthritis from previous ankle fracture      Muscle Strength   Right Lower Extremity   Ankle Dorsiflexion:  5   Plantar flexion:  5/5  Left Lower Extremity   Ankle Dorsiflexion:  5   Plantar flexion:  5/5     Vascular Exam     Right Pulses  Dorsalis Pedis:      2+  Posterior Tibial:      2+        Left Pulses  Dorsalis Pedis:      2+  Posterior Tibial:      2+         Imaging:   AP, lateral, lateral oblique and axial calcaneal weight-bearing x-rays bilateral:  No acute fractures, no bone tumors, no soft tissue masses seen.  Multiple plantar inferior calcaneal exostosis large posterior calcaneal exostosis bilateral.  Hammertoe deformities.  Abnormal changes 5th metatarsal base right foot.  Mild arthritis throughout the foot.  Arthritis of left ankle seen on lateral x-ray.         Assessment:       1. Plantar fasciitis    2. Pain in both feet    3. Encounter for diabetic foot exam    4. Type 2 diabetes mellitus without complication, without long-term current use of insulin    5. Lymphedema of left lower extremity    6. Post-traumatic osteoarthritis of left ankle      Plan:   Plantar fasciitis    Pain in both feet  -     X-Ray Foot Complete Bilateral    Encounter for diabetic foot exam    Type 2 diabetes mellitus without complication, without long-term current use of insulin    Lymphedema of left lower extremity    Post-traumatic  osteoarthritis of left ankle    Today evaluated patient long discussion with her about the filing pathological findings on the exam today:    1. She has plantar fasciitis heel spur syndrome of both feet I explained to her what this is gave her the clinical description is was reviewed her x-rays of the multiple exostosis that she has.  Explained to her that using this treated conservatively majority of people will get adequate relief with conservative care without doing surgery.  We will do plantar fasciitis level 1 treatment and she will see how she is doing 1st 4 weeks of treatment.  If she is at least 40% better she will not return to see me if she is not at least 40% better she return for further evaluation.    Discussed different treatment options for heel pain. I gave written and verbal instructions on heel cord stretching and this was demonstrated for the patient. Patient expressed understanding. Discussed wearing appropriate shoe gear and avoiding flats, slippers, sandals, barefoot, and sockfeet. Recommended arch supports. My recommendation for OTC supports is Spenco polysorb replacement insoles or patient may elect more aggressive treatment with prescription arch supports. We also discussed cortisone injections and NSAID therapy.      Plantar Fasciitis Level I Treatment:   Anti-inflammatory Mobic 15 mg 1 pill daily with food  No bare feet  Over the counter insert cross trainers by [employer]  Restrict activity level   Use running shoes at full time refer to varsity sports  No impact exercise and stretch gastrocnemius muscle   Return in 4 weeks if not at least 30-40% improvement    2.  I discussed with her she has arthritis of her left ankle which is causing the pain and limited range of motion and the treatment choices or treating the symptoms which she is been doing with continued occasional anti-inflammatories good supportive shoes and compression hose to help with the swelling that has been aggravated by the  ankle fracture with her venous incompetency.  Told her in the future she can try cortisone injections or she may need an implant or fusion if it becomes more arthritic and painful.    I discussed the conservative treatent of arthritis consisiting of shoe modification, OTC NSAID, cortisone shots, a series of supartz injections, avoiding painful activities and common sense.  I explained that the arthritis may become more painful causng more disability and the possibility of further treatment.  Also discussed may need evaluation by Rheumatologist for possible inflammatory arthritis.     3. I discussed with her she has chronic venous incompetency and lymphedema and peripheral edema both lower extremities left worse than right.  The left worse because of the ankle arthritis I recommend she use compression hose she placed on 1st control swelling.  No other treatment recommended at this time.    I discussed venous incompetency and sequela of edema, skin pigmentation with hemosiderin deposits, potential ulcer formation.  I discussed compression hose, elevation and possible vascular consult.       Procedures          Counseling:     I provided patient education verbally regarding:   Patient diagnosis, treatment options, as well as alternatives, risks, and benefits.     This note was created using Dragon voice recognition software that occasionally misinterpreted phrases or words.

## 2022-11-22 DIAGNOSIS — M79.672 PAIN IN BOTH FEET: ICD-10-CM

## 2022-11-22 DIAGNOSIS — M72.2 PLANTAR FASCIITIS: Primary | ICD-10-CM

## 2022-11-22 DIAGNOSIS — M79.671 PAIN IN BOTH FEET: ICD-10-CM

## 2022-11-22 RX ORDER — MELOXICAM 15 MG/1
15 TABLET ORAL DAILY
Qty: 90 TABLET | Refills: 2 | Status: SHIPPED | OUTPATIENT
Start: 2022-11-22 | End: 2023-06-06

## 2022-11-22 NOTE — TELEPHONE ENCOUNTER
----- Message from Estela Mensah sent at 11/22/2022  9:57 AM CST -----  Regarding: Refil Medications  Pt. Called and she has only one refill remaining on her prescription and is requesting a 90 day prescription instead of a 30 day prescription on her medications.     Thank you,   Estela

## 2023-02-06 ENCOUNTER — TELEPHONE (OUTPATIENT)
Dept: FAMILY MEDICINE | Facility: CLINIC | Age: 63
End: 2023-02-06

## 2023-02-06 DIAGNOSIS — E11.9 TYPE 2 DIABETES MELLITUS WITHOUT COMPLICATION, WITHOUT LONG-TERM CURRENT USE OF INSULIN: Primary | ICD-10-CM

## 2023-02-06 DIAGNOSIS — I10 ESSENTIAL HYPERTENSION: ICD-10-CM

## 2023-02-06 DIAGNOSIS — E66.01 MORBID OBESITY: ICD-10-CM

## 2023-02-09 LAB
ALBUMIN SERPL-MCNC: 3.9 G/DL (ref 3.6–5.1)
ALBUMIN/CREAT UR: 1 MCG/MG CREAT
ALBUMIN/GLOB SERPL: 1.4 (CALC) (ref 1–2.5)
ALP SERPL-CCNC: 54 U/L (ref 37–153)
ALT SERPL-CCNC: 13 U/L (ref 6–29)
APPEARANCE UR: CLEAR
AST SERPL-CCNC: 10 U/L (ref 10–35)
BACTERIA #/AREA URNS HPF: NORMAL /HPF
BACTERIA UR CULT: NORMAL
BASOPHILS # BLD AUTO: 18 CELLS/UL (ref 0–200)
BASOPHILS NFR BLD AUTO: 0.4 %
BILIRUB SERPL-MCNC: 0.5 MG/DL (ref 0.2–1.2)
BILIRUB UR QL STRIP: NEGATIVE
BUN SERPL-MCNC: 14 MG/DL (ref 7–25)
BUN/CREAT SERPL: ABNORMAL (CALC) (ref 6–22)
CALCIUM SERPL-MCNC: 9.6 MG/DL (ref 8.6–10.4)
CHLORIDE SERPL-SCNC: 105 MMOL/L (ref 98–110)
CHOLEST SERPL-MCNC: 130 MG/DL
CHOLEST/HDLC SERPL: 2.5 (CALC)
CO2 SERPL-SCNC: 29 MMOL/L (ref 20–32)
COLOR UR: YELLOW
CREAT SERPL-MCNC: 0.86 MG/DL (ref 0.5–1.05)
CREAT UR-MCNC: 173 MG/DL (ref 20–275)
EGFR: 76 ML/MIN/1.73M2
EOSINOPHIL # BLD AUTO: 152 CELLS/UL (ref 15–500)
EOSINOPHIL NFR BLD AUTO: 3.3 %
ERYTHROCYTE [DISTWIDTH] IN BLOOD BY AUTOMATED COUNT: 12.6 % (ref 11–15)
GLOBULIN SER CALC-MCNC: 2.7 G/DL (CALC) (ref 1.9–3.7)
GLUCOSE SERPL-MCNC: 106 MG/DL (ref 65–99)
GLUCOSE UR QL STRIP: NEGATIVE
HBA1C MFR BLD: 6.4 % OF TOTAL HGB
HCT VFR BLD AUTO: 41.3 % (ref 35–45)
HDLC SERPL-MCNC: 51 MG/DL
HGB BLD-MCNC: 13.5 G/DL (ref 11.7–15.5)
HGB UR QL STRIP: NEGATIVE
HYALINE CASTS #/AREA URNS LPF: NORMAL /LPF
KETONES UR QL STRIP: NEGATIVE
LDLC SERPL CALC-MCNC: 61 MG/DL (CALC)
LEUKOCYTE ESTERASE UR QL STRIP: NEGATIVE
LYMPHOCYTES # BLD AUTO: 2236 CELLS/UL (ref 850–3900)
LYMPHOCYTES NFR BLD AUTO: 48.6 %
MCH RBC QN AUTO: 30.1 PG (ref 27–33)
MCHC RBC AUTO-ENTMCNC: 32.7 G/DL (ref 32–36)
MCV RBC AUTO: 92.2 FL (ref 80–100)
MICROALBUMIN UR-MCNC: 0.2 MG/DL
MONOCYTES # BLD AUTO: 391 CELLS/UL (ref 200–950)
MONOCYTES NFR BLD AUTO: 8.5 %
NEUTROPHILS # BLD AUTO: 1803 CELLS/UL (ref 1500–7800)
NEUTROPHILS NFR BLD AUTO: 39.2 %
NITRITE UR QL STRIP: NEGATIVE
NONHDLC SERPL-MCNC: 79 MG/DL (CALC)
PH UR STRIP: NORMAL [PH] (ref 5–8)
PLATELET # BLD AUTO: 299 THOUSAND/UL (ref 140–400)
PMV BLD REES-ECKER: 10.7 FL (ref 7.5–12.5)
POTASSIUM SERPL-SCNC: 4.8 MMOL/L (ref 3.5–5.3)
PROT SERPL-MCNC: 6.6 G/DL (ref 6.1–8.1)
PROT UR QL STRIP: NEGATIVE
RBC # BLD AUTO: 4.48 MILLION/UL (ref 3.8–5.1)
RBC #/AREA URNS HPF: NORMAL /HPF
SERVICE CMNT-IMP: NORMAL
SODIUM SERPL-SCNC: 142 MMOL/L (ref 135–146)
SP GR UR STRIP: 1.02 (ref 1–1.03)
SQUAMOUS #/AREA URNS HPF: NORMAL /HPF
TRIGL SERPL-MCNC: 93 MG/DL
TSH SERPL-ACNC: 1.64 MIU/L (ref 0.4–4.5)
WBC # BLD AUTO: 4.6 THOUSAND/UL (ref 3.8–10.8)
WBC #/AREA URNS HPF: NORMAL /HPF

## 2023-02-14 ENCOUNTER — OFFICE VISIT (OUTPATIENT)
Dept: FAMILY MEDICINE | Facility: CLINIC | Age: 63
End: 2023-02-14
Payer: MEDICARE

## 2023-02-14 VITALS
HEIGHT: 68 IN | BODY MASS INDEX: 44.41 KG/M2 | DIASTOLIC BLOOD PRESSURE: 72 MMHG | HEART RATE: 60 BPM | OXYGEN SATURATION: 98 % | SYSTOLIC BLOOD PRESSURE: 130 MMHG | WEIGHT: 293 LBS

## 2023-02-14 DIAGNOSIS — G47.33 OSA (OBSTRUCTIVE SLEEP APNEA): ICD-10-CM

## 2023-02-14 DIAGNOSIS — I71.23 ANEURYSM OF DESCENDING THORACIC AORTA WITHOUT RUPTURE: ICD-10-CM

## 2023-02-14 DIAGNOSIS — E11.9 TYPE 2 DIABETES MELLITUS WITHOUT COMPLICATION, WITHOUT LONG-TERM CURRENT USE OF INSULIN: Primary | ICD-10-CM

## 2023-02-14 DIAGNOSIS — E66.01 MORBID OBESITY: ICD-10-CM

## 2023-02-14 DIAGNOSIS — M46.96 INFLAMMATORY SPONDYLOPATHY OF LUMBAR REGION: ICD-10-CM

## 2023-02-14 DIAGNOSIS — E55.9 VITAMIN D DEFICIENCY: ICD-10-CM

## 2023-02-14 DIAGNOSIS — N39.46 MIXED STRESS AND URGE URINARY INCONTINENCE: ICD-10-CM

## 2023-02-14 DIAGNOSIS — I10 ESSENTIAL HYPERTENSION: ICD-10-CM

## 2023-02-14 DIAGNOSIS — Z23 FLU VACCINE NEED: ICD-10-CM

## 2023-02-14 PROCEDURE — 3044F PR MOST RECENT HEMOGLOBIN A1C LEVEL <7.0%: ICD-10-PCS | Mod: CPTII,S$GLB,, | Performed by: FAMILY MEDICINE

## 2023-02-14 PROCEDURE — 3008F PR BODY MASS INDEX (BMI) DOCUMENTED: ICD-10-PCS | Mod: CPTII,S$GLB,, | Performed by: FAMILY MEDICINE

## 2023-02-14 PROCEDURE — 3075F PR MOST RECENT SYSTOLIC BLOOD PRESS GE 130-139MM HG: ICD-10-PCS | Mod: CPTII,S$GLB,, | Performed by: FAMILY MEDICINE

## 2023-02-14 PROCEDURE — 3061F NEG MICROALBUMINURIA REV: CPT | Mod: CPTII,S$GLB,, | Performed by: FAMILY MEDICINE

## 2023-02-14 PROCEDURE — 3078F DIAST BP <80 MM HG: CPT | Mod: CPTII,S$GLB,, | Performed by: FAMILY MEDICINE

## 2023-02-14 PROCEDURE — 3061F PR NEG MICROALBUMINURIA RESULT DOCUMENTED/REVIEW: ICD-10-PCS | Mod: CPTII,S$GLB,, | Performed by: FAMILY MEDICINE

## 2023-02-14 PROCEDURE — 3066F NEPHROPATHY DOC TX: CPT | Mod: CPTII,S$GLB,, | Performed by: FAMILY MEDICINE

## 2023-02-14 PROCEDURE — 4010F PR ACE/ARB THEARPY RXD/TAKEN: ICD-10-PCS | Mod: CPTII,S$GLB,, | Performed by: FAMILY MEDICINE

## 2023-02-14 PROCEDURE — 1159F MED LIST DOCD IN RCRD: CPT | Mod: CPTII,S$GLB,, | Performed by: FAMILY MEDICINE

## 2023-02-14 PROCEDURE — G0008 FLU VACCINE - QUADRIVALENT (RECOMBINANT) PRESERVATIVE FREE: ICD-10-PCS | Mod: S$GLB,,, | Performed by: FAMILY MEDICINE

## 2023-02-14 PROCEDURE — 3044F HG A1C LEVEL LT 7.0%: CPT | Mod: CPTII,S$GLB,, | Performed by: FAMILY MEDICINE

## 2023-02-14 PROCEDURE — 90682 RIV4 VACC RECOMBINANT DNA IM: CPT | Mod: S$GLB,,, | Performed by: FAMILY MEDICINE

## 2023-02-14 PROCEDURE — 3008F BODY MASS INDEX DOCD: CPT | Mod: CPTII,S$GLB,, | Performed by: FAMILY MEDICINE

## 2023-02-14 PROCEDURE — 3066F PR DOCUMENTATION OF TREATMENT FOR NEPHROPATHY: ICD-10-PCS | Mod: CPTII,S$GLB,, | Performed by: FAMILY MEDICINE

## 2023-02-14 PROCEDURE — 3075F SYST BP GE 130 - 139MM HG: CPT | Mod: CPTII,S$GLB,, | Performed by: FAMILY MEDICINE

## 2023-02-14 PROCEDURE — 1159F PR MEDICATION LIST DOCUMENTED IN MEDICAL RECORD: ICD-10-PCS | Mod: CPTII,S$GLB,, | Performed by: FAMILY MEDICINE

## 2023-02-14 PROCEDURE — 90682 FLU VACCINE - QUADRIVALENT (RECOMBINANT) PRESERVATIVE FREE: ICD-10-PCS | Mod: S$GLB,,, | Performed by: FAMILY MEDICINE

## 2023-02-14 PROCEDURE — 99214 OFFICE O/P EST MOD 30 MIN: CPT | Mod: S$GLB,,, | Performed by: FAMILY MEDICINE

## 2023-02-14 PROCEDURE — 4010F ACE/ARB THERAPY RXD/TAKEN: CPT | Mod: CPTII,S$GLB,, | Performed by: FAMILY MEDICINE

## 2023-02-14 PROCEDURE — G0008 ADMIN INFLUENZA VIRUS VAC: HCPCS | Mod: S$GLB,,, | Performed by: FAMILY MEDICINE

## 2023-02-14 PROCEDURE — 3078F PR MOST RECENT DIASTOLIC BLOOD PRESSURE < 80 MM HG: ICD-10-PCS | Mod: CPTII,S$GLB,, | Performed by: FAMILY MEDICINE

## 2023-02-14 PROCEDURE — 99214 PR OFFICE/OUTPT VISIT, EST, LEVL IV, 30-39 MIN: ICD-10-PCS | Mod: S$GLB,,, | Performed by: FAMILY MEDICINE

## 2023-02-14 RX ORDER — SOLIFENACIN SUCCINATE 10 MG/1
10 TABLET, FILM COATED ORAL DAILY
Qty: 90 TABLET | Refills: 1 | Status: SHIPPED | OUTPATIENT
Start: 2023-02-14 | End: 2023-06-06 | Stop reason: SDUPTHER

## 2023-02-14 RX ORDER — METFORMIN HYDROCHLORIDE 750 MG/1
750 TABLET, EXTENDED RELEASE ORAL
Qty: 180 TABLET | Refills: 1 | Status: CANCELLED | OUTPATIENT
Start: 2023-02-14

## 2023-02-14 RX ORDER — DAPAGLIFLOZIN 10 MG/1
10 TABLET, FILM COATED ORAL DAILY
Qty: 90 TABLET | Refills: 1 | Status: SHIPPED | OUTPATIENT
Start: 2023-02-14 | End: 2023-06-06 | Stop reason: ALTCHOICE

## 2023-02-14 RX ORDER — ATORVASTATIN CALCIUM 10 MG/1
10 TABLET, FILM COATED ORAL DAILY
Qty: 90 TABLET | Refills: 3 | Status: SHIPPED | OUTPATIENT
Start: 2023-02-14 | End: 2023-11-29 | Stop reason: SDUPTHER

## 2023-02-14 NOTE — PROGRESS NOTES
SUBJECTIVE:   HPI: Martha Miguel  is a 62 y.o. female who presents for annual physical .   Annual Exam (Annual exam/ discuss metformin lasix medication/ bilateral ear stuffiness constantly/ eye referral/ need flu vaccine/ has appointment to cardiologist 03/03/2023//mp)    Patient with type 2 diabetes and overactive bladder is in for visit.   Labs are performed in all within normal limits with good control of her diabetes.  She will see eye doctor soon.  She has history of stable thoracic aneurysm and was see a cardiologist on next month.    She does have some concerns about medications.  Has been on metformin for some time and has concerns over article she has been reading and she would like to try different medicine.  Additionally she has been using oxybutynin and has complaints of dry mouth.  She does need this medication for bladder control.    She continues to work on her weight.  She is up-to-date with mammogram and colon cancer screening.  She continues to try to exercise.    (Not in a hospital admission)    Review of patient's allergies indicates:   Allergen Reactions    Phenytoin Hives    Dilantin [phenytoin sodium extended] Rash    Lovenox [enoxaparin] Rash and Hives     Current Outpatient Medications on File Prior to Visit   Medication Sig Dispense Refill    aspirin (ECOTRIN) 81 MG EC tablet Take 81 mg by mouth once daily.      carvediloL (COREG) 3.125 MG tablet Take 1 tablet (3.125 mg total) by mouth 2 (two) times daily with meals. 180 tablet 1    lisinopriL (PRINIVIL,ZESTRIL) 2.5 MG tablet Take 1 tablet (2.5 mg total) by mouth once daily. 90 tablet 1    meloxicam (MOBIC) 15 MG tablet Take 1 tablet (15 mg total) by mouth once daily. 90 tablet 2    [DISCONTINUED] atorvastatin (LIPITOR) 10 MG tablet Take 1 tablet (10 mg total) by mouth once daily. 90 tablet 1    [DISCONTINUED] furosemide (LASIX) 20 MG tablet Take 1 tablet (20 mg total) by mouth every morning. 90 tablet 1    [DISCONTINUED] metFORMIN  (GLUCOPHAGE-XR) 750 MG ER 24hr tablet Take 1 tablet (750 mg total) by mouth 2 (two) times daily before meals. 180 tablet 1    [DISCONTINUED] oxybutynin (DITROPAN-XL) 10 MG 24 hr tablet Take 2 tablets (20 mg total) by mouth once daily. 180 tablet 1    [DISCONTINUED] potassium chloride SA (K-DUR,KLOR-CON) 10 MEQ tablet Take 1 tablet (10 mEq total) by mouth once daily. 90 tablet 1     No current facility-administered medications on file prior to visit.     Past Medical History:   Diagnosis Date    Abnormal heart rhythm     Allergy     Arthritis     COPD (chronic obstructive pulmonary disease)     Coronary artery disease     Depression     Diabetes mellitus, type 2     Essential hypertension 8/16/2016    Right sided sciatica 4/23/2018     Past Surgical History:   Procedure Laterality Date    CHOLECYSTECTOMY      FRACTURE SURGERY      HYSTERECTOMY      INJECTION OF ANESTHETIC AGENT AROUND MEDIAL BRANCH NERVES INNERVATING LUMBAR FACET JOINT Bilateral 7/5/2018    Procedure: Block-nerve-medial branch-lumbar;  Surgeon: Arthur Orellana MD;  Location: [facility] OR;  Service: Pain Management;  Laterality: Bilateral;  L2, 3, 4, 5    TONSILLECTOMY       Family History   Family history unknown: Yes     Social History     Tobacco Use    Smoking status: Never    Smokeless tobacco: Never   Substance Use Topics    Alcohol use: No    Drug use: No      Health Maintenance Topics with due status: Not Due       Topic Last Completion Date    TETANUS VACCINE 04/06/2015    Colorectal Cancer Screening 04/22/2021    Pneumococcal Vaccines (Age 0-64) 08/09/2022    Mammogram 09/14/2022    Foot Exam 09/27/2022    Diabetes Urine Screening 02/08/2023    Lipid Panel 02/08/2023    Hemoglobin A1c 02/08/2023    Low Dose Statin 02/14/2023     Immunization History   Administered Date(s) Administered    COVID-19 MRNA, LN-S PF (MODERNA HALF 0.25 ML DOSE) 01/12/2022, 08/08/2022    COVID-19, MRNA, LN-S, PF (MODERNA FULL 0.5 ML DOSE) 02/26/2021, 03/26/2021    Influenza  "(FLUBLOK) - Quadrivalent - Recombinant - PF *Preferred* (egg allergy) 10/31/2019, 09/02/2020, 09/30/2021    Influenza - Quadrivalent - PF *Preferred* (6 months and older) 10/06/2016    Pneumococcal Polysaccharide - 23 Valent 08/09/2022    Tdap 04/06/2015    Zoster Recombinant 10/07/2021, 03/17/2022       Review of Systems   Constitutional:  Negative for activity change, fatigue and unexpected weight change.   HENT:  Positive for postnasal drip. Negative for hearing loss, sinus pressure, sore throat and voice change.    Eyes:  Negative for photophobia and visual disturbance.   Respiratory:  Negative for cough, shortness of breath and wheezing.    Cardiovascular:  Negative for chest pain and palpitations.   Gastrointestinal:  Negative for constipation, diarrhea and nausea.   Genitourinary:  Negative for difficulty urinating, frequency, hematuria and urgency.   Musculoskeletal:  Positive for arthralgias. Negative for back pain.   Skin:  Negative for rash.   Neurological:  Negative for weakness, light-headedness and headaches.   Hematological:  Negative for adenopathy. Does not bruise/bleed easily.   Psychiatric/Behavioral:  The patient is not nervous/anxious.     OBJECTIVE:      Vitals:    02/14/23 1358   BP: 130/72   Pulse: 60   SpO2: 98%   Weight: (!) 149.5 kg (329 lb 9.6 oz)   Height: 5' 8" (1.727 m)     Physical Exam  Vitals reviewed.   Constitutional:       General: She is not in acute distress.     Appearance: Normal appearance. She is well-developed.   HENT:      Head: Normocephalic and atraumatic.      Right Ear: External ear normal.      Left Ear: External ear normal.      Nose: Nose normal.      Mouth/Throat:      Mouth: Mucous membranes are moist.   Eyes:      General: Lids are normal.      Conjunctiva/sclera: Conjunctivae normal.      Pupils: Pupils are equal, round, and reactive to light.   Neck:      Thyroid: No thyromegaly.      Vascular: No JVD.      Trachea: No tracheal deviation.   Cardiovascular:     "  Rate and Rhythm: Normal rate and regular rhythm.      Chest Wall: PMI is not displaced.      Pulses: Normal pulses.      Heart sounds: Normal heart sounds.   Pulmonary:      Effort: Pulmonary effort is normal.      Breath sounds: Normal breath sounds.   Abdominal:      General: Bowel sounds are normal.      Palpations: Abdomen is soft.      Tenderness: There is no abdominal tenderness. There is no guarding or rebound.   Musculoskeletal:         General: No tenderness. Normal range of motion.      Cervical back: Full passive range of motion without pain and neck supple.   Skin:     General: Skin is warm and dry.      Findings: No rash.   Neurological:      General: No focal deficit present.      Mental Status: She is alert and oriented to person, place, and time.   Psychiatric:         Mood and Affect: Mood normal.         Behavior: Behavior normal.      Assessment:       1. Type 2 diabetes mellitus without complication, without long-term current use of insulin    2. Essential hypertension    3. Inflammatory spondylopathy of lumbar region    4. ZABRINA (obstructive sleep apnea)    5. Vitamin D deficiency    6. Mixed stress and urge urinary incontinence    7. Aneurysm of descending thoracic aorta without rupture    8. Morbid obesity    9. Flu vaccine need          Plan:       Type 2 diabetes mellitus without complication, without long-term current use of insulin  -     atorvastatin (LIPITOR) 10 MG tablet; Take 1 tablet (10 mg total) by mouth once daily.  Dispense: 90 tablet; Refill: 3  -     dapagliflozin (FARXIGA) 10 mg tablet; Take 1 tablet (10 mg total) by mouth once daily. For diabetes  Dispense: 90 tablet; Refill: 1  -     Hemoglobin A1C; Future; Expected date: 08/05/2023  -     Renal Function Panel; Future; Expected date: 08/05/2023    Essential hypertension  -     Renal Function Panel; Future; Expected date: 08/05/2023    Inflammatory spondylopathy of lumbar region    ZABRINA (obstructive sleep apnea)    Vitamin D  deficiency    Mixed stress and urge urinary incontinence  -     solifenacin (VESICARE) 10 MG tablet; Take 1 tablet (10 mg total) by mouth once daily. For bladder  Dispense: 90 tablet; Refill: 1    Aneurysm of descending thoracic aorta without rupture    Morbid obesity    Flu vaccine need  -     Influenza - Quadrivalent (Recombinant) (PF)        Counseled on age and gender appropriate medical preventative services, including cancer screenings, immunizations, overall nutritional health, need for a consistent exercise regimen and an overall push towards maintaining a vigorous and active lifestyle.      Follow up in about 6 months (around 8/14/2023) for Diabetic Check-Up, bladder.

## 2023-02-15 DIAGNOSIS — E11.9 TYPE 2 DIABETES MELLITUS WITHOUT COMPLICATION, WITHOUT LONG-TERM CURRENT USE OF INSULIN: Primary | ICD-10-CM

## 2023-02-15 NOTE — TELEPHONE ENCOUNTER
----- Message from Milly Ramos sent at 2/15/2023  1:21 PM CST -----  Vm- pt needs to talk to nurse about diabetic machine   [phone]

## 2023-02-16 RX ORDER — INSULIN PUMP SYRINGE, 3 ML
EACH MISCELLANEOUS
Qty: 1 EACH | Refills: 0 | Status: SHIPPED | OUTPATIENT
Start: 2023-02-16 | End: 2024-02-15

## 2023-02-16 NOTE — TELEPHONE ENCOUNTER
prescription sent to   [facility] - [place] - [address] [facility] [facility]  [address] [facility] [address] [facility] [address]  [place] [address]  Phone: [phone] Fax: [phone]

## 2023-05-11 ENCOUNTER — TELEPHONE (OUTPATIENT)
Dept: FAMILY MEDICINE | Facility: CLINIC | Age: 63
End: 2023-05-11

## 2023-05-11 NOTE — TELEPHONE ENCOUNTER
Spoke with patient c/o bladder infection.  Informed MD unavailable in office today, will need to report to urgent care.  Patient verbalized understanding -DN

## 2023-05-11 NOTE — TELEPHONE ENCOUNTER
----- Message from Milly Ramos sent at 5/11/2023 11:42 AM CDT -----  Vm- pt has a problem she needs to talk to the nurse about   [phone]

## 2023-05-13 ENCOUNTER — HOSPITAL ENCOUNTER (EMERGENCY)
Facility: HOSPITAL | Age: 63
Discharge: HOME | End: 2023-05-14
Attending: EMERGENCY MEDICINE
Payer: MEDICARE

## 2023-05-13 DIAGNOSIS — R52 PAIN: ICD-10-CM

## 2023-05-13 PROCEDURE — 99284 EMERGENCY DEPT VISIT MOD MDM: CPT | Mod: 25

## 2023-05-14 VITALS
TEMPERATURE: 99 F | HEIGHT: 68 IN | RESPIRATION RATE: 18 BRPM | DIASTOLIC BLOOD PRESSURE: 95 MMHG | OXYGEN SATURATION: 97 % | HEART RATE: 60 BPM | WEIGHT: 293 LBS | SYSTOLIC BLOOD PRESSURE: 162 MMHG | BODY MASS INDEX: 44.41 KG/M2

## 2023-05-14 PROCEDURE — 25000003 PHARM REV CODE 250: Performed by: EMERGENCY MEDICINE

## 2023-05-14 RX ORDER — IBUPROFEN 400 MG/1
800 TABLET ORAL
Status: COMPLETED | OUTPATIENT
Start: 2023-05-14 | End: 2023-05-14

## 2023-05-14 RX ADMIN — IBUPROFEN 800 MG: 400 TABLET ORAL at 12:05

## 2023-05-14 NOTE — ED PROVIDER NOTES
Encounter Date: 5/13/2023       History     Chief Complaint   Patient presents with    right knee and lower extremity pain     Since Sunday, and today the patient states she was getting out of the tub and heard and felt a pop behind her right knee and down into her right calf.  Patient also states she cannot put any weight on that leg.     62-year-old female presents with a chief complaint of right knee pain and right calf pain that has been present since last Sunday.  Patient states she was getting in heard something pop behind her knee.  Patient states she is unable to walk on affected leg without severe pain.    The history is provided by the patient.   Review of patient's allergies indicates:   Allergen Reactions    Phenytoin Hives    Dilantin [phenytoin sodium extended] Rash    Lovenox [enoxaparin] Rash and Hives     Past Medical History:   Diagnosis Date    Abnormal heart rhythm     Allergy     Arthritis     COPD (chronic obstructive pulmonary disease)     Coronary artery disease     Depression     Diabetes mellitus, type 2     Essential hypertension 8/16/2016    Right sided sciatica 4/23/2018     Past Surgical History:   Procedure Laterality Date    CHOLECYSTECTOMY      FRACTURE SURGERY      HYSTERECTOMY      INJECTION OF ANESTHETIC AGENT AROUND MEDIAL BRANCH NERVES INNERVATING LUMBAR FACET JOINT Bilateral 7/5/2018    Procedure: Block-nerve-medial branch-lumbar;  Surgeon: Arthur Orellana MD;  Location: [facility] OR;  Service: Pain Management;  Laterality: Bilateral;  L2, 3, 4, 5    TONSILLECTOMY       Family History   Family history unknown: Yes     Social History     Tobacco Use    Smoking status: Never    Smokeless tobacco: Never   Substance Use Topics    Alcohol use: No    Drug use: No     Review of Systems    Physical Exam     Initial Vitals [05/13/23 2007]   BP Pulse Resp Temp SpO2   110/73 68 18 98.3 °F (36.8 °C) 99 %      MAP       --         Physical Exam    Constitutional: She appears well-developed and  well-nourished. No distress.   HENT:   Head: Normocephalic.   Eyes: Pupils are equal, round, and reactive to light.   Neck:   Normal range of motion.  Cardiovascular:  Normal rate.           Pulmonary/Chest: Breath sounds normal.   Abdominal: Abdomen is soft.   Musculoskeletal:         General: Tenderness and edema present.      Cervical back: Normal range of motion.      Comments: Right knee tender medially and laterally with mild edema.     Neurological: She is alert and oriented to person, place, and time. She has normal strength and normal reflexes.   Skin: Skin is warm.       ED Course   Procedures  Labs Reviewed - No data to display       Imaging Results              US Lower Extremity Veins Left (Final result)  Result time 05/13/23 23:04:40      Final result by Alejandra Oneill MD (05/13/23 23:04:40)                   Narrative:    EXAM DESCRIPTION:  US LOWER EXTREMITY VEINS LEFT 5/13/2023 10:23 PM CDT  [facility]: US LOWER EXTREMITY VEINS LIMITED FOLLOW-UP UNILATERAL    CLINICAL HISTORY:  62 years Female, pain    COMPARISON:  None    TECHNIQUE: Duplex Doppler examination was performed through the right lower extremity from groin to the mid calf utilizing grayscale, color flow imaging, and Doppler spectral analysis including compression and physiologic maneuvers.    FINDINGS: No evidence of intraluminal thrombus is identified involving the visualized portions of the common femoral, proximal greater saphenous, proximal profunda femoris, femoral, and popliteal veins. No evidence of intraluminal thrombus is seen in the visualized calf veins. The vessels are patent and compressible. Color Doppler flow and augmentation was identified in each of these vessels. The surrounding soft tissues are unremarkable.    IMPRESSION:    NO EVIDENCE OF ACUTE DEEP VENOUS THROMBOSIS IN THE VISUALIZED VESSELS OF THE RIGHT LOWER EXTREMITY.    Electronically signed by:  Alejandra Oneill MD  5/13/2023 11:04 PM CDT Workstation: [id number]                                      X-Ray Knee 3 View Right (Final result)  Result time 05/13/23 23:05:57      Final result by Alejandra Oneill MD (05/13/23 23:05:57)                   Narrative:    EXAM DESCRIPTION:  XR KNEE 3 VIEW RIGHT 5/13/2023 10:20 PM CDT  [facility]: XR KNEE 3 VIEWS    CLINICAL HISTORY:  62 years Female, pain    COMPARISON:  None    TECHNIQUE: 3 AP, lateral, and oblique radiographic views of the right knee    FINDINGS:  No evidence of acute displaced fracture or dislocation. Joint spaces are grossly preserved without evidence of significant degenerative changes. No significant joint effusion is definitely seen. Soft tissues appear grossly unremarkable.    IMPRESSION: No acute displaced osseous abnormality is visualized.    Electronically signed by:  Alejandra Oneill MD  5/13/2023 11:05 PM CDT Workstation: [id number]                                  X-Rays:   Independently Interpreted Readings:   Other Readings:  X-ray right knee reveals no fracture dislocation  Medications   ibuprofen tablet 800 mg (800 mg Oral Given 5/14/23 0019)     Medical Decision Making:   Initial Assessment:   62-year-old female presents with a chief complaint of right knee pain and right calf pain that has been present since last Sunday.  Patient states she was getting in heard something pop behind her knee.  Patient states she is unable to walk on affected leg without severe pain.  Patient denies trauma or injury.  Patient denies fall.  Significant medical history for right-sided sciatica, hypertension, type 2 diabetes, heart arrhythmia, CAD.     The history is provided by the patient.   Differential Diagnosis:   Urgent considerations include Achilles tendon rupture, torn meniscus, knee dislocation, knee fracture, ACL tear, PCL tear, LCL tear ACL strain, MCL tear, arthritis of the knee, knee sprain, DVT  Independently Interpreted Test(s):   I have ordered and independently interpreted X-rays - see prior notes.  Clinical Tests:    Radiological Study: Ordered and Reviewed  ED Management:  62-year-old morbidly obese female presents for emergent evaluation of right knee pain that radiates into her right calf present for 1 week.  Patient states she was getting out of the tub last Sunday when she heard felt something pop behind her right knee and down into her calf.  Lachman's test is negative.  Anterior posterior drawer test negative.  Negative for acute knee injury based on evaluation of [place] knee rules.  There is no evidence of trauma or deformity to the knee.  Patient is neurovascularly intact.  Sensation is equal bilaterally to lower extremities.  Capillary refill is less than 2 seconds.  Tenderness noted on palpation to calf muscle.  No warmth or erythema noted Patient is in no acute distress blood pressure is 162/95, pulse is 60, oxygen saturation is 97%, and respirations are 18.  Patient states she did not take her blood pressure medication today because she was not at home.  Knee x-rays completed and are negative for any osseous abnormalities or acute knee injury.  Ultrasound of lower right leg veins is negative for DVT.  Ace wrap to right knee done.  Crutches ordered and patient instructed to remain nonweightbearing on affected knee.  Referral to ortho periodic surgeon.  Instructed patient to make appointment 1st thing Monday morning.  800 mg ibuprofen given in the ED. Instructed patient to continue ibuprofen after discharge and she may alternate with Tylenol as directed on bottle.  Instructed patient on elevating leg and applying ice 20 minutes at a time.The likelihood of other entities in the differential is insufficient to justify any further testing for them.  This was explained to the patient. The patient was advised that persistent or worsening symptoms would require further evaluations. Returned precautions discussed and discharge plan reviewed. Patient verbalizes understanding and when to return the emergency room.     Attending Note:  I was available for consultation on this patient but I did not directly see the patient  Gris Mcgrath M.D. 5/15/2023 5:10 AM                            Clinical Impression:   Final diagnoses:  [R52] Pain        ED Disposition Condition    Discharge Stable          ED Prescriptions    None       Follow-up Information       Follow up With Specialties Details Why Contact Info Additional Information    Brice Ruiz MD Orthopedic Surgery   [address] [place] [address] [address] [place] [id number]  [phone]       [facility] - Emergency Dept Emergency Medicine Go to  As needed, If symptoms worsen [address] [facility] [address]  [phone] 1st floor             Tana Han NP  05/15/23 0021       Gris Mcgrath MD  05/15/23 0511       Tana Han NP  07/17/23 1211

## 2023-06-06 ENCOUNTER — TELEPHONE (OUTPATIENT)
Dept: FAMILY MEDICINE | Facility: CLINIC | Age: 63
End: 2023-06-06

## 2023-06-06 ENCOUNTER — OFFICE VISIT (OUTPATIENT)
Dept: FAMILY MEDICINE | Facility: CLINIC | Age: 63
End: 2023-06-06
Attending: FAMILY MEDICINE
Payer: MEDICARE

## 2023-06-06 VITALS
WEIGHT: 293 LBS | BODY MASS INDEX: 44.41 KG/M2 | DIASTOLIC BLOOD PRESSURE: 66 MMHG | HEART RATE: 82 BPM | HEIGHT: 68 IN | OXYGEN SATURATION: 98 % | SYSTOLIC BLOOD PRESSURE: 108 MMHG

## 2023-06-06 DIAGNOSIS — I10 ESSENTIAL HYPERTENSION: ICD-10-CM

## 2023-06-06 DIAGNOSIS — E11.9 TYPE 2 DIABETES MELLITUS WITHOUT COMPLICATION, WITHOUT LONG-TERM CURRENT USE OF INSULIN: ICD-10-CM

## 2023-06-06 DIAGNOSIS — N39.46 MIXED STRESS AND URGE URINARY INCONTINENCE: ICD-10-CM

## 2023-06-06 DIAGNOSIS — K59.09 CHRONIC CONSTIPATION: ICD-10-CM

## 2023-06-06 DIAGNOSIS — M25.561 ACUTE PAIN OF RIGHT KNEE: Primary | ICD-10-CM

## 2023-06-06 DIAGNOSIS — I71.23 ANEURYSM OF DESCENDING THORACIC AORTA WITHOUT RUPTURE: ICD-10-CM

## 2023-06-06 PROCEDURE — 3078F PR MOST RECENT DIASTOLIC BLOOD PRESSURE < 80 MM HG: ICD-10-PCS | Mod: CPTII,S$GLB,, | Performed by: FAMILY MEDICINE

## 2023-06-06 PROCEDURE — 3044F HG A1C LEVEL LT 7.0%: CPT | Mod: CPTII,S$GLB,, | Performed by: FAMILY MEDICINE

## 2023-06-06 PROCEDURE — 99214 PR OFFICE/OUTPT VISIT, EST, LEVL IV, 30-39 MIN: ICD-10-PCS | Mod: S$GLB,,, | Performed by: FAMILY MEDICINE

## 2023-06-06 PROCEDURE — 3066F NEPHROPATHY DOC TX: CPT | Mod: CPTII,S$GLB,, | Performed by: FAMILY MEDICINE

## 2023-06-06 PROCEDURE — 3074F SYST BP LT 130 MM HG: CPT | Mod: CPTII,S$GLB,, | Performed by: FAMILY MEDICINE

## 2023-06-06 PROCEDURE — 3074F PR MOST RECENT SYSTOLIC BLOOD PRESSURE < 130 MM HG: ICD-10-PCS | Mod: CPTII,S$GLB,, | Performed by: FAMILY MEDICINE

## 2023-06-06 PROCEDURE — 4010F PR ACE/ARB THEARPY RXD/TAKEN: ICD-10-PCS | Mod: CPTII,S$GLB,, | Performed by: FAMILY MEDICINE

## 2023-06-06 PROCEDURE — 3066F PR DOCUMENTATION OF TREATMENT FOR NEPHROPATHY: ICD-10-PCS | Mod: CPTII,S$GLB,, | Performed by: FAMILY MEDICINE

## 2023-06-06 PROCEDURE — 3008F BODY MASS INDEX DOCD: CPT | Mod: CPTII,S$GLB,, | Performed by: FAMILY MEDICINE

## 2023-06-06 PROCEDURE — 4010F ACE/ARB THERAPY RXD/TAKEN: CPT | Mod: CPTII,S$GLB,, | Performed by: FAMILY MEDICINE

## 2023-06-06 PROCEDURE — 1159F MED LIST DOCD IN RCRD: CPT | Mod: CPTII,S$GLB,, | Performed by: FAMILY MEDICINE

## 2023-06-06 PROCEDURE — 3044F PR MOST RECENT HEMOGLOBIN A1C LEVEL <7.0%: ICD-10-PCS | Mod: CPTII,S$GLB,, | Performed by: FAMILY MEDICINE

## 2023-06-06 PROCEDURE — 3061F PR NEG MICROALBUMINURIA RESULT DOCUMENTED/REVIEW: ICD-10-PCS | Mod: CPTII,S$GLB,, | Performed by: FAMILY MEDICINE

## 2023-06-06 PROCEDURE — 99214 OFFICE O/P EST MOD 30 MIN: CPT | Mod: S$GLB,,, | Performed by: FAMILY MEDICINE

## 2023-06-06 PROCEDURE — 3078F DIAST BP <80 MM HG: CPT | Mod: CPTII,S$GLB,, | Performed by: FAMILY MEDICINE

## 2023-06-06 PROCEDURE — 3008F PR BODY MASS INDEX (BMI) DOCUMENTED: ICD-10-PCS | Mod: CPTII,S$GLB,, | Performed by: FAMILY MEDICINE

## 2023-06-06 PROCEDURE — 1159F PR MEDICATION LIST DOCUMENTED IN MEDICAL RECORD: ICD-10-PCS | Mod: CPTII,S$GLB,, | Performed by: FAMILY MEDICINE

## 2023-06-06 PROCEDURE — 3061F NEG MICROALBUMINURIA REV: CPT | Mod: CPTII,S$GLB,, | Performed by: FAMILY MEDICINE

## 2023-06-06 RX ORDER — SOLIFENACIN SUCCINATE 10 MG/1
10 TABLET, FILM COATED ORAL DAILY
Qty: 90 TABLET | Refills: 1 | Status: SHIPPED | OUTPATIENT
Start: 2023-06-06 | End: 2024-06-05

## 2023-06-06 RX ORDER — CARVEDILOL 3.12 MG/1
3.12 TABLET ORAL 2 TIMES DAILY WITH MEALS
Qty: 180 TABLET | Refills: 1 | Status: SHIPPED | OUTPATIENT
Start: 2023-06-06 | End: 2023-08-29

## 2023-06-06 RX ORDER — LISINOPRIL 2.5 MG/1
2.5 TABLET ORAL DAILY
Qty: 90 TABLET | Refills: 1 | Status: SHIPPED | OUTPATIENT
Start: 2023-06-06 | End: 2023-11-29 | Stop reason: SDUPTHER

## 2023-06-06 RX ORDER — SEMAGLUTIDE 0.68 MG/ML
0.5 INJECTION, SOLUTION SUBCUTANEOUS
Qty: 3 ML | Refills: 1 | Status: SHIPPED | OUTPATIENT
Start: 2023-06-06 | End: 2023-08-11 | Stop reason: SDUPTHER

## 2023-06-06 RX ORDER — DAPAGLIFLOZIN 10 MG/1
10 TABLET, FILM COATED ORAL DAILY
Qty: 90 TABLET | Refills: 1 | Status: CANCELLED | OUTPATIENT
Start: 2023-06-06

## 2023-06-06 NOTE — PROGRESS NOTES
SUBJECTIVE:    Patient ID: Martha Miguel is a 62 y.o. female.    Chief Complaint: right leg pain (Right leg pain x 1 mo;went to  3 weeks ago rec'd antibiotics/dog bite 12/12/2022/had tetanus shot prior to bite/ muscle spasms in neck/whole body aching//mp)    Patient presented to the ER on 5/13/.23 with a chief complaint of right knee pain and right calf pain . Patient states she was getting in the tub heard something pop behind her knee.  Patient states she is unable to walk on affected leg.  She reports no swelling.  In the ER had x-ray of the knee that showed no significant abnormalities.  Right lower extremity ultrasound demonstrated no DVTs.  Patient still has pain in leg.  It is not painful to touch.  Pain is present when she rises from a seated position and when she walks.  Patient does have lumbar degenerative disc disease and has been having some additional back and neck pain.  She has used muscle relaxers which was helpful.  The muscle relaxers unfortunately did not help her leg.    Has been haivng issues with constipation. Has BM once a week. Using otc probiotics with benefit of softer BM.     Vesicare helps but still with frequency . She is on farxiga for DM as well     Telephone on 02/06/2023   Component Date Value Ref Range Status    WBC 02/08/2023 4.6  3.8 - 10.8 Thousand/uL Final    RBC 02/08/2023 4.48  3.80 - 5.10 Million/uL Final    Hemoglobin 02/08/2023 13.5  11.7 - 15.5 g/dL Final    Hematocrit 02/08/2023 41.3  35.0 - 45.0 % Final    MCV 02/08/2023 92.2  80.0 - 100.0 fL Final    MCH 02/08/2023 30.1  27.0 - 33.0 pg Final    MCHC 02/08/2023 32.7  32.0 - 36.0 g/dL Final    RDW 02/08/2023 12.6  11.0 - 15.0 % Final    Platelets 02/08/2023 299  140 - 400 Thousand/uL Final    MPV 02/08/2023 10.7  7.5 - 12.5 fL Final    Neutrophils, Abs 02/08/2023 1,803  1,500 - 7,800 cells/uL Final    Lymph # 02/08/2023 2,236  850 - 3,900 cells/uL Final    Mono # 02/08/2023 391  200 - 950 cells/uL Final    Eos #  02/08/2023 152  15 - 500 cells/uL Final    Baso # 02/08/2023 18  0 - 200 cells/uL Final    Neutrophils Relative 02/08/2023 39.2  % Final    Lymph % 02/08/2023 48.6  % Final    Mono % 02/08/2023 8.5  % Final    Eosinophil % 02/08/2023 3.3  % Final    Basophil % 02/08/2023 0.4  % Final    Glucose 02/08/2023 106 (H)  65 - 99 mg/dL Final    BUN 02/08/2023 14  7 - 25 mg/dL Final    Creatinine 02/08/2023 0.86  0.50 - 1.05 mg/dL Final    eGFR 02/08/2023 76  > OR = 60 mL/min/1.73m2 Final    BUN/Creatinine Ratio 02/08/2023 NOT APPLICABLE  6 - 22 (calc) Final    Sodium 02/08/2023 142  135 - 146 mmol/L Final    Potassium 02/08/2023 4.8  3.5 - 5.3 mmol/L Final    Chloride 02/08/2023 105  98 - 110 mmol/L Final    CO2 02/08/2023 29  20 - 32 mmol/L Final    Calcium 02/08/2023 9.6  8.6 - 10.4 mg/dL Final    Total Protein 02/08/2023 6.6  6.1 - 8.1 g/dL Final    Albumin 02/08/2023 3.9  3.6 - 5.1 g/dL Final    Globulin, Total 02/08/2023 2.7  1.9 - 3.7 g/dL (calc) Final    Albumin/Globulin Ratio 02/08/2023 1.4  1.0 - 2.5 (calc) Final    Total Bilirubin 02/08/2023 0.5  0.2 - 1.2 mg/dL Final    Alkaline Phosphatase 02/08/2023 54  37 - 153 U/L Final    AST 02/08/2023 10  10 - 35 U/L Final    ALT 02/08/2023 13  6 - 29 U/L Final    Cholesterol 02/08/2023 130  <200 mg/dL Final    HDL 02/08/2023 51  > OR = 50 mg/dL Final    Triglycerides 02/08/2023 93  <150 mg/dL Final    LDL Cholesterol 02/08/2023 61  mg/dL (calc) Final    HDL/Cholesterol Ratio 02/08/2023 2.5  <5.0 (calc) Final    Non HDL Chol. (LDL+VLDL) 02/08/2023 79  <130 mg/dL (calc) Final    TSH w/reflex to FT4 02/08/2023 1.64  0.40 - 4.50 mIU/L Final    Color, UA 02/08/2023 YELLOW  YELLOW Final    Appearance, UA 02/08/2023 CLEAR  CLEAR Final    Specific Gravity, UA 02/08/2023 1.025  1.001 - 1.035 Final    pH, UA 02/08/2023 < OR = 5.0  5.0 - 8.0 Final    Glucose, UA 02/08/2023 NEGATIVE  NEGATIVE Final    Bilirubin, UA 02/08/2023 NEGATIVE  NEGATIVE Final    Ketones, UA 02/08/2023  NEGATIVE  NEGATIVE Final    Occult Blood UA 02/08/2023 NEGATIVE  NEGATIVE Final    Protein, UA 02/08/2023 NEGATIVE  NEGATIVE Final    Nitrite, UA 02/08/2023 NEGATIVE  NEGATIVE Final    Leukocytes, UA 02/08/2023 NEGATIVE  NEGATIVE Final    WBC Casts, UA 02/08/2023 NONE SEEN  < OR = 5 /HPF Final    RBC Casts, UA 02/08/2023 NONE SEEN  < OR = 2 /HPF Final    Squam Epithel, UA 02/08/2023 0-5  < OR = 5 /HPF Final    Bacteria, UA 02/08/2023 NONE SEEN  NONE SEEN /HPF Final    Hyaline Casts, UA 02/08/2023 NONE SEEN  NONE SEEN /LPF Final    Service Cmt: 02/08/2023    Final    Reflexive Urine Culture 02/08/2023    Final    Hemoglobin A1C 02/08/2023 6.4 (H)  <5.7 % of total Hgb Final    Creatinine, Urine 02/08/2023 173  20 - 275 mg/dL Final    Microalb, Ur 02/08/2023 0.2  See Note: mg/dL Final    Microalb/Creat Ratio 02/08/2023 1  <30 mcg/mg creat Final        Past Medical History:   Diagnosis Date    Abnormal heart rhythm     Allergy     Arthritis     COPD (chronic obstructive pulmonary disease)     Coronary artery disease     Depression     Diabetes mellitus, type 2     Essential hypertension 8/16/2016    Right sided sciatica 4/23/2018     Past Surgical History:   Procedure Laterality Date    CHOLECYSTECTOMY      FRACTURE SURGERY      HYSTERECTOMY      INJECTION OF ANESTHETIC AGENT AROUND MEDIAL BRANCH NERVES INNERVATING LUMBAR FACET JOINT Bilateral 7/5/2018    Procedure: Block-nerve-medial branch-lumbar;  Surgeon: Arthur Orellana MD;  Location: [facility];  Service: Pain Management;  Laterality: Bilateral;  L2, 3, 4, 5    TONSILLECTOMY       Family History   Family history unknown: Yes       Marital Status:   Alcohol History:  reports no history of alcohol use.  Tobacco History:  reports that she has never smoked. She has never used smokeless tobacco.  Drug History:  reports no history of drug use.    Review of patient's allergies indicates:   Allergen Reactions    Phenytoin Hives    Dilantin [phenytoin sodium extended]  Rash    Lovenox [enoxaparin] Rash and Hives       Current Outpatient Medications:     aspirin (ECOTRIN) 81 MG EC tablet, Take 81 mg by mouth once daily., Disp: , Rfl:     atorvastatin (LIPITOR) 10 MG tablet, Take 1 tablet (10 mg total) by mouth once daily., Disp: 90 tablet, Rfl: 3    blood sugar diagnostic Strp, Test glucose twice daily, Disp: 200 each, Rfl: 3    blood-glucose meter kit, Use as instructed, Disp: 1 each, Rfl: 0    L. acidophilus/L. rhamnosus (DIGESTIVE HEALTH PROBIOTIC ORAL), Take 1 tablet by mouth once daily., Disp: , Rfl:     lancets 32 gauge Misc, Test glucose twice daily, Disp: 200 each, Rfl: 3    carvediloL (COREG) 3.125 MG tablet, Take 1 tablet (3.125 mg total) by mouth 2 (two) times daily with meals., Disp: 180 tablet, Rfl: 1    linaCLOtide (LINZESS) 72 mcg Cap capsule, Take 1 capsule (72 mcg total) by mouth before breakfast. For constipation, Disp: 90 capsule, Rfl: 1    lisinopriL (PRINIVIL,ZESTRIL) 2.5 MG tablet, Take 1 tablet (2.5 mg total) by mouth once daily., Disp: 90 tablet, Rfl: 1    semaglutide (OZEMPIC) 0.25 mg or 0.5 mg (2 mg/3 mL) pen injector, Inject 0.5 mg into the skin every 7 days., Disp: 3 mL, Rfl: 1    solifenacin (VESICARE) 10 MG tablet, Take 1 tablet (10 mg total) by mouth once daily. For bladder, Disp: 90 tablet, Rfl: 1    Review of Systems   Constitutional:  Negative for activity change, fatigue and unexpected weight change.   HENT:  Negative for hearing loss, postnasal drip, sinus pressure, sore throat and voice change.    Eyes:  Negative for photophobia and visual disturbance.   Respiratory:  Negative for cough, shortness of breath and wheezing.    Cardiovascular:  Negative for chest pain and palpitations.   Gastrointestinal:  Positive for constipation. Negative for diarrhea and nausea.   Genitourinary:  Positive for frequency. Negative for difficulty urinating, hematuria and urgency.   Musculoskeletal:  Positive for arthralgias, back pain and gait problem.   Skin:   "Negative for rash.   Neurological:  Negative for weakness, light-headedness and headaches.   Hematological:  Negative for adenopathy. Does not bruise/bleed easily.   Psychiatric/Behavioral:  The patient is not nervous/anxious.         Objective:      Vitals:    06/06/23 1436   BP: 108/66   Pulse: 82   SpO2: 98%   Weight: (!) 152.5 kg (336 lb 3.2 oz)   Height: 5' 7.72" (1.72 m)     Physical Exam  Constitutional:       Appearance: Normal appearance. She is obese.   HENT:      Head: Normocephalic and atraumatic.      Mouth/Throat:      Mouth: Mucous membranes are moist.   Eyes:      Conjunctiva/sclera: Conjunctivae normal.   Pulmonary:      Effort: Pulmonary effort is normal.   Musculoskeletal:      Comments: Antalgic gait   Neurological:      General: No focal deficit present.      Mental Status: She is alert and oriented to person, place, and time.   Psychiatric:         Mood and Affect: Mood normal.         Behavior: Behavior normal.         Assessment:       1. Acute pain of right knee    2. Type 2 diabetes mellitus without complication, without long-term current use of insulin    3. Mixed stress and urge urinary incontinence    4. Essential hypertension    5. Chronic constipation    6. Aneurysm of descending thoracic aorta without rupture         Plan:       Acute pain of right knee  -     Ambulatory referral/consult to Orthopedics; Future; Expected date: 06/13/2023    Type 2 diabetes mellitus without complication, without long-term current use of insulin  -     lisinopriL (PRINIVIL,ZESTRIL) 2.5 MG tablet; Take 1 tablet (2.5 mg total) by mouth once daily.  Dispense: 90 tablet; Refill: 1  -     semaglutide (OZEMPIC) 0.25 mg or 0.5 mg (2 mg/3 mL) pen injector; Inject 0.5 mg into the skin every 7 days.  Dispense: 3 mL; Refill: 1    Mixed stress and urge urinary incontinence  -     solifenacin (VESICARE) 10 MG tablet; Take 1 tablet (10 mg total) by mouth once daily. For bladder  Dispense: 90 tablet; Refill: " 1    Essential hypertension  -     carvediloL (COREG) 3.125 MG tablet; Take 1 tablet (3.125 mg total) by mouth 2 (two) times daily with meals.  Dispense: 180 tablet; Refill: 1    Chronic constipation  -     linaCLOtide (LINZESS) 72 mcg Cap capsule; Take 1 capsule (72 mcg total) by mouth before breakfast. For constipation  Dispense: 90 capsule; Refill: 1    Aneurysm of descending thoracic aorta without rupture  -     carvediloL (COREG) 3.125 MG tablet; Take 1 tablet (3.125 mg total) by mouth 2 (two) times daily with meals.  Dispense: 180 tablet; Refill: 1      Follow up keep current.

## 2023-06-06 NOTE — TELEPHONE ENCOUNTER
----- Message from Milly Ramos sent at 6/6/2023  8:17 AM CDT -----  Pt is having pain in her legs and different things, she had some changes made to her medications and she is gaining weight. She would like to be seen before her august appt.   [phone]

## 2023-06-12 ENCOUNTER — OFFICE VISIT (OUTPATIENT)
Dept: ORTHOPEDICS | Facility: CLINIC | Age: 63
End: 2023-06-12
Payer: MEDICARE

## 2023-06-12 VITALS
DIASTOLIC BLOOD PRESSURE: 66 MMHG | HEIGHT: 68 IN | WEIGHT: 293 LBS | BODY MASS INDEX: 44.41 KG/M2 | SYSTOLIC BLOOD PRESSURE: 108 MMHG

## 2023-06-12 DIAGNOSIS — S83.241A OTHER TEAR OF MEDIAL MENISCUS OF RIGHT KNEE AS CURRENT INJURY, INITIAL ENCOUNTER: Primary | ICD-10-CM

## 2023-06-12 PROCEDURE — 1160F RVW MEDS BY RX/DR IN RCRD: CPT | Mod: CPTII,S$GLB,, | Performed by: PHYSICIAN ASSISTANT

## 2023-06-12 PROCEDURE — 3061F PR NEG MICROALBUMINURIA RESULT DOCUMENTED/REVIEW: ICD-10-PCS | Mod: CPTII,S$GLB,, | Performed by: PHYSICIAN ASSISTANT

## 2023-06-12 PROCEDURE — 4010F ACE/ARB THERAPY RXD/TAKEN: CPT | Mod: CPTII,S$GLB,, | Performed by: PHYSICIAN ASSISTANT

## 2023-06-12 PROCEDURE — 3066F NEPHROPATHY DOC TX: CPT | Mod: CPTII,S$GLB,, | Performed by: PHYSICIAN ASSISTANT

## 2023-06-12 PROCEDURE — 3066F PR DOCUMENTATION OF TREATMENT FOR NEPHROPATHY: ICD-10-PCS | Mod: CPTII,S$GLB,, | Performed by: PHYSICIAN ASSISTANT

## 2023-06-12 PROCEDURE — 3074F PR MOST RECENT SYSTOLIC BLOOD PRESSURE < 130 MM HG: ICD-10-PCS | Mod: CPTII,S$GLB,, | Performed by: PHYSICIAN ASSISTANT

## 2023-06-12 PROCEDURE — 99203 PR OFFICE/OUTPT VISIT, NEW, LEVL III, 30-44 MIN: ICD-10-PCS | Mod: 25,S$GLB,, | Performed by: PHYSICIAN ASSISTANT

## 2023-06-12 PROCEDURE — 99203 OFFICE O/P NEW LOW 30 MIN: CPT | Mod: 25,S$GLB,, | Performed by: PHYSICIAN ASSISTANT

## 2023-06-12 PROCEDURE — 20610 DRAIN/INJ JOINT/BURSA W/O US: CPT | Mod: RT,S$GLB,, | Performed by: PHYSICIAN ASSISTANT

## 2023-06-12 PROCEDURE — 1159F MED LIST DOCD IN RCRD: CPT | Mod: CPTII,S$GLB,, | Performed by: PHYSICIAN ASSISTANT

## 2023-06-12 PROCEDURE — 3008F PR BODY MASS INDEX (BMI) DOCUMENTED: ICD-10-PCS | Mod: CPTII,S$GLB,, | Performed by: PHYSICIAN ASSISTANT

## 2023-06-12 PROCEDURE — 1160F PR REVIEW ALL MEDS BY PRESCRIBER/CLIN PHARMACIST DOCUMENTED: ICD-10-PCS | Mod: CPTII,S$GLB,, | Performed by: PHYSICIAN ASSISTANT

## 2023-06-12 PROCEDURE — 3044F PR MOST RECENT HEMOGLOBIN A1C LEVEL <7.0%: ICD-10-PCS | Mod: CPTII,S$GLB,, | Performed by: PHYSICIAN ASSISTANT

## 2023-06-12 PROCEDURE — 4010F PR ACE/ARB THEARPY RXD/TAKEN: ICD-10-PCS | Mod: CPTII,S$GLB,, | Performed by: PHYSICIAN ASSISTANT

## 2023-06-12 PROCEDURE — 3078F PR MOST RECENT DIASTOLIC BLOOD PRESSURE < 80 MM HG: ICD-10-PCS | Mod: CPTII,S$GLB,, | Performed by: PHYSICIAN ASSISTANT

## 2023-06-12 PROCEDURE — 3008F BODY MASS INDEX DOCD: CPT | Mod: CPTII,S$GLB,, | Performed by: PHYSICIAN ASSISTANT

## 2023-06-12 PROCEDURE — 3074F SYST BP LT 130 MM HG: CPT | Mod: CPTII,S$GLB,, | Performed by: PHYSICIAN ASSISTANT

## 2023-06-12 PROCEDURE — 3044F HG A1C LEVEL LT 7.0%: CPT | Mod: CPTII,S$GLB,, | Performed by: PHYSICIAN ASSISTANT

## 2023-06-12 PROCEDURE — 20610 LARGE JOINT ASPIRATION/INJECTION: R KNEE: ICD-10-PCS | Mod: RT,S$GLB,, | Performed by: PHYSICIAN ASSISTANT

## 2023-06-12 PROCEDURE — 3078F DIAST BP <80 MM HG: CPT | Mod: CPTII,S$GLB,, | Performed by: PHYSICIAN ASSISTANT

## 2023-06-12 PROCEDURE — 1159F PR MEDICATION LIST DOCUMENTED IN MEDICAL RECORD: ICD-10-PCS | Mod: CPTII,S$GLB,, | Performed by: PHYSICIAN ASSISTANT

## 2023-06-12 PROCEDURE — 3061F NEG MICROALBUMINURIA REV: CPT | Mod: CPTII,S$GLB,, | Performed by: PHYSICIAN ASSISTANT

## 2023-06-12 RX ORDER — TRIAMCINOLONE ACETONIDE 40 MG/ML
40 INJECTION, SUSPENSION INTRA-ARTICULAR; INTRAMUSCULAR
Status: DISCONTINUED | OUTPATIENT
Start: 2023-06-12 | End: 2023-06-12

## 2023-06-12 RX ORDER — BLOOD-GLUCOSE METER
EACH MISCELLANEOUS
COMMUNITY
Start: 2023-02-16 | End: 2023-06-12 | Stop reason: SDUPTHER

## 2023-06-12 RX ORDER — MELOXICAM 15 MG/1
15 TABLET ORAL DAILY
Qty: 30 TABLET | Refills: 2 | Status: SHIPPED | OUTPATIENT
Start: 2023-06-12

## 2023-06-12 RX ADMIN — TRIAMCINOLONE ACETONIDE 40 MG: 40 INJECTION, SUSPENSION INTRA-ARTICULAR; INTRAMUSCULAR at 09:06

## 2023-06-12 NOTE — PROGRESS NOTES
[facility]  [address]  [place] [address]  Phone: [phone]   [phone]    Patient's PCP: Katya Hummel MD  Referring Provider: Dr. Katya Hummel    Subjective:      Chief Complaint:   Chief Complaint   Patient presents with    Right Knee - Pain     Patient is here with complaints of Right knee pain x 1 month, was getting out the tube and heard a pop. Weak but hasn't given away. Keeps her up at night.        Past Medical History:   Diagnosis Date    Abnormal heart rhythm     Allergy     Aneurysm of abdominal vessel 2020    Arthritis     COPD (chronic obstructive pulmonary disease)     Coronary artery disease     Depression     Diabetes mellitus, type 2     Essential hypertension 08/16/2016    Right sided sciatica 04/23/2018       Past Surgical History:   Procedure Laterality Date    CHOLECYSTECTOMY      FRACTURE SURGERY      HYSTERECTOMY      INJECTION OF ANESTHETIC AGENT AROUND MEDIAL BRANCH NERVES INNERVATING LUMBAR FACET JOINT Bilateral 7/5/2018    Procedure: Block-nerve-medial branch-lumbar;  Surgeon: Arthur Orellana MD;  Location: [facility];  Service: Pain Management;  Laterality: Bilateral;  L2, 3, 4, 5    TONSILLECTOMY         Current Outpatient Medications   Medication Sig    aspirin (ECOTRIN) 81 MG EC tablet Take 81 mg by mouth once daily.    atorvastatin (LIPITOR) 10 MG tablet Take 1 tablet (10 mg total) by mouth once daily.    blood sugar diagnostic Strp Test glucose twice daily    blood-glucose meter kit Use as instructed    carvediloL (COREG) 3.125 MG tablet Take 1 tablet (3.125 mg total) by mouth 2 (two) times daily with meals.    L. acidophilus/L. rhamnosus (DIGESTIVE HEALTH PROBIOTIC ORAL) Take 1 tablet by mouth once daily.    lancets 32 gauge Misc Test glucose twice daily    linaCLOtide (LINZESS) 72 mcg Cap capsule Take 1 capsule (72 mcg total) by mouth before breakfast. For constipation    lisinopriL (PRINIVIL,ZESTRIL) 2.5 MG tablet Take 1 tablet (2.5 mg  total) by mouth once daily.    semaglutide (OZEMPIC) 0.25 mg or 0.5 mg (2 mg/3 mL) pen injector Inject 0.5 mg into the skin every 7 days.    solifenacin (VESICARE) 10 MG tablet Take 1 tablet (10 mg total) by mouth once daily. For bladder    meloxicam (MOBIC) 15 MG tablet Take 1 tablet (15 mg total) by mouth once daily.     No current facility-administered medications for this visit.       Review of patient's allergies indicates:   Allergen Reactions    Phenytoin Hives    Dilantin [phenytoin sodium extended] Rash    Lovenox [enoxaparin] Rash and Hives       Family History   Family history unknown: Yes       Social History     Socioeconomic History    Marital status:    Tobacco Use    Smoking status: Never    Smokeless tobacco: Never   Substance and Sexual Activity    Alcohol use: No    Drug use: No    Sexual activity: Never       History of present illness: Ms. Thomas presents to the clinic today as a new patient chief complaint of right knee pain that began acutely about 1 month ago.  She was arising from a seated position in her bathtub and felt a pop in her knee.  She is had pain ever since.  She is tried activity modification and anti-inflammatories without much relief of her symptoms.    Review of Systems:    Constitutional: Negative for chills, fever and weight loss.   HENT: Negative for congestion.    Eyes: Negative for discharge and redness.   Respiratory: Negative for cough and shortness of breath.    Cardiovascular: Negative for chest pain.   Gastrointestinal: Negative for nausea and vomiting.   Musculoskeletal: See HPI.   Skin: Negative for rash.   Neurological: Negative for headaches.   Endo/Heme/Allergies: Does not bruise/bleed easily.   Psychiatric/Behavioral: The patient is not nervous/anxious.    All other systems reviewed and are negative.       Objective:      Physical Examination:    Vital Signs:    Vitals:    06/12/23 0922   BP: 108/66       Body mass index is 50.75 kg/m².    This a  well-developed, well nourished patient in no acute distress.  They are alert and oriented and cooperative to examination.     Right knee exam:  Skin to right knee is clean dry and intact.  There is no erythema or ecchymosis.  There are no signs or symptoms of infection.  Patient is neurovascularly intact throughout the right lower extremity.  Right calf is soft and nontender.  Right knee active range of motion 0-100 degrees.  It is stable to varus and valgus stresses.  She has no real marked medial or lateral joint line tenderness.  There is no effusion.  She can weightbear as tolerated on her right lower extremity.  She has a negative straight leg raise on the right.    Pertinent New Results:        XRAY Report / Interpretation:   No new radiographs were taken in our clinic today.  However, did review images taken at an outside clinic.  Did not reveal any acute fractures or dislocations.  Visualized soft tissues appear unremarkable.  Images are not weight-bearing, however, joint space is well-preserved in both the medial and lateral compartments.      Assessment:       1. Other tear of medial meniscus of right knee as current injury, initial encounter      Plan:     Other tear of medial meniscus of right knee as current injury, initial encounter  -     Ambulatory referral/consult to Orthopedics  -     meloxicam (MOBIC) 15 MG tablet; Take 1 tablet (15 mg total) by mouth once daily.  Dispense: 30 tablet; Refill: 2  -     Ambulatory referral/consult to Physical/Occupational Therapy; Future; Expected date: 06/19/2023        Follow up in about 6 weeks (around 7/24/2023) for Injec. f/up 6/12/23, PT.    I injected her right knee today via an anterior lateral approach with 40 mg of Kenalog and lidocaine.  She tolerated this well.  We will also start her on Mobic 15 mg by mouth once a day with food.  We will get her started with physical therapy for range of motion and strengthening exercises.  We will see her back in 6  weeks see how she is responding to these treatment modalities.  If she is not much improved, consideration of advanced imaging with an MRI to evaluate for internal derangement may be warranted.        REGINALDO Caceres, PA-C    This note was created using Medium voice recognition software that occasionally misinterprets words or phrases.

## 2023-06-12 NOTE — PROCEDURES
Large Joint Aspiration/Injection: R knee    Date/Time: 6/12/2023 9:15 AM  Performed by: Constantino Sargent PA-C  Authorized by: Constantino Sargent PA-C     Consent Done?:  Yes (Verbal)  Indications:  Arthritis and pain  Site marked: the procedure site was marked    Timeout: prior to procedure the correct patient, procedure, and site was verified    Prep: patient was prepped and draped in usual sterile fashion      Local anesthesia used?: Yes    Local anesthetic:  Lidocaine 1% without epinephrine    Details:  Needle Size:  25 G  Ultrasonic Guidance for needle placement?: No    Location:  Knee  Site:  R knee  Medications:  40 mg triamcinolone acetonide 40 mg/mL  Patient tolerance:  Patient tolerated the procedure well with no immediate complications

## 2023-06-13 NOTE — PROGRESS NOTES
"  [facility]   Physical Therapy Initial Evaluation      Name: Martha Miguel  Clinic Number: [id number]    Therapy Diagnosis:   Encounter Diagnoses   Name Primary?    Other tear of medial meniscus of right knee as current injury, initial encounter     Weakness of both lower extremities     Decreased range of motion (ROM) of right knee     Acute pain of right knee Yes        Physician: Constantino Sargent, *    Physician Orders: PT Eval and Treat   Medical Diagnosis from Referral: S83.241A (ICD-10-CM) - Other tear of medial meniscus of right knee as current injury, initial encounter  Evaluation Date: 6/14/2023  Authorization Period Expiration: 12/31/2023  Plan of Care Expiration: 08/09/2023  Progress Note Due: 7/14/2023  Visit # / Visits authorized: 1/ 1   FOTO: 1/1    Precautions:  thoracic aortic aneurysm without rupture (9/29/2016)      Time In: 10:05 am  Time Out: 11:10 am   Total Appointment Time (timed & untimed codes): 65 minutes    Subjective     Date of onset: "about a month ago"    History of current condition - Martha reports: she heard a pop in the right knee when getting out of her tub and has had issues ever since. She got an injection in the knee on Monday that has helped a lot. She is sleeping in the recliner currently. However, she has been doing since prior to knee injury because her entire body "can't get comfortable" . Pt states she has  a history of an abdominal aneurysm that her MD (Dr. Hummel) states has not ruptured. She has arthritis that effects mostly her right hip and left leg. She had surgery in her L ankle after breaking it in 1985 that still bothers her sometimes. Pt states she also has sleep apnea. Pt was able to water her plants yesterday but had pain despite the shot. Her diabetes and blood pressure are both currently being controlled with medication.     Falls: years ago, none recent     Imaging: X-ray (5/13/23):  "TECHNIQUE: 3 AP, lateral, and oblique " "radiographic views of the right knee     FINDINGS:  No evidence of acute displaced fracture or dislocation. Joint spaces are grossly preserved without evidence of significant degenerative changes. No significant joint effusion is definitely seen. Soft tissues appear grossly unremarkable.     IMPRESSION: No acute displaced osseous abnormality is visualized."    Prior Therapy: therapy "a long time ago" for her ankle and back pain  Social History:  son lives with her  Occupation: retired   Prior Level of Function: independent   Current Level of Function: independent when she feels okay    Pain:  Current 0/10, worst 9/10, best 0/10   Location: right knee    Description: Deep  Aggravating Factors: Sitting and knee flexion mostly. Sometimes with extension.   Easing Factors: relaxation, does not like to take pain medication. Will sometimes take Advil. Tried to get into a Jacuzzi to help with no relief.     Patients goals: decrease pain      Medical History:   Past Medical History:   Diagnosis Date    Abnormal heart rhythm     Allergy     Aneurysm of abdominal vessel 2020    Arthritis     COPD (chronic obstructive pulmonary disease)     Coronary artery disease     Depression     Diabetes mellitus, type 2     Essential hypertension 08/16/2016    Right sided sciatica 04/23/2018       Surgical History:   Martha Miguel  has a past surgical history that includes Cholecystectomy; Fracture surgery; Hysterectomy; Tonsillectomy; and Injection of anesthetic agent around medial branch nerves innervating lumbar facet joint (Bilateral, 7/5/2018).    Medications:   Martha has a current medication list which includes the following prescription(s): aspirin, atorvastatin, blood sugar diagnostic, blood-glucose meter, carvedilol, l. acidophilus/l. rhamnosus, lancets, linaclotide, lisinopril, meloxicam, ozempic, and solifenacin.    Allergies:   Review of patient's allergies indicates:   Allergen Reactions    Phenytoin Hives    Dilantin " [phenytoin sodium extended] Rash    Lovenox [enoxaparin] Rash and Hives        Objective      Observation: gowers sign with initial attempt to stand from chair. However, pt can sit to stand from elevated surface without hands when instructed by therapist. L ankle swollen secondary to surgery and lymphedema.  Bilateral trendelenburg gait evident.     Posture: forward head posture, scapular protraction bilaterally.     Functional tests:   SLS EO: not tested secondary to knee pain    5 x STS: 16 seconds without hands    Range of Motion (Passive):   Knee Flexion Comments   Right 105 active, 115 passive Pain at end range     Range of Motion (Active):   Knee Extension Comments   Right  WNL      Lower Extremity Strength  Right LE  (6/14/2023) Left LE (6/14/2023)   Quadriceps: 5/5 Quadriceps: 5/5   Hamstrings: 4+/5 Hamstrings: 5/5   Hip flexion (supine): 4-/5 Hip flexion (supine): 4+/5   Hip extension:  4-/5 Hip extension: 4+/5   Hip ER:  4/5 Hip ER:  4/5   Hip IR: 4/5 Hip IR: 4/5     Special Tests:   Right Left   Valgus Stress Test negative NT   Varus Stress test negative NT   Lachman's test negative NT   Posterior Lachman negative NT   Preston's Test negative NT   Thessaly's Test positive NT   Apley's compression positive NT     Joint Mobility: restricted . Pain at both end range flexion and extension.    Palpation: joint line tenderness, medial hamstring and gastroc head tender to palpation    Sensation: intact     Flexibility:    Hamstrings: R = WFL ; L = WFL    Adelina's test: R = Not tested ; L = Not tested   Franky test: R = negative ; L = negative   Ely's: R= positive ; L = positive    Edema: swelling on L LE due to lymphedema/ history of ankle fracture.     Limitation/Restriction for FOTO Functional Status Measure Survey    Therapist reviewed FOTO scores for Martha Miguel on 6/14/2023.   FOTO documents entered into [employer] - see Media section.    Limitation Score: 61%         Treatment     Total Treatment time (time-based  "codes) separate from Evaluation: 30 minutes     Martha received the treatments listed below:      therapeutic exercises to develop strength, endurance, ROM, flexibility, posture, and core stabilization for 16 minutes including:    R Calf stretch in long sitting with strap, 3 x 30"   R supine heel slides with strap, 2 x 10 - halted secondary to increased pain  R Quad set with towel under knee, 2 x 10 with 5 second hold    therapy techniques: Joint mobilizations, Manual traction, Myofacial release, Manual Lymphatic Drainage, Soft tissue Mobilization, and Friction Massage were applied for:  for 0 minutes, including:      neuromuscular re-education activities to improve: Balance, Coordination, Kinesthetic, Sense, Proprioception, and Posture for 0 minutes. The following activities were included:    therapeutic activities to improve functional performance for 14 minutes, including:    Standing from standard chair  Standing from high mat  Standing hip abduction bilaterally, 3 x 10  Standing hip extension bilaterally, 3 x 10     gait training to improve functional mobility and safety for 0  minutes, including:      direct contact modalities after being cleared for contraindications:     supervised modalities after being cleared for contradictions:     hot pack for 0 minutes to 0.    cold pack for 0 minutes to 0.    Patient Education and Home Exercises     Education provided:   - Pt educated regarding evaluation findings, potential plan of care and scheduling process.   - Pt educated in regard to compliance with compression sleeve for L LE lymphedema   - Pt educated on partaking in swimming again for offloading of knee jt while progressing activity level  -Pt provided with initial HEP (6/14/2023)    Written Home Exercises Provided: yes. Exercises were reviewed and Martha was able to demonstrate them prior to the end of the session.  Martha demonstrated good  understanding of the education provided. See EMR under Patient " Instructions for exercises provided during therapy sessions.    Assessment     Martha is a 62 y.o. female referred to outpatient Physical Therapy with a medical diagnosis of other tear of medial meniscus of right knee as current injury, initial encounter. Patient presents with right knee range of motion deficits,  bilateral lower extremity weakness, and poor biomechanics when performing functional activities. She demonstrated a 5 x STS in 16 seconds, proving she is at increased risk of falls in comparison to the standardized norms for her age group. Pt tested positive on all mensiscal special tests, indicating impediment in regard to cartilage of right knee. Surrounding ligamentous structures appear strong and intact without any evidence of injury. Mild discomfort noted with tenderness to palpation over medial hamstring and gastroc heads. Trigger points noted especially throughout medial hamstring. However, therapist unable to reproduce any pain when isolating medial hamstring in manual stretching. Pt additionally has chronic low back pain and elevated body mass index that likely contribute to limited particpation in daily activities. Pt is fit for skilled physical therapy addressing strength, mobility, and endurance deficits listed above.      Patient prognosis is Good.   Patient will benefit from skilled outpatient Physical Therapy to address the deficits stated above and in the chart below, provide patient /family education, and to maximize patientt's level of independence.     Plan of care discussed with patient: Yes  Patient's spiritual, cultural and educational needs considered and patient is agreeable to the plan of care and goals as stated below:     Anticipated Barriers for therapy: low back pain     Medical Necessity is demonstrated by the following  History  Co-morbidities and personal factors that may impact the plan of care [] LOW: no personal factors / co-morbidities  [x] MODERATE: 1-2 personal factors  / co-morbidities  [] HIGH: 3+ personal factors / co-morbidities    Moderate / High Support Documentation:   Co-morbidities affecting plan of care: diabetes, thoracic abdominal aneurysm without rupture    Personal Factors:   no deficits     Examination  Body Structures and Functions, activity limitations and participation restrictions that may impact the plan of care [x] LOW: addressing 1-2 elements  [] MODERATE: 3+ elements  [] HIGH: 4+ elements (please support below)    Moderate / High Support Documentation: low back pain, history of left ankle fracture with current swelling/decreased mobility     Clinical Presentation [x] LOW: stable  [] MODERATE: Evolving  [] HIGH: Unstable     Decision Making/ Complexity Score: low       Goals:  Short Term Goals: 4 weeks   Pt will be IND with initial HEP to manage symptoms outside of PT.   Pt will report right knee pain </= 7/10 with sitting down on standardized chair to demonstrate improved condition.   Pt will improve at least 3 MMT of R LE by one grade to improve tolerance for weightbearing/dynamic movements.   Pt will improve passive right knee flexion ROM by >= 3 degrees to improve tolerance for sitting down onto low surfaces.     Long Term Goals: 8 weeks   Pt will improve FOTO score to </= 46% limited to demonstrate improved functional mobility.  Pt will be IND with final HEP to maintain/improve strength and mobility gained in PT.   Pt will report right knee pain </= 4/10 with squatting to demonstrate improved condition.   Pt will perform 5 x STS in </= 12 seconds, demonstrating improved LE strength in functional/dynamic activities.  Pt will improve MMT of bilateral hip IR/ER to >/= 4+/ 5 to improve tolerance for single leg/ lower extremity balance activities.   Pt will improve active right knee ROM = 110 to uninvolved side to improve tolerance for squatting to  objects in functional activities.   Pt goal:  decreased pain and to move more efficiently in/out of  recliner. Pt additionally wants to maintain or improve independence.   Plan     Plan of care Certification: 6/14/2023 to 08/09/2023.    Outpatient Physical Therapy 2 times weekly for 6 weeks to include the following interventions: Cervical/Lumbar Traction, Electrical Stimulation  , Gait Training, Manual Therapy, Moist Heat/ Ice, Neuromuscular Re-ed, Patient Education, Self Care, Therapeutic Activities, and Therapeutic Exercise.     Christa Leyva, PT  , DPT  06/14/2023

## 2023-06-14 ENCOUNTER — CLINICAL SUPPORT (OUTPATIENT)
Dept: REHABILITATION | Facility: HOSPITAL | Age: 63
End: 2023-06-14
Payer: MEDICARE

## 2023-06-14 DIAGNOSIS — R29.898 WEAKNESS OF BOTH LOWER EXTREMITIES: ICD-10-CM

## 2023-06-14 DIAGNOSIS — M25.561 ACUTE PAIN OF RIGHT KNEE: Primary | ICD-10-CM

## 2023-06-14 DIAGNOSIS — M25.661 DECREASED RANGE OF MOTION (ROM) OF RIGHT KNEE: ICD-10-CM

## 2023-06-14 DIAGNOSIS — S83.241A OTHER TEAR OF MEDIAL MENISCUS OF RIGHT KNEE AS CURRENT INJURY, INITIAL ENCOUNTER: ICD-10-CM

## 2023-06-14 PROCEDURE — 97161 PT EVAL LOW COMPLEX 20 MIN: CPT | Mod: PN

## 2023-06-14 PROCEDURE — 97110 THERAPEUTIC EXERCISES: CPT | Mod: PN

## 2023-06-15 NOTE — PLAN OF CARE
"  [facility]   Physical Therapy Initial Evaluation      Name: Martha Miguel  Clinic Number: [id number]    Therapy Diagnosis:   Encounter Diagnoses   Name Primary?    Other tear of medial meniscus of right knee as current injury, initial encounter     Weakness of both lower extremities     Decreased range of motion (ROM) of right knee     Acute pain of right knee Yes        Physician: Constantino Sargent, *    Physician Orders: PT Eval and Treat   Medical Diagnosis from Referral: S83.241A (ICD-10-CM) - Other tear of medial meniscus of right knee as current injury, initial encounter  Evaluation Date: 6/14/2023  Authorization Period Expiration: 12/31/2023  Plan of Care Expiration: 08/09/2023  Progress Note Due: 7/14/2023  Visit # / Visits authorized: 1/ 1   FOTO: 1/1    Precautions: thoracic aortic aneurysm without rupture (9/29/2016)     Time In: 10:05 am  Time Out: 11:10 am   Total Appointment Time (timed & untimed codes): 65 minutes    Subjective     Date of onset: "about a month ago"    History of current condition - Martha reports: she heard a pop in the right knee when getting out of her tub and has had issues ever since. She got an injection in the knee on Monday that has helped a lot. She is sleeping in the recliner currently. However, she has been doing since prior to knee injury because her entire body "can't get comfortable" . Pt states she has  a history of an abdominal aneurysm that her MD (Dr. Hummel) states has not ruptured. She has arthritis that effects mostly her right hip and left leg. She had surgery in her L ankle after breaking it in 1985 that still bothers her sometimes. Pt states she also has sleep apnea. Pt was able to water her plants yesterday but had pain despite the shot. Her diabetes and blood pressure are both currently being controlled with medication.     Falls: years ago, none recent     Imaging: X-ray (5/13/23):  "TECHNIQUE: 3 AP, lateral, and oblique " "radiographic views of the right knee     FINDINGS:  No evidence of acute displaced fracture or dislocation. Joint spaces are grossly preserved without evidence of significant degenerative changes. No significant joint effusion is definitely seen. Soft tissues appear grossly unremarkable.     IMPRESSION: No acute displaced osseous abnormality is visualized."    Prior Therapy: therapy "a long time ago" for her ankle and back pain  Social History:  son lives with her  Occupation: retired   Prior Level of Function: independent   Current Level of Function: independent when she feels okay    Pain:  Current 0/10, worst 9/10, best 0/10   Location: right knee    Description: Deep  Aggravating Factors: Sitting and knee flexion mostly. Sometimes with extension.   Easing Factors: relaxation, does not like to take pain medication. Will sometimes take Advil. Tried to get into a Jacuzzi to help with no relief.     Patients goals: decrease pain      Medical History:   Past Medical History:   Diagnosis Date    Abnormal heart rhythm     Allergy     Aneurysm of abdominal vessel 2020    Arthritis     COPD (chronic obstructive pulmonary disease)     Coronary artery disease     Depression     Diabetes mellitus, type 2     Essential hypertension 08/16/2016    Right sided sciatica 04/23/2018       Surgical History:   Martha Miguel  has a past surgical history that includes Cholecystectomy; Fracture surgery; Hysterectomy; Tonsillectomy; and Injection of anesthetic agent around medial branch nerves innervating lumbar facet joint (Bilateral, 7/5/2018).    Medications:   Martha has a current medication list which includes the following prescription(s): aspirin, atorvastatin, blood sugar diagnostic, blood-glucose meter, carvedilol, l. acidophilus/l. rhamnosus, lancets, linaclotide, lisinopril, meloxicam, ozempic, and solifenacin.    Allergies:   Review of patient's allergies indicates:   Allergen Reactions    Phenytoin Hives    Dilantin " [phenytoin sodium extended] Rash    Lovenox [enoxaparin] Rash and Hives        Objective      Observation: gowers sign with initial attempt to stand from chair. However, pt can sit to stand from elevated surface without hands when instructed by therapist. L ankle swollen secondary to surgery and lymphedema.  Bilateral trendelenburg gait evident.     Posture: forward head posture, scapular protraction bilaterally.     Functional tests:   SLS EO: not tested secondary to knee pain    5 x STS: 16 seconds without hands    Range of Motion (Passive):   Knee Flexion Comments   Right 105 active, 115 passive Pain at end range     Range of Motion (Active):   Knee Extension Comments   Right  WNL      Lower Extremity Strength  Right LE  (6/14/2023) Left LE (6/14/2023)   Quadriceps: 5/5 Quadriceps: 5/5   Hamstrings: 4+/5 Hamstrings: 5/5   Hip flexion (supine): 4-/5 Hip flexion (supine): 4+/5   Hip extension:  4-/5 Hip extension: 4+/5   Hip ER:  4/5 Hip ER:  4/5   Hip IR: 4/5 Hip IR: 4/5     Special Tests:   Right Left   Valgus Stress Test negative NT   Varus Stress test negative NT   Lachman's test negative NT   Posterior Lachman negative NT   Preston's Test negative NT   Thessaly's Test positive NT   Apley's compression positive NT     Joint Mobility: restricted . Pain at both end range flexion and extension.    Palpation: joint line tenderness, medial hamstring and gastroc head tender to palpation    Sensation: intact     Flexibility:    Hamstrings: R = WFL ; L = WFL    Adelina's test: R = Not tested ; L = Not tested   Franky test: R = negative ; L = negative   Ely's: R= positive ; L = positive    Edema: swelling on L LE due to lymphedema/ history of ankle fracture.     Limitation/Restriction for FOTO Functional Status Measure Survey    Therapist reviewed FOTO scores for Martha Miguel on 6/14/2023.   FOTO documents entered into [facility] - see Media section.    Limitation Score: 61%         Treatment     Total Treatment time (time-based  "codes) separate from Evaluation: 30 minutes     Martha received the treatments listed below:      therapeutic exercises to develop strength, endurance, ROM, flexibility, posture, and core stabilization for 16 minutes including:    R Calf stretch in long sitting with strap, 3 x 30"   R supine heel slides with strap, 2 x 10 - halted secondary to increased pain  R Quad set with towel under knee, 2 x 10 with 5 second hold    therapy techniques: Joint mobilizations, Manual traction, Myofacial release, Manual Lymphatic Drainage, Soft tissue Mobilization, and Friction Massage were applied for:  for 0 minutes, including:      neuromuscular re-education activities to improve: Balance, Coordination, Kinesthetic, Sense, Proprioception, and Posture for 0 minutes. The following activities were included:    therapeutic activities to improve functional performance for 14 minutes, including:    Standing from standard chair  Standing from high mat  Standing hip abduction bilaterally, 3 x 10  Standing hip extension bilaterally, 3 x 10     gait training to improve functional mobility and safety for 0  minutes, including:      direct contact modalities after being cleared for contraindications:     supervised modalities after being cleared for contradictions:     hot pack for 0 minutes to 0.    cold pack for 0 minutes to 0.    Patient Education and Home Exercises     Education provided:   - Pt educated regarding evaluation findings, potential plan of care and scheduling process.   - Pt educated in regard to compliance with compression sleeve for L LE lymphedema   - Pt educated on partaking in swimming again for offloading of knee jt while progressing activity level  -Pt provided with initial HEP (6/14/2023)    Written Home Exercises Provided: yes. Exercises were reviewed and Martha was able to demonstrate them prior to the end of the session.  Martha demonstrated good  understanding of the education provided. See EMR under Patient " Instructions for exercises provided during therapy sessions.    Assessment     Martha is a 62 y.o. female referred to outpatient Physical Therapy with a medical diagnosis of other tear of medial meniscus of right knee as current injury, initial encounter. Patient presents with right knee range of motion deficits,  bilateral lower extremity weakness, and poor biomechanics when performing functional activities. She demonstrated a 5 x STS in 16 seconds, proving she is at increased risk of falls in comparison to the standardized norms for her age group. Pt tested positive on all mensiscal special tests, indicating impediment in regard to cartilage of right knee. Surrounding ligamentous structures appear strong and intact without any evidence of injury. Mild discomfort noted with tenderness to palpation over medial hamstring and gastroc heads. Trigger points noted especially throughout medial hamstring. However, therapist unable to reproduce any pain when isolating medial hamstring in manual stretching. Pt additionally has chronic low back pain and elevated body mass index that likely contribute to limited particpation in daily activities. Pt is fit for skilled physical therapy addressing strength, mobility, and endurance deficits listed above.      Patient prognosis is Good.   Patient will benefit from skilled outpatient Physical Therapy to address the deficits stated above and in the chart below, provide patient /family education, and to maximize patientt's level of independence.     Plan of care discussed with patient: Yes  Patient's spiritual, cultural and educational needs considered and patient is agreeable to the plan of care and goals as stated below:     Anticipated Barriers for therapy: low back pain     Medical Necessity is demonstrated by the following  History  Co-morbidities and personal factors that may impact the plan of care [] LOW: no personal factors / co-morbidities  [x] MODERATE: 1-2 personal factors  / co-morbidities  [] HIGH: 3+ personal factors / co-morbidities    Moderate / High Support Documentation:   Co-morbidities affecting plan of care: diabetes, thoracic abdominal aneurysm without rupture    Personal Factors:   no deficits     Examination  Body Structures and Functions, activity limitations and participation restrictions that may impact the plan of care [x] LOW: addressing 1-2 elements  [] MODERATE: 3+ elements  [] HIGH: 4+ elements (please support below)    Moderate / High Support Documentation: low back pain, history of left ankle fracture with current swelling/decreased mobility     Clinical Presentation [x] LOW: stable  [] MODERATE: Evolving  [] HIGH: Unstable     Decision Making/ Complexity Score: low       Goals:  Short Term Goals: 4 weeks   Pt will be IND with initial HEP to manage symptoms outside of PT.   Pt will report right knee pain </= 7/10 with sitting down on standardized chair to demonstrate improved condition.   Pt will improve at least 3 MMT of R LE by one grade to improve tolerance for weightbearing/dynamic movements.   Pt will improve passive right knee flexion ROM by >= 3 degrees to improve tolerance for sitting down onto low surfaces.     Long Term Goals: 8 weeks   Pt will improve FOTO score to </= 46% limited to demonstrate improved functional mobility.  Pt will be IND with final HEP to maintain/improve strength and mobility gained in PT.   Pt will report right knee pain </= 4/10 with squatting to demonstrate improved condition.   Pt will perform 5 x STS in </= 12 seconds, demonstrating improved LE strength in functional/dynamic activities.  Pt will improve MMT of bilateral hip IR/ER to >/= 4+/ 5 to improve tolerance for single leg/ lower extremity balance activities.   Pt will improve active right knee ROM = 110 to uninvolved side to improve tolerance for squatting to  objects in functional activities.   Pt goal:  decreased pain and to move more efficiently in/out of  recliner. Pt additionally wants to maintain or improve independence.   Plan     Plan of care Certification: 6/14/2023 to 08/09/2023.    Outpatient Physical Therapy 2 times weekly for 6 weeks to include the following interventions: Cervical/Lumbar Traction, Electrical Stimulation , Gait Training, Manual Therapy, Moist Heat/ Ice, Neuromuscular Re-ed, Patient Education, Self Care, Therapeutic Activities, and Therapeutic Exercise.     Christa Leyva, PT  , DPT  06/14/2023

## 2023-06-15 NOTE — PROGRESS NOTES
Please see plan of care under treatment section of chart for intial eval. Thank you for your referral.

## 2023-06-20 NOTE — PROGRESS NOTES
[facility]   Physical Therapy Treatment Note      Name: Martha Miguel  Clinic Number: [id number]    Therapy Diagnosis:   Encounter Diagnoses   Name Primary?    Acute pain of right knee Yes    Weakness of both lower extremities     Decreased range of motion (ROM) of right knee      Physician: Constantino Sargent, *    Visit Date: 6/21/2023    Physician: Constantino Sargent, *     Physician Orders: PT Eval and Treat   Medical Diagnosis from Referral: S83.241A (ICD-10-CM) - Other tear of medial meniscus of right knee as current injury, initial encounter  Evaluation Date: 6/14/2023  Authorization Period Expiration: 12/31/2023  Plan of Care Expiration: 08/09/2023  Progress Note Due: 7/14/2023  Visit # / Visits authorized: 1/ 23 + eval   FOTO: 1/1     Precautions: thoracic aortic aneurysm without rupture (9/29/2016) - avoid valsalva    PTA Visit #: 0/5     Time In: 7:02 am  Time Out: 7:55 am  Total Billable Time: 53 minutes    Subjective     Pt reports: her knee is feeling much better today. She was able to do her exercises in the pool which felt great.     She was compliant with home exercise program.  Response to previous treatment: no adverse events   Functional change: improved pain    Pain: 0/10  Location: right knee is a little sore, but not painful like it was   MEDICAID ([place])    Objective      Objective Measures updated at progress report unless specified.     Treatment     Martha received the treatments listed below:      therapeutic exercises to develop strength, endurance, ROM, flexibility, posture, and core stabilization for 10 minutes including:    X 8 minutes on SciFit at Level 2.0 for strength and endurance   3 x 12 right LE heel slides with 5 second hold at end range flexion    manual therapy techniques: Joint mobilizations, Manual traction, and Soft tissue Mobilization were applied to the: right knee for 0 minutes, including:      neuromuscular re-education activities to  improve: Balance, Coordination, Kinesthetic, Sense, Proprioception, and Posture for 25 minutes. The following activities were included:    2 x 10 R LAQ with squeeze into towel,  5 second hold  2 x 10 R quad set w towel under knee into straight leg raise  3 x 10 TKE with green theraband on R LE  3 x 10 B alternating clamshells in supine due to hip pain in side lying      therapeutic activities to improve functional performance for 18  minutes, including:    3 x 15 Mini squats with TKE, green theraband on right knee  3 x 8 glute bridging with green theraband around knees for improved bed mobility    Next visit:  Wall squats with ball behind back    gait training to improve functional mobility and safety for 0  minutes, including:    supervised modalities after being cleared for contradictions:     hot pack for 0 minutes to 0.    cold pack for 0 minutes to 0.    Patient Education and Home Exercises       Education provided:   - Pt educated regarding evaluation findings, potential plan of care and scheduling process.   - Pt educated in regard to compliance with compression sleeve for L LE lymphedema   - Pt educated on partaking in swimming again for offloading of knee jt while progressing activity level  -Pt provided with initial HEP (6/14/2023)  - Pt provided with green theraband for home exercises      Written Home Exercises Provided: yes. Exercises were reviewed and Martha was able to demonstrate them prior to the end of the session.  Martha demonstrated good  understanding of the education provided. See EMR under Patient Instructions for exercises provided during therapy sessions.    Assessment     Pt tolerated initial treatment very well without any adverse events. She had slight elevation of pain with quad activation exercises that decreased as the session progressed. Exercises modified as pt was unable to tolerate side lying secondary to hip pain. Pt will benefit from continued quad/gluteal strengthening in  upcoming visit.     Martha Is progressing well towards her goals.   Pt prognosis is Excellent.     Pt will continue to benefit from skilled outpatient physical therapy to address the deficits listed in the problem list box on initial evaluation, provide pt/family education and to maximize pt's level of independence in the home and community environment.     Pt's spiritual, cultural and educational needs considered and pt agreeable to plan of care and goals.     Anticipated barriers to physical therapy: low back pain    Goals: Goals:  Short Term Goals: 4 weeks   Pt will be IND with initial HEP to manage symptoms outside of PT.   Pt will report right knee pain </= 7/10 with sitting down on standardized chair to demonstrate improved condition.   Pt will improve at least 3 MMT of R LE by one grade to improve tolerance for weightbearing/dynamic movements.   Pt will improve passive right knee flexion ROM by >= 3 degrees to improve tolerance for sitting down onto low surfaces.      Long Term Goals: 8 weeks   Pt will improve FOTO score to </= 46% limited to demonstrate improved functional mobility.  Pt will be IND with final HEP to maintain/improve strength and mobility gained in PT.   Pt will report right knee pain </= 4/10 with squatting to demonstrate improved condition.   Pt will perform 5 x STS in </= 12 seconds, demonstrating improved LE strength in functional/dynamic activities.  Pt will improve MMT of bilateral hip IR/ER to >/= 4+/ 5 to improve tolerance for single leg/ lower extremity balance activities.   Pt will improve active right knee ROM = 110 to uninvolved side to improve tolerance for squatting to  objects in functional activities.   Pt goal:  decreased pain and to move more efficiently in/out of recliner. Pt additionally wants to maintain or improve independence.     Plan     Continue with plan of care and progress strength, balance and endurance as tolerated. Progress squat depth in upcoming  visit.    Christa Leyva, PT    06/21/2023

## 2023-06-21 ENCOUNTER — CLINICAL SUPPORT (OUTPATIENT)
Dept: REHABILITATION | Facility: HOSPITAL | Age: 63
End: 2023-06-21
Payer: MEDICARE

## 2023-06-21 DIAGNOSIS — M25.661 DECREASED RANGE OF MOTION (ROM) OF RIGHT KNEE: ICD-10-CM

## 2023-06-21 DIAGNOSIS — M25.561 ACUTE PAIN OF RIGHT KNEE: Primary | ICD-10-CM

## 2023-06-21 DIAGNOSIS — R29.898 WEAKNESS OF BOTH LOWER EXTREMITIES: ICD-10-CM

## 2023-06-21 PROCEDURE — 97110 THERAPEUTIC EXERCISES: CPT | Mod: PN

## 2023-06-23 ENCOUNTER — CLINICAL SUPPORT (OUTPATIENT)
Dept: REHABILITATION | Facility: HOSPITAL | Age: 63
End: 2023-06-23
Payer: MEDICARE

## 2023-06-23 DIAGNOSIS — M25.661 DECREASED RANGE OF MOTION (ROM) OF RIGHT KNEE: ICD-10-CM

## 2023-06-23 DIAGNOSIS — R29.898 WEAKNESS OF BOTH LOWER EXTREMITIES: ICD-10-CM

## 2023-06-23 DIAGNOSIS — M25.561 ACUTE PAIN OF RIGHT KNEE: Primary | ICD-10-CM

## 2023-06-23 PROCEDURE — 97110 THERAPEUTIC EXERCISES: CPT | Mod: PN,CQ

## 2023-06-23 NOTE — PROGRESS NOTES
"  [facility]   Physical Therapy Treatment Note      Name: Martha Miguel  Clinic Number: [id number]    Therapy Diagnosis:   Encounter Diagnoses   Name Primary?    Acute pain of right knee Yes    Weakness of both lower extremities     Decreased range of motion (ROM) of right knee      Physician: Constantino Sargent, *    Visit Date: 6/23/2023    Physician: Constantino Sargent, *     Physician Orders: PT Eval and Treat   Medical Diagnosis from Referral: S83.241A (ICD-10-CM) - Other tear of medial meniscus of right knee as current injury, initial encounter  Evaluation Date: 6/14/2023  Authorization Period Expiration: 12/31/2023  Plan of Care Expiration: 08/09/2023  Progress Note Due: 7/14/2023  Visit # / Visits authorized: 2/ 23 + eval   FOTO: 1/1     Precautions: thoracic aortic aneurysm without rupture (9/29/2016) - avoid valsalva    PTA Visit #: 0/5     Time In: 1006 am  Time Out: 1100 am  Total Billable Time: 53 minutes    Subjective     Pt reports: Arrives without complaints of pain. Stating, "My knee is doing so much better."      She was compliant with home exercise program.  Response to previous treatment: no adverse events   Functional change: improved pain    Pain: 0/10  Location: right knee is a little sore, but not painful like it was   MEDICAID (BILL TE)    Objective      Objective Measures updated at progress report unless specified.     Treatment     Physical Therapy technician assisted with treatment under direct supervision of treating therapist. Patient received 1:1 treatments for 30 minutes    Martha received the treatments listed below:      therapeutic exercises to develop strength, endurance, ROM, flexibility, posture, and core stabilization for 10 minutes including:    X 8 minutes on SciFit at Level 2.0 for strength and endurance   3 x 12 right LE heel slides with 5 second hold at end range flexion    manual therapy techniques: Joint mobilizations, Manual traction, and " Soft tissue Mobilization were applied to the: right knee for 0 minutes, including:      neuromuscular re-education activities to improve: Balance, Coordination, Kinesthetic, Sense, Proprioception, and Posture for 25 minutes. The following activities were included:    2 x 10 R LAQ with squeeze into towel,  5 second hold  2 x 10 R quad set into straight leg raise  3 x 10 TKE with green theraband on R LE  3 x 10 B alternating clamshells in supine due to hip pain in side lying with green theraband       therapeutic activities to improve functional performance for 18  minutes, including:    3 x 15 Mini squats with TKE, green theraband on right knee  3 x 8 glute bridging with green theraband around knees for improved bed mobility  Wall squats with ball behind back 3 x 10 repetitions     gait training to improve functional mobility and safety for 0  minutes, including:    supervised modalities after being cleared for contradictions:     hot pack for 0 minutes to 0.    cold pack for 0 minutes to 0.    Patient Education and Home Exercises       Education provided:   - Pt educated regarding evaluation findings, potential plan of care and scheduling process.   - Pt educated in regard to compliance with compression sleeve for L LE lymphedema   - Pt educated on partaking in swimming again for offloading of knee jt while progressing activity level  -Pt provided with initial HEP (6/14/2023)  - Pt provided with green theraband for home exercises      Written Home Exercises Provided: yes. Exercises were reviewed and Martha was able to demonstrate them prior to the end of the session.  Martha demonstrated good  understanding of the education provided. See EMR under Patient Instructions for exercises provided during therapy sessions.    Assessment     Martha required cueing to improve quad activation with straight leg raises. Responded well with improvement noted. She was able to progress hook lying clams shells with green theraband and  wall squats with ball well without issues. She is making great progress towards her goals evident by subjective reporting. Will continue to benefit from skilled Physical Therapy to maximize strength gain to allow her to return to prior level of function without pain or limitations.      Martha Is progressing well towards her goals.   Pt prognosis is Excellent.     Pt will continue to benefit from skilled outpatient physical therapy to address the deficits listed in the problem list box on initial evaluation, provide pt/family education and to maximize pt's level of independence in the home and community environment.     Pt's spiritual, cultural and educational needs considered and pt agreeable to plan of care and goals.     Anticipated barriers to physical therapy: low back pain    Goals: Goals:  Short Term Goals: 4 weeks In progress 6/23/2023  Pt will be IND with initial HEP to manage symptoms outside of PT.   Pt will report right knee pain </= 7/10 with sitting down on standardized chair to demonstrate improved condition.   Pt will improve at least 3 MMT of R LE by one grade to improve tolerance for weightbearing/dynamic movements.   Pt will improve passive right knee flexion ROM by >= 3 degrees to improve tolerance for sitting down onto low surfaces.      Long Term Goals: 8 weeks   Pt will improve FOTO score to </= 46% limited to demonstrate improved functional mobility.  Pt will be IND with final HEP to maintain/improve strength and mobility gained in PT.   Pt will report right knee pain </= 4/10 with squatting to demonstrate improved condition.   Pt will perform 5 x STS in </= 12 seconds, demonstrating improved LE strength in functional/dynamic activities.  Pt will improve MMT of bilateral hip IR/ER to >/= 4+/ 5 to improve tolerance for single leg/ lower extremity balance activities.   Pt will improve active right knee ROM = 110 to uninvolved side to improve tolerance for squatting to  objects in  functional activities.   Pt goal:  decreased pain and to move more efficiently in/out of recliner. Pt additionally wants to maintain or improve independence.     Plan     Continue with plan of care and progress strength, balance and endurance as tolerated. Progress squat depth in upcoming visit.    Milly Lan, PTA    06/21/2023

## 2023-06-26 ENCOUNTER — CLINICAL SUPPORT (OUTPATIENT)
Dept: REHABILITATION | Facility: HOSPITAL | Age: 63
End: 2023-06-26
Payer: MEDICARE

## 2023-06-26 DIAGNOSIS — M25.661 DECREASED RANGE OF MOTION (ROM) OF RIGHT KNEE: ICD-10-CM

## 2023-06-26 DIAGNOSIS — M25.561 ACUTE PAIN OF RIGHT KNEE: Primary | ICD-10-CM

## 2023-06-26 DIAGNOSIS — R29.898 WEAKNESS OF BOTH LOWER EXTREMITIES: ICD-10-CM

## 2023-06-26 PROCEDURE — 97112 NEUROMUSCULAR REEDUCATION: CPT | Mod: PN

## 2023-06-26 PROCEDURE — 97110 THERAPEUTIC EXERCISES: CPT | Mod: PN

## 2023-06-26 PROCEDURE — 97530 THERAPEUTIC ACTIVITIES: CPT | Mod: PN

## 2023-06-26 NOTE — PROGRESS NOTES
[facility]   Physical Therapy Treatment Note      Name: Martha Miguel  Clinic Number: [id number]    Therapy Diagnosis:   Encounter Diagnoses   Name Primary?    Acute pain of right knee Yes    Weakness of both lower extremities     Decreased range of motion (ROM) of right knee        Physician: Constantino Sargent, *    Visit Date: 6/26/2023     Physician Orders: PT Eval and Treat   Medical Diagnosis from Referral: S83.241A (ICD-10-CM) - Other tear of medial meniscus of right knee as current injury, initial encounter  Evaluation Date: 6/14/2023  Authorization Period Expiration: 12/31/2023  Plan of Care Expiration: 08/09/2023  Progress Note Due: 7/14/2023  Visit # / Visits authorized: 3/ 23 + eval   FOTO: 2/2     Precautions: thoracic aortic aneurysm without rupture (9/29/2016) - avoid valsalva    PTA Visit #: 0/5     Time In: 12:58 pm  Time Out: 1:53 pm  Total Billable Time: 55 minutes    Subjective     Pt reports: She was sore after last Friday, but her knee has still been feeling much better. She has a CT scan of her aneurysm tomorrow.     She was partially compliant with home exercise program.  Response to previous treatment: no adverse events   Functional change: improved pain    Pain: 0/10  Location: right knee is a little sore, but not painful like it was   MEDICAID (BILL TE)    Objective      Objective Measures updated at progress report unless specified.     Treatment       Martha received the treatments listed below:      therapeutic exercises to develop strength, endurance, ROM, flexibility, posture, and core stabilization for 10 minutes including:    X 8 minutes on SciFit at Level 3.0 for strength and endurance   3 x 12 right LE heel slides with 5 second hold at end range flexion (discharge this exercise next visit)    manual therapy techniques: Joint mobilizations, Manual traction, and Soft tissue Mobilization were applied to the: right knee for 0 minutes,  "including:      neuromuscular re-education activities to improve: Balance, Coordination, Kinesthetic, Sense, Proprioception, and Posture for 25 minutes. The following activities were included:    -2 x 10 R LAQ with #5,  3 second hold  -2 x 10 R quad set into straight leg raise   -3 x 10 B alternating clamshells in supine due to hip pain in side lying with green theraband , 3 second hold   -Shuttle single leg press 2 x 10 with 50#  for increased sense and proprioception on right LE    therapeutic activities to improve functional performance for 18 minutes, including:    -3 x 10 glute bridging with green theraband around knees for improved bed mobility  -Sit to stands from 18" step with airex 3 x 12 , no hands   -Shuttle leg press 3 x 10 with 87.5# to improve full sit to stand from lower surfaces        gait training to improve functional mobility and safety for 0  minutes, including:    supervised modalities after being cleared for contradictions:     hot pack for 0 minutes to 0.    cold pack for 0 minutes to 0.    Patient Education and Home Exercises       Education provided:   - Pt educated regarding evaluation findings, potential plan of care and scheduling process.   - Pt educated in regard to compliance with compression sleeve for L LE lymphedema   - Pt educated on partaking in swimming again for offloading of knee jt while progressing activity level  -Pt provided with initial HEP (6/14/2023)  - Pt provided with green theraband for home exercises      Written Home Exercises Provided: yes. Exercises were reviewed and Martha was able to demonstrate them prior to the end of the session.  Martha demonstrated good  understanding of the education provided. See EMR under Patient Instructions for exercises provided during therapy sessions.    Assessment     Martha responded well to therapy, reporting improvement in symptoms prior to conclusion of therapeutic session. Strength progressions made with focus on increasing " squat depth with great response and no reports of knee pain/discomfort.  Overall, Martha will continue to benefit from skilled Physical Therapy to maximize strength gain to allow her to return to prior level of function without pain or limitations.      Martha Is progressing well towards her goals.   Pt prognosis is Excellent.     Pt will continue to benefit from skilled outpatient physical therapy to address the deficits listed in the problem list box on initial evaluation, provide pt/family education and to maximize pt's level of independence in the home and community environment.     Pt's spiritual, cultural and educational needs considered and pt agreeable to plan of care and goals.     Anticipated barriers to physical therapy: low back pain    Goals: Goals:  Short Term Goals: 4 weeks In progress 6/26/2023  Pt will be IND with initial HEP to manage symptoms outside of PT.   Pt will report right knee pain </= 7/10 with sitting down on standardized chair to demonstrate improved condition.   Pt will improve at least 3 MMT of R LE by one grade to improve tolerance for weightbearing/dynamic movements.   Pt will improve passive right knee flexion ROM by >= 3 degrees to improve tolerance for sitting down onto low surfaces.      Long Term Goals: 8 weeks   Pt will improve FOTO score to </= 46% limited to demonstrate improved functional mobility.  Pt will be IND with final HEP to maintain/improve strength and mobility gained in PT.   Pt will report right knee pain </= 4/10 with squatting to demonstrate improved condition.   Pt will perform 5 x STS in </= 12 seconds, demonstrating improved LE strength in functional/dynamic activities.  Pt will improve MMT of bilateral hip IR/ER to >/= 4+/ 5 to improve tolerance for single leg/ lower extremity balance activities.   Pt will improve active right knee ROM = 110 to uninvolved side to improve tolerance for squatting to  objects in functional activities.   Pt goal:   decreased pain and to move more efficiently in/out of recliner. Pt additionally wants to maintain or improve independence.     Plan     Continue with plan of care and progress strength, balance and endurance as tolerated. Follow up about CT scan.    Christa Leyva, PT    06/26/2023

## 2023-06-27 NOTE — PROGRESS NOTES
[facility]   Physical Therapy Treatment Note      Name: Martha Miguel  Clinic Number: [id number]    Therapy Diagnosis:   Encounter Diagnoses   Name Primary?    Acute pain of right knee Yes    Weakness of both lower extremities     Decreased range of motion (ROM) of right knee          Physician: Constantino Sargent, *    Visit Date: 6/28/2023     Physician Orders: PT Eval and Treat   Medical Diagnosis from Referral: S83.241A (ICD-10-CM) - Other tear of medial meniscus of right knee as current injury, initial encounter  Evaluation Date: 6/14/2023  Authorization Period Expiration: 12/31/2023  Plan of Care Expiration: 08/09/2023  Progress Note Due: 7/14/2023  Visit # / Visits authorized: 4/ 23 + eval   FOTO: 2/3     Precautions: thoracic aortic aneurysm without rupture (9/29/2016) - avoid valsalva    PTA Visit #: 0/5     Time In: 1:06 pm  Time Out: 2:00 pm  Total Billable Time: 54 minutes    Subjective     Pt reports: She follows up on the 13th regarding recent CT scan of aneurysm. She was walking all day yesterday, and has some swelling in left lower extremity per usual. She is no longer sore like she was in previous visit.     She was partially compliant with home exercise program.  Response to previous treatment: no adverse events   Functional change: improved pain    Pain: 0/10  Location: no pain today  MEDICAID (BILL TE)    Objective      Objective Measures updated at progress report unless specified.     Treatment       Martha received the treatments listed below:      therapeutic exercises to develop strength, endurance, ROM, flexibility, posture, and core stabilization for 0 minutes including:    Not today:  X 8 minutes on SciFit at Level 3.0 for strength and endurance   3 x 12 right LE heel slides with 5 second hold at end range flexion (discharge this exercise next visit)    manual therapy techniques: Joint mobilizations, Manual traction, and Soft tissue Mobilization were applied  "to the: right knee for 0 minutes, including:      neuromuscular re-education activities to improve: Balance, Coordination, Kinesthetic, Sense, Proprioception, and Posture for 30 minutes. The following activities were included:    -2 x 15  R LAQ with #5,  3 second hold  - Shuttle single leg press 2 x 15 with 75#  for increased sense and proprioception on right LE  - Wall squatting 4 x 5  - Pallof press with bend in knees, 3 steps then punch out, red theraband 4 x 5    Not today:  -2 x 10 R quad set into straight leg raise   -3 x 10 B alternating clamshells in supine due to hip pain in side lying with green theraband , 3 second hold     therapeutic activities to improve functional performance for 24 minutes, including:      -Shuttle leg press 2 x 15 with 100# to improve full sit to stand from lower surfaces  - Picking up 8 lb ball (ball elevated on two inch purple stack) ~verbal cues for decreased trunk flexion  - Sled pushes un-weighted 5 x 7 yards (pushing and pulling)    Not today:  -Sit to stands from 18" step with airex 3 x 12 , no hands     gait training to improve functional mobility and safety for 0  minutes, including:    supervised modalities after being cleared for contradictions:     hot pack for 0 minutes to 0.    cold pack for 0 minutes to 0.    Patient Education and Home Exercises       Education provided:   - Pt educated regarding evaluation findings, potential plan of care and scheduling process.   - Pt educated in regard to compliance with compression sleeve for L LE lymphedema   - Pt educated on partaking in swimming again for offloading of knee jt while progressing activity level  -Pt provided with initial HEP (6/14/2023)  - Pt provided with green theraband for home exercises      Written Home Exercises Provided: yes. Exercises were reviewed and Martha was able to demonstrate them prior to the end of the session.  Martha demonstrated good  understanding of the education provided. See EMR under " Patient Instructions for exercises provided during therapy sessions.    Assessment     Martha responded well to all progressions made today focusing on lower extremity strength and biomechanics. Pt has difficulty maintaining appropriate hip hinge during functional activities with slight improvement after being provided with verbal cues. Overall, Martha will continue to benefit from skilled Physical Therapy to maximize strength gain to allow her to return to prior level of function without pain or limitations with the goal of discharging in two weeks.    Martha Is progressing well towards her goals.   Pt prognosis is Excellent.     Pt will continue to benefit from skilled outpatient physical therapy to address the deficits listed in the problem list box on initial evaluation, provide pt/family education and to maximize pt's level of independence in the home and community environment.     Pt's spiritual, cultural and educational needs considered and pt agreeable to plan of care and goals.     Anticipated barriers to physical therapy: low back pain    Goals: Goals:  Short Term Goals: 4 weeks In progress 6/28/2023  Pt will be IND with initial HEP to manage symptoms outside of PT.   Pt will report right knee pain </= 7/10 with sitting down on standardized chair to demonstrate improved condition.   Pt will improve at least 3 MMT of R LE by one grade to improve tolerance for weightbearing/dynamic movements.   Pt will improve passive right knee flexion ROM by >= 3 degrees to improve tolerance for sitting down onto low surfaces.      Long Term Goals: 8 weeks   Pt will improve FOTO score to </= 46% limited to demonstrate improved functional mobility.  Pt will be IND with final HEP to maintain/improve strength and mobility gained in PT.   Pt will report right knee pain </= 4/10 with squatting to demonstrate improved condition.   Pt will perform 5 x STS in </= 12 seconds, demonstrating improved LE strength in functional/dynamic  activities.  Pt will improve MMT of bilateral hip IR/ER to >/= 4+/ 5 to improve tolerance for single leg/ lower extremity balance activities.   Pt will improve active right knee ROM = 110 to uninvolved side to improve tolerance for squatting to  objects in functional activities.   Pt goal:  decreased pain and to move more efficiently in/out of recliner. Pt additionally wants to maintain or improve independence.     Plan     Continue with plan of care and progress strength, balance and endurance as tolerated. Follow up about CT scan.    Christa Leyva, PT    06/28/2023

## 2023-06-28 ENCOUNTER — CLINICAL SUPPORT (OUTPATIENT)
Dept: REHABILITATION | Facility: HOSPITAL | Age: 63
End: 2023-06-28
Payer: MEDICARE

## 2023-06-28 DIAGNOSIS — M25.661 DECREASED RANGE OF MOTION (ROM) OF RIGHT KNEE: ICD-10-CM

## 2023-06-28 DIAGNOSIS — R29.898 WEAKNESS OF BOTH LOWER EXTREMITIES: ICD-10-CM

## 2023-06-28 DIAGNOSIS — M25.561 ACUTE PAIN OF RIGHT KNEE: Primary | ICD-10-CM

## 2023-06-28 PROCEDURE — 97110 THERAPEUTIC EXERCISES: CPT | Mod: PN

## 2023-07-03 ENCOUNTER — CLINICAL SUPPORT (OUTPATIENT)
Dept: REHABILITATION | Facility: HOSPITAL | Age: 63
End: 2023-07-03
Payer: MEDICARE

## 2023-07-03 DIAGNOSIS — M25.661 DECREASED RANGE OF MOTION (ROM) OF RIGHT KNEE: ICD-10-CM

## 2023-07-03 DIAGNOSIS — M25.561 ACUTE PAIN OF RIGHT KNEE: Primary | ICD-10-CM

## 2023-07-03 DIAGNOSIS — R29.898 WEAKNESS OF BOTH LOWER EXTREMITIES: ICD-10-CM

## 2023-07-03 PROCEDURE — 97112 NEUROMUSCULAR REEDUCATION: CPT | Mod: PN

## 2023-07-03 PROCEDURE — 97110 THERAPEUTIC EXERCISES: CPT | Mod: PN

## 2023-07-03 NOTE — PROGRESS NOTES
[facility]   Physical Therapy Treatment Note      Name: Martha Miguel  Clinic Number: [id number]    Therapy Diagnosis:   Encounter Diagnoses   Name Primary?    Acute pain of right knee Yes    Weakness of both lower extremities     Decreased range of motion (ROM) of right knee        Physician: Constantino Sargent, *    Visit Date: 7/3/2023     Physician Orders: PT Eval and Treat   Medical Diagnosis from Referral: S83.241A (ICD-10-CM) - Other tear of medial meniscus of right knee as current injury, initial encounter  Evaluation Date: 6/14/2023  Authorization Period Expiration: 12/31/2023  Plan of Care Expiration: 08/09/2023  Progress Note Due: 7/14/2023  Visit # / Visits authorized: 5/ 23 + eval   FOTO: 2/3 (provide FOTO next visit for discharge)     Precautions: thoracic aortic aneurysm without rupture (9/29/2016) - avoid valsalva    PTA Visit #: 0/5     Time In: 11:02 am  Time Out: 12:00 pm  Total Billable Time: 58 minutes    Subjective     Pt reports: she is feeling really good. Last visit she knows she did good pushing herself, but did not have much soreness. She did not take her medication this morning, but will take it as soon as she goes home. She is feeling good.     She was partially compliant with home exercise program.  Response to previous treatment: no adverse events   Functional change: improved pain    Pain: 0/10   Location: no pain today  MEDICAID ([place])    Objective      Objective Measures updated at progress report unless specified.     Blood pressure:  143/85  at 11:15 am   138/93 at 11:21 am  144/100 at 11:57 am      Active Range of Motion:  Knee extension: 0  Knee flexion: 130 degrees    Treatment       Martha received the treatments listed below:      therapeutic exercises to develop strength, endurance, ROM, flexibility, posture, and core stabilization for 12 minutes including:    Not today:  X 8 minutes on SciFit at Level 4.0 for strength and endurance  "  (Includes time to record blood pressure following activity)    manual therapy techniques: Joint mobilizations, Manual traction, and Soft tissue Mobilization were applied to the: right knee for 0 minutes, including:      neuromuscular re-education activities to improve: Balance, Coordination, Kinesthetic, Sense, Proprioception, and Posture for 46 minutes. The following activities were included:    - Shuttle single leg press 3 x 10  with 75#  for increased sense and proprioception on right LE  -Shuttle leg press 3 x 12 with 112# for quad activation progression  - 3 x 10 R quad set into straight leg raise, trunk elevated on mat  - 3 x 10 B alternating clamshells in supine due to hip pain in side lying with blue theraband , 3 second hold   - 3 x 10 total knee extension on Precor #40    (Includes time to record blood pressure following activity)    Deferred secondary to elevated blood pressure:  - Wall squatting 4 x 5  - Pallof press with bend in knees, 3 steps then punch out, red theraband 4 x 5    therapeutic activities to improve functional performance for 0 minutes, including:      Deferred secondary to elevated blood pressure:  - Picking up 8 lb ball (ball elevated on two inch purple stack) ~verbal cues for decreased trunk flexion  - Sled pushes un-weighted 5 x 7 yards (pushing and pulling)  -Sit to stands from 18" step with airex 3 x 12 , no hands     gait training to improve functional mobility and safety for 0  minutes, including:    supervised modalities after being cleared for contradictions:     hot pack for 0 minutes to 0.    cold pack for 0 minutes to 0.    Patient Education and Home Exercises       Education provided:   - Pt educated regarding evaluation findings, potential plan of care and scheduling process.   - Pt educated in regard to compliance with compression sleeve for L LE lymphedema   - Pt educated on partaking in swimming again for offloading of knee jt while progressing activity level  -Pt " provided with initial HEP (6/14/2023)  - Pt provided with green theraband for home exercises      Written Home Exercises Provided: yes. Exercises were reviewed and Martha was able to demonstrate them prior to the end of the session.  Martha demonstrated good  understanding of the education provided. See EMR under Patient Instructions for exercises provided during therapy sessions.    Assessment     Martha responded well to all exercises today without any adverse symptoms despite forgetting to take her medication this morning. Exercises modified today to avoid repeated quick transitions that may alter blood pressure. Blood pressure monitored throughout therapy and prior to patient departure. Pt educated on taking medication following her visit today and to monitor for any elevation of symptoms. She tolerated strength progressions very well without any knee pain. Active knee range of motion has improved greatly to full, pain free range of motion. Overall, patient is doing very well and is expected to discharge in upcoming visit.     Martha Is progressing well towards her goals.   Pt prognosis is Excellent.     Pt will continue to benefit from skilled outpatient physical therapy to address the deficits listed in the problem list box on initial evaluation, provide pt/family education and to maximize pt's level of independence in the home and community environment.     Pt's spiritual, cultural and educational needs considered and pt agreeable to plan of care and goals.     Anticipated barriers to physical therapy: low back pain    Goals: Goals:  Short Term Goals: 4 weeks In progress 7/3/2023  Pt will be IND with initial HEP to manage symptoms outside of PT.   Pt will report right knee pain </= 7/10 with sitting down on standardized chair to demonstrate improved condition.   Pt will improve at least 3 MMT of R LE by one grade to improve tolerance for weightbearing/dynamic movements.   Pt will improve passive right knee  flexion ROM by >= 3 degrees to improve tolerance for sitting down onto low surfaces.      Long Term Goals: 8 weeks   Pt will improve FOTO score to </= 46% limited to demonstrate improved functional mobility.  Pt will be IND with final HEP to maintain/improve strength and mobility gained in PT.   Pt will report right knee pain </= 4/10 with squatting to demonstrate improved condition.   Pt will perform 5 x STS in </= 12 seconds, demonstrating improved LE strength in functional/dynamic activities.  Pt will improve MMT of bilateral hip IR/ER to >/= 4+/ 5 to improve tolerance for single leg/ lower extremity balance activities.   Pt will improve active right knee ROM = 110 to uninvolved side to improve tolerance for squatting to  objects in functional activities.   Pt goal:  decreased pain and to move more efficiently in/out of recliner. Pt additionally wants to maintain or improve independence.     Plan     Continue with plan of care and progress strength, balance and endurance as tolerated.     Christa Leyva, PT    07/03/2023

## 2023-07-05 ENCOUNTER — CLINICAL SUPPORT (OUTPATIENT)
Dept: REHABILITATION | Facility: HOSPITAL | Age: 63
End: 2023-07-05
Payer: MEDICARE

## 2023-07-05 DIAGNOSIS — R29.898 WEAKNESS OF BOTH LOWER EXTREMITIES: ICD-10-CM

## 2023-07-05 DIAGNOSIS — M25.561 ACUTE PAIN OF RIGHT KNEE: Primary | ICD-10-CM

## 2023-07-05 DIAGNOSIS — M25.661 DECREASED RANGE OF MOTION (ROM) OF RIGHT KNEE: ICD-10-CM

## 2023-07-05 PROCEDURE — 97110 THERAPEUTIC EXERCISES: CPT | Mod: PN

## 2023-07-05 NOTE — PROGRESS NOTES
[facility]  Outpatient Discharge Note    Name: Martha Miguel  Clinic Number: [id number]    Therapy Diagnosis:   Encounter Diagnoses   Name Primary?    Acute pain of right knee Yes    Weakness of both lower extremities     Decreased range of motion (ROM) of right knee      Physician: Constantino Sargent, *    Physician Orders: PT Eval and Treat   Medical Diagnosis from Referral: S83.241A (ICD-10-CM) - Other tear of medial meniscus of right knee as current injury, initial encounter  Evaluation Date: 6/14/2023  Authorization Period Expiration: 12/31/2023  Plan of Care Expiration: 08/09/2023  Progress Note Due: 7/14/2023  Visit # / Visits authorized: 7/ 23 + eval   FOTO: 3/3      Precautions: thoracic aortic aneurysm without rupture (9/29/2016) - avoid valsalva    Date of Last visit: 07/05/2023  Total Visits Received: 6  Time in: 11:01 am  Time out:11:59 am  Total time: 58 minutes    ASSESSMENT      Functional assessment/activities:  3 x 10 sit to stands with good trunk positioning  X 8 ascending/descending stairs  X 6 sled pushes x 15 yards  X 10 repetitions on several machines (leg extension, leg press, leg curls, triceps extensions) and reviewed hip abduction/adduction machine for increased comfort working out at the gym **patient educated on appropriate breathing during strength training to avoid valsalva/increased intraabdominal pressure**  - pt educated on pool activity in the shallow end of the pool to continue building strength and endurance.       5 x STS: (06/14/2023) 16 seconds without hands,   (07/05/2023)12 seconds without hands     Range of Motion (Passive):   Knee Flexion Comments   Right  (06/14/23) 105 active, 115 passive Pain at end range   Right (07/05/23) 125 active, 130 passive Pain free      Range of Motion (Active):   Knee Extension Comments   Right  WNL  WNL      Lower Extremity Strength  Right LE  (6/14/2023) (07/05/2023) Left LE (6/14/2023) (07/05/2023)    Quadriceps: 5/5 5/5 Quadriceps: 5/5 5/5   Hamstrings: 4+/5 5/5 Hamstrings: 5/5 5/5   Hip flexion (supine): 4-/5 5/5 Hip flexion (supine): 4+/5 4+/5   Hip extension:  4-/5 5/5 Hip extension: 4+/5 5/5   Hip ER:  4/5 5/5 Hip ER:  4/5 5/5   Hip IR: 4/5 5/5 Hip IR: 4/5 5/5      Special Tests:    Right Right Left   Valgus Stress Test negative negative NT   Varus Stress test negative negative NT   Lachman's test negative negative NT   Posterior Lachman negative negative NT   Preston's Test negative negative NT   Thessaly's Test positive negative NT   Apley's compression positive negative NT        Discharge reason: Patient is now asymptomatic and Patient has met all of his/her goals      Discharge FOTO Score: improved score from 39 to 99 (1%limited)    Goals: Pt met 4/4 short term goals and 7/7 long term goals at this time and is ready for discharge.    Goals:   Short Term Goals: 4 weeks In progress 7/3/2023  Pt will be IND with initial HEP to manage symptoms outside of PT. (MET 07/05/2023)  Pt will report right knee pain </= 7/10 with sitting down on standardized chair to demonstrate improved condition. (MET 07/05/2023)  Pt will improve at least 3 MMT of R LE by one grade to improve tolerance for weightbearing/dynamic movements. (MET 07/05/2023)  Pt will improve passive right knee flexion ROM by >= 3 degrees to improve tolerance for sitting down onto low surfaces. (MET 07/05/2023)     Long Term Goals: 8 weeks   Pt will improve FOTO score to </= 46% limited to demonstrate improved functional mobility. (MET 07/05/2023)  Pt will be IND with final HEP to maintain/improve strength and mobility gained in PT. (MET 07/05/2023)  Pt will report right knee pain </= 4/10 with squatting to demonstrate improved condition. (MET 07/05/2023)  Pt will perform 5 x STS in </= 12 seconds, demonstrating improved LE strength in functional/dynamic activities. (MET 07/05/2023)  Pt will improve MMT of bilateral hip IR/ER to >/= 4+/ 5 to improve  tolerance for single leg/ lower extremity balance activities.  (MET 07/05/2023)  Pt will improve active right knee ROM = 110 to uninvolved side to improve tolerance for squatting to  objects in functional activities.  (MET 07/05/2023)  Pt goal:  decreased pain and to move more efficiently in/out of recliner. Pt additionally wants to maintain or improve independence.  (MET 07/05/2023)    PLAN   This patient is discharged from Physical Therapy with education on the importance of adherence to home exercise program to maintain strength and endurance gains made.     Christa Leyva, PT , DPT  07/05/2023

## 2023-07-05 NOTE — PATIENT INSTRUCTIONS
(using blue theraband)    In pool (try to go to the pool 3x a week!):  Begin and end by walking laps(forwards, backwards, and side to side) for at least 10 minutes         -You can also practice mini squats in the pool at the shallow end, sitting hips BACK for 3 x 15

## 2023-07-07 ENCOUNTER — TELEPHONE (OUTPATIENT)
Dept: PODIATRY | Facility: CLINIC | Age: 63
End: 2023-07-07
Payer: MEDICARE

## 2023-07-24 ENCOUNTER — OFFICE VISIT (OUTPATIENT)
Dept: ORTHOPEDICS | Facility: CLINIC | Age: 63
End: 2023-07-24
Payer: MEDICARE

## 2023-07-24 VITALS — BODY MASS INDEX: 44.41 KG/M2 | WEIGHT: 293 LBS | HEIGHT: 68 IN

## 2023-07-24 DIAGNOSIS — S83.241A OTHER TEAR OF MEDIAL MENISCUS OF RIGHT KNEE AS CURRENT INJURY, INITIAL ENCOUNTER: Primary | ICD-10-CM

## 2023-07-24 PROCEDURE — 3044F HG A1C LEVEL LT 7.0%: CPT | Mod: CPTII,S$GLB,, | Performed by: PHYSICIAN ASSISTANT

## 2023-07-24 PROCEDURE — 3044F PR MOST RECENT HEMOGLOBIN A1C LEVEL <7.0%: ICD-10-PCS | Mod: CPTII,S$GLB,, | Performed by: PHYSICIAN ASSISTANT

## 2023-07-24 PROCEDURE — 4010F ACE/ARB THERAPY RXD/TAKEN: CPT | Mod: CPTII,S$GLB,, | Performed by: PHYSICIAN ASSISTANT

## 2023-07-24 PROCEDURE — 3061F NEG MICROALBUMINURIA REV: CPT | Mod: CPTII,S$GLB,, | Performed by: PHYSICIAN ASSISTANT

## 2023-07-24 PROCEDURE — 3066F PR DOCUMENTATION OF TREATMENT FOR NEPHROPATHY: ICD-10-PCS | Mod: CPTII,S$GLB,, | Performed by: PHYSICIAN ASSISTANT

## 2023-07-24 PROCEDURE — 3061F PR NEG MICROALBUMINURIA RESULT DOCUMENTED/REVIEW: ICD-10-PCS | Mod: CPTII,S$GLB,, | Performed by: PHYSICIAN ASSISTANT

## 2023-07-24 PROCEDURE — 1160F PR REVIEW ALL MEDS BY PRESCRIBER/CLIN PHARMACIST DOCUMENTED: ICD-10-PCS | Mod: CPTII,S$GLB,, | Performed by: PHYSICIAN ASSISTANT

## 2023-07-24 PROCEDURE — 3008F PR BODY MASS INDEX (BMI) DOCUMENTED: ICD-10-PCS | Mod: CPTII,S$GLB,, | Performed by: PHYSICIAN ASSISTANT

## 2023-07-24 PROCEDURE — 1160F RVW MEDS BY RX/DR IN RCRD: CPT | Mod: CPTII,S$GLB,, | Performed by: PHYSICIAN ASSISTANT

## 2023-07-24 PROCEDURE — 4010F PR ACE/ARB THEARPY RXD/TAKEN: ICD-10-PCS | Mod: CPTII,S$GLB,, | Performed by: PHYSICIAN ASSISTANT

## 2023-07-24 PROCEDURE — 99213 PR OFFICE/OUTPT VISIT, EST, LEVL III, 20-29 MIN: ICD-10-PCS | Mod: S$GLB,,, | Performed by: PHYSICIAN ASSISTANT

## 2023-07-24 PROCEDURE — 1159F PR MEDICATION LIST DOCUMENTED IN MEDICAL RECORD: ICD-10-PCS | Mod: CPTII,S$GLB,, | Performed by: PHYSICIAN ASSISTANT

## 2023-07-24 PROCEDURE — 3066F NEPHROPATHY DOC TX: CPT | Mod: CPTII,S$GLB,, | Performed by: PHYSICIAN ASSISTANT

## 2023-07-24 PROCEDURE — 1159F MED LIST DOCD IN RCRD: CPT | Mod: CPTII,S$GLB,, | Performed by: PHYSICIAN ASSISTANT

## 2023-07-24 PROCEDURE — 3008F BODY MASS INDEX DOCD: CPT | Mod: CPTII,S$GLB,, | Performed by: PHYSICIAN ASSISTANT

## 2023-07-24 PROCEDURE — 99213 OFFICE O/P EST LOW 20 MIN: CPT | Mod: S$GLB,,, | Performed by: PHYSICIAN ASSISTANT

## 2023-07-24 NOTE — PROGRESS NOTES
[facility]  [address]  [place] [address]  Phone: [phone]   [phone]    Patient's PCP: Katya Hummel MD  Referring Provider: No ref. provider found    Subjective:      Chief Complaint:   Chief Complaint   Patient presents with    Right Knee - Pain     Right knee pain follow up. Received inj 06/12/23 which offered great relief. Has been attending PT with great relief.       Past Medical History:   Diagnosis Date    Abnormal heart rhythm     Allergy     Aneurysm of abdominal vessel 2020    Arthritis     COPD (chronic obstructive pulmonary disease)     Coronary artery disease     Depression     Diabetes mellitus, type 2     Essential hypertension 08/16/2016    Right sided sciatica 04/23/2018       Past Surgical History:   Procedure Laterality Date    CHOLECYSTECTOMY      FRACTURE SURGERY      HYSTERECTOMY      INJECTION OF ANESTHETIC AGENT AROUND MEDIAL BRANCH NERVES INNERVATING LUMBAR FACET JOINT Bilateral 7/5/2018    Procedure: Block-nerve-medial branch-lumbar;  Surgeon: Arthur Orellana MD;  Location: [facility] OR;  Service: Pain Management;  Laterality: Bilateral;  L2, 3, 4, 5    TONSILLECTOMY         Current Outpatient Medications   Medication Sig    aspirin (ECOTRIN) 81 MG EC tablet Take 81 mg by mouth once daily.    atorvastatin (LIPITOR) 10 MG tablet Take 1 tablet (10 mg total) by mouth once daily.    blood sugar diagnostic Strp Test glucose twice daily    blood-glucose meter kit Use as instructed    carvediloL (COREG) 3.125 MG tablet Take 1 tablet (3.125 mg total) by mouth 2 (two) times daily with meals.    L. acidophilus/L. rhamnosus (DIGESTIVE HEALTH PROBIOTIC ORAL) Take 1 tablet by mouth once daily.    lancets 32 gauge Misc Test glucose twice daily    linaCLOtide (LINZESS) 72 mcg Cap capsule Take 1 capsule (72 mcg total) by mouth before breakfast. For constipation    lisinopriL (PRINIVIL,ZESTRIL) 2.5 MG tablet Take 1 tablet (2.5 mg total) by mouth once daily.     meloxicam (MOBIC) 15 MG tablet Take 1 tablet (15 mg total) by mouth once daily.    semaglutide (OZEMPIC) 0.25 mg or 0.5 mg (2 mg/3 mL) pen injector Inject 0.5 mg into the skin every 7 days.    solifenacin (VESICARE) 10 MG tablet Take 1 tablet (10 mg total) by mouth once daily. For bladder     No current facility-administered medications for this visit.       Review of patient's allergies indicates:   Allergen Reactions    Phenytoin Hives    Dilantin [phenytoin sodium extended] Rash    Lovenox [enoxaparin] Rash and Hives       Family History   Family history unknown: Yes       Social History     Socioeconomic History    Marital status:    Tobacco Use    Smoking status: Never    Smokeless tobacco: Never   Substance and Sexual Activity    Alcohol use: No    Drug use: No    Sexual activity: Never       History of present illness: Ms. Thomas comes in today for follow-up for her right knee.  She was last here about 6 weeks ago given an intra-articular injection into the right knee and started in formal physical therapy.  She reports she is had great relief in her symptoms and continues to do well.  She comes in today for routine follow-up.    Review of Systems:    Constitutional: Negative for chills, fever and weight loss.   HENT: Negative for congestion.    Eyes: Negative for discharge and redness.   Respiratory: Negative for cough and shortness of breath.    Cardiovascular: Negative for chest pain.   Gastrointestinal: Negative for nausea and vomiting.   Musculoskeletal: See HPI.   Skin: Negative for rash.   Neurological: Negative for headaches.   Endo/Heme/Allergies: Does not bruise/bleed easily.   Psychiatric/Behavioral: The patient is not nervous/anxious.    All other systems reviewed and are negative.       Objective:      Physical Examination:    Vital Signs:  There were no vitals filed for this visit.    Body mass index is 50.75 kg/m².    This a well-developed, well nourished patient in no acute distress.   They are alert and oriented and cooperative to examination.     Right knee exam:  Her right knee exam is similar to where she was on her last visit with us. Skin to right knee is clean dry and intact.  There is no erythema or ecchymosis.  There are no signs or symptoms of infection.  Patient is neurovascularly intact throughout the right lower extremity.  Right calf is soft and nontender.  Right knee active range of motion is improved to 0-120 degrees from 0-100 degrees.  It is stable to varus and valgus stresses.  She has no real marked medial or lateral joint line tenderness.  There is no effusion.  She can weightbear as tolerated on her right lower extremity.  She has a negative straight leg raise on the right.    Pertinent New Results:        XRAY Report / Interpretation:   No new radiographs were taken on today's clinic visit.      Assessment:       1. Other tear of medial meniscus of right knee as current injury, initial encounter      Plan:     Other tear of medial meniscus of right knee as current injury, initial encounter        Follow up if symptoms worsen or fail to improve.    Patient continues to do quite well from her most recent corticosteroid injection and round of physical therapy.  She has completed PT. She continues to work on her own with a home exercise program.  I encouraged her to continue to do this.  She will follow up with us on an as-needed basis for any recurrence of symptoms in the future.        Constantino Sargent, BETHANIES, PA-C    This note was created using [employer] voice recognition software that occasionally misinterprets words or phrases.

## 2023-08-11 DIAGNOSIS — E11.9 TYPE 2 DIABETES MELLITUS WITHOUT COMPLICATION, WITHOUT LONG-TERM CURRENT USE OF INSULIN: ICD-10-CM

## 2023-08-11 RX ORDER — SEMAGLUTIDE 0.68 MG/ML
0.5 INJECTION, SOLUTION SUBCUTANEOUS
Qty: 3 ML | Refills: 1 | Status: SHIPPED | OUTPATIENT
Start: 2023-08-11 | End: 2023-08-29

## 2023-08-11 NOTE — TELEPHONE ENCOUNTER
----- Message from Heaven Pete sent at 8/11/2023  9:24 AM CDT -----  Pt needs a refill on Ozempic and would like a call back. [phone]

## 2023-08-11 NOTE — TELEPHONE ENCOUNTER
The patient's prescription has been approved and sent to   [id number] [place] - [address]  [address]  [place] [address]  Phone: [phone] Fax: [phone]

## 2023-08-15 ENCOUNTER — TELEPHONE (OUTPATIENT)
Dept: FAMILY MEDICINE | Facility: CLINIC | Age: 63
End: 2023-08-15

## 2023-08-15 NOTE — TELEPHONE ENCOUNTER
----- Message from Jessy Newell MA sent at 8/15/2023  5:22 PM CDT -----    ----- Message -----  From: Clare Echeverria  Sent: 8/14/2023   9:48 AM CDT  To: Katya Hummel Staff    Pt had an appt on tomorrow and was rescheduled to 08/29 and wanted to know if she needed labs before her appt.  [phone]

## 2023-08-29 ENCOUNTER — TELEPHONE (OUTPATIENT)
Dept: FAMILY MEDICINE | Facility: CLINIC | Age: 63
End: 2023-08-29

## 2023-08-29 ENCOUNTER — OFFICE VISIT (OUTPATIENT)
Dept: FAMILY MEDICINE | Facility: CLINIC | Age: 63
End: 2023-08-29
Payer: MEDICARE

## 2023-08-29 VITALS
WEIGHT: 293 LBS | SYSTOLIC BLOOD PRESSURE: 118 MMHG | DIASTOLIC BLOOD PRESSURE: 72 MMHG | OXYGEN SATURATION: 98 % | BODY MASS INDEX: 45.99 KG/M2 | HEIGHT: 67 IN | HEART RATE: 71 BPM

## 2023-08-29 DIAGNOSIS — K59.09 CHRONIC CONSTIPATION: ICD-10-CM

## 2023-08-29 DIAGNOSIS — Z11.59 NEED FOR HEPATITIS C SCREENING TEST: ICD-10-CM

## 2023-08-29 DIAGNOSIS — Z12.31 VISIT FOR SCREENING MAMMOGRAM: ICD-10-CM

## 2023-08-29 DIAGNOSIS — E11.40 TYPE 2 DIABETES MELLITUS WITH DIABETIC NEUROPATHY, WITHOUT LONG-TERM CURRENT USE OF INSULIN: Primary | ICD-10-CM

## 2023-08-29 DIAGNOSIS — I10 ESSENTIAL HYPERTENSION: ICD-10-CM

## 2023-08-29 DIAGNOSIS — Z11.4 SCREENING FOR HIV (HUMAN IMMUNODEFICIENCY VIRUS): ICD-10-CM

## 2023-08-29 PROBLEM — E78.00 PURE HYPERCHOLESTEROLEMIA: Status: ACTIVE | Noted: 2023-08-25

## 2023-08-29 LAB — HBA1C MFR BLD: 6.2 %

## 2023-08-29 PROCEDURE — 3078F DIAST BP <80 MM HG: CPT | Mod: CPTII,S$GLB,,

## 2023-08-29 PROCEDURE — 4010F ACE/ARB THERAPY RXD/TAKEN: CPT | Mod: CPTII,S$GLB,,

## 2023-08-29 PROCEDURE — 3044F HG A1C LEVEL LT 7.0%: CPT | Mod: CPTII,S$GLB,,

## 2023-08-29 PROCEDURE — 3074F PR MOST RECENT SYSTOLIC BLOOD PRESSURE < 130 MM HG: ICD-10-PCS | Mod: CPTII,S$GLB,,

## 2023-08-29 PROCEDURE — 83036 POCT HEMOGLOBIN A1C: ICD-10-PCS | Mod: QW,,,

## 2023-08-29 PROCEDURE — 3074F SYST BP LT 130 MM HG: CPT | Mod: CPTII,S$GLB,,

## 2023-08-29 PROCEDURE — 3061F NEG MICROALBUMINURIA REV: CPT | Mod: CPTII,S$GLB,,

## 2023-08-29 PROCEDURE — 1159F PR MEDICATION LIST DOCUMENTED IN MEDICAL RECORD: ICD-10-PCS | Mod: CPTII,S$GLB,,

## 2023-08-29 PROCEDURE — 3044F PR MOST RECENT HEMOGLOBIN A1C LEVEL <7.0%: ICD-10-PCS | Mod: CPTII,S$GLB,,

## 2023-08-29 PROCEDURE — 1160F PR REVIEW ALL MEDS BY PRESCRIBER/CLIN PHARMACIST DOCUMENTED: ICD-10-PCS | Mod: CPTII,S$GLB,,

## 2023-08-29 PROCEDURE — 3061F PR NEG MICROALBUMINURIA RESULT DOCUMENTED/REVIEW: ICD-10-PCS | Mod: CPTII,S$GLB,,

## 2023-08-29 PROCEDURE — 1159F MED LIST DOCD IN RCRD: CPT | Mod: CPTII,S$GLB,,

## 2023-08-29 PROCEDURE — 99214 OFFICE O/P EST MOD 30 MIN: CPT | Mod: S$GLB,,,

## 2023-08-29 PROCEDURE — 4010F PR ACE/ARB THEARPY RXD/TAKEN: ICD-10-PCS | Mod: CPTII,S$GLB,,

## 2023-08-29 PROCEDURE — 83036 HEMOGLOBIN GLYCOSYLATED A1C: CPT | Mod: QW,,,

## 2023-08-29 PROCEDURE — 1160F RVW MEDS BY RX/DR IN RCRD: CPT | Mod: CPTII,S$GLB,,

## 2023-08-29 PROCEDURE — 3008F PR BODY MASS INDEX (BMI) DOCUMENTED: ICD-10-PCS | Mod: CPTII,S$GLB,,

## 2023-08-29 PROCEDURE — 3066F NEPHROPATHY DOC TX: CPT | Mod: CPTII,S$GLB,,

## 2023-08-29 PROCEDURE — 99214 PR OFFICE/OUTPT VISIT, EST, LEVL IV, 30-39 MIN: ICD-10-PCS | Mod: S$GLB,,,

## 2023-08-29 PROCEDURE — 3008F BODY MASS INDEX DOCD: CPT | Mod: CPTII,S$GLB,,

## 2023-08-29 PROCEDURE — 3078F PR MOST RECENT DIASTOLIC BLOOD PRESSURE < 80 MM HG: ICD-10-PCS | Mod: CPTII,S$GLB,,

## 2023-08-29 PROCEDURE — 3066F PR DOCUMENTATION OF TREATMENT FOR NEPHROPATHY: ICD-10-PCS | Mod: CPTII,S$GLB,,

## 2023-08-29 NOTE — TELEPHONE ENCOUNTER
----- Message from Sirena Everett sent at 8/29/2023 10:02 AM CDT -----  The patient saw Milly today. She was going to refer her to a [facility].Please call and give her the name and number.    pt's # [phone]

## 2023-08-29 NOTE — TELEPHONE ENCOUNTER
Like dexcom or teja 2? We can order and see if the insurance will cover. For PHN this will go to the pharmacy.

## 2023-08-29 NOTE — PROGRESS NOTES
SUBJECTIVE:    Patient ID: Martha Miguel is a 62 y.o. female.    Chief Complaint: 2M DM Check Up / A1C / DM Foot Exam, c/o leg pain due to ozempic, and linzess ineffective    62-year-old established female patient presents to clinic today for routine checkup for management of her multiple chronic medical conditions.  She states that she had been taken Ozempic.  She does find it is helping.  She was giving herself the injections in her legs making her legs sore.  She is changed to giving injection in her stomach and she is having no problems with her legs.  She does state that she does not think that her Linzess is working.  She is still having problems with constipation.  She is otherwise feeling well.  She has done some lifestyle changes and is trying to eat healthier and is staying very physically active.  She drinks lots of water.  She states she sleeps well at night.  She is still having some urinary incontinence on VESIcare but she states that she does not want to wait several weeks for Mirabegron to start working so she will stick with VESIcare.  Health maintenance discussed as below.    Hepatitis C Screening Never done.  Ordered today  Hemoglobin A1c due on 08/08/2023.6.2% today  Mammogram due on 09/14/2023.  Ordered today  Foot Exam due on 09/27/2023.  Completed today  Lipid Panel due on 02/08/2024.  Ordered today  Eye Exam due on 03/22/2024.  Ordered today  Colorectal Cancer Screening due on 04/22/2024  Low Dose Statin due on 07/24/2024.  Current taking  TETANUS VACCINE due on 04/06/2025  Shingles Vaccine Completed     The 10-year CVD risk score (MICHELE'Agoino, et al., 2008) is: 9.9%    Values used to calculate the score:      Age: 62 years      Sex: Female      Diabetic: Yes      Tobacco smoker: No      Systolic Blood Pressure: 118 mmHg      Is BP treated: Yes      HDL Cholesterol: 51 mg/dL      Total Cholesterol: 130 mg/dL         Office Visit on 08/29/2023   Component Date Value Ref Range Status     Hemoglobin A1C, POC 08/29/2023 6.2  % Final       Past Medical History:   Diagnosis Date    Abnormal heart rhythm     Allergy     Aneurysm of abdominal vessel 2020    Arthritis     COPD (chronic obstructive pulmonary disease)     Coronary artery disease     Depression     Diabetes mellitus, type 2     Essential hypertension 08/16/2016    Pure hypercholesterolemia 8/25/2023    Right sided sciatica 04/23/2018     Social History     Socioeconomic History    Marital status:    Tobacco Use    Smoking status: Never    Smokeless tobacco: Never   Substance and Sexual Activity    Alcohol use: No    Drug use: No    Sexual activity: Never     Past Surgical History:   Procedure Laterality Date    CHOLECYSTECTOMY      FRACTURE SURGERY      HYSTERECTOMY      INJECTION OF ANESTHETIC AGENT AROUND MEDIAL BRANCH NERVES INNERVATING LUMBAR FACET JOINT Bilateral 7/5/2018    Procedure: Block-nerve-medial branch-lumbar;  Surgeon: Arthur Orellana MD;  Location: [facility] OR;  Service: Pain Management;  Laterality: Bilateral;  L2, 3, 4, 5    TONSILLECTOMY       Family History   Family history unknown: Yes       Review of patient's allergies indicates:   Allergen Reactions    Phenytoin Hives    Dilantin [phenytoin sodium extended] Rash    Lovenox [enoxaparin] Rash and Hives       Current Outpatient Medications:     aspirin (ECOTRIN) 81 MG EC tablet, Take 81 mg by mouth once daily., Disp: , Rfl:     atorvastatin (LIPITOR) 10 MG tablet, Take 1 tablet (10 mg total) by mouth once daily., Disp: 90 tablet, Rfl: 3    blood sugar diagnostic Strp, Test glucose twice daily, Disp: 200 each, Rfl: 3    blood-glucose meter kit, Use as instructed, Disp: 1 each, Rfl: 0    L. acidophilus/L. rhamnosus (DIGESTIVE HEALTH PROBIOTIC ORAL), Take 1 tablet by mouth once daily., Disp: , Rfl:     lancets 32 gauge Misc, Test glucose twice daily, Disp: 200 each, Rfl: 3    lisinopriL (PRINIVIL,ZESTRIL) 2.5 MG tablet, Take 1 tablet (2.5 mg total) by mouth once daily.,  "Disp: 90 tablet, Rfl: 1    meloxicam (MOBIC) 15 MG tablet, Take 1 tablet (15 mg total) by mouth once daily., Disp: 30 tablet, Rfl: 2    solifenacin (VESICARE) 10 MG tablet, Take 1 tablet (10 mg total) by mouth once daily. For bladder, Disp: 90 tablet, Rfl: 1    linaCLOtide (LINZESS) 145 mcg Cap capsule, Take 1 capsule (145 mcg total) by mouth before breakfast., Disp: 90 capsule, Rfl: 3    semaglutide (OZEMPIC) 1 mg/dose (4 mg/3 mL), Inject 1 mg into the skin every 7 days., Disp: 3 mL, Rfl: 11    Review of Systems   Constitutional:  Negative for activity change, appetite change, chills, fatigue, fever and unexpected weight change.   HENT:  Positive for postnasal drip. Negative for nasal congestion, ear pain, rhinorrhea, sore throat and trouble swallowing.    Eyes:  Negative for discharge and visual disturbance.   Respiratory:  Negative for apnea, cough, shortness of breath and wheezing.    Cardiovascular:  Negative for chest pain, palpitations and leg swelling.   Gastrointestinal:  Positive for constipation. Negative for abdominal pain, blood in stool, diarrhea, nausea, vomiting and reflux.   Genitourinary:  Positive for bladder incontinence. Negative for dysuria, frequency and urgency.   Musculoskeletal:  Positive for arthralgias and back pain. Negative for gait problem, leg pain and myalgias.   Integumentary:  Negative for rash and mole/lesion.   Neurological:  Negative for dizziness, tremors, weakness, light-headedness, numbness and headaches.   Hematological:  Does not bruise/bleed easily.   Psychiatric/Behavioral:  Negative for dysphoric mood, sleep disturbance and suicidal ideas. The patient is not nervous/anxious.            Objective:      Vitals:    08/29/23 0902   BP: 118/72   Pulse: 71   SpO2: 98%   Weight: (!) 146.1 kg (322 lb)   Height: 5' 7" (1.702 m)     Physical Exam  Vitals and nursing note reviewed.   Constitutional:       General: She is not in acute distress.     Appearance: Normal appearance. She " is well-developed. She is obese.   HENT:      Head: Normocephalic and atraumatic.      Right Ear: External ear normal.      Left Ear: External ear normal.      Nose: Nose normal. No congestion or rhinorrhea.      Mouth/Throat:      Mouth: Mucous membranes are moist.      Pharynx: Oropharynx is clear.   Eyes:      General: No scleral icterus.        Right eye: No discharge.         Left eye: No discharge.      Pupils: Pupils are equal, round, and reactive to light.   Neck:      Thyroid: No thyromegaly.      Vascular: No carotid bruit.   Cardiovascular:      Rate and Rhythm: Normal rate and regular rhythm.      Pulses: Normal pulses.           Dorsalis pedis pulses are 2+ on the right side and 2+ on the left side.      Heart sounds: Normal heart sounds. No murmur heard.  Pulmonary:      Effort: Pulmonary effort is normal.      Breath sounds: Normal breath sounds. No wheezing or rales.   Abdominal:      General: Bowel sounds are normal. There is no distension.      Palpations: Abdomen is soft.      Tenderness: There is no abdominal tenderness.   Musculoskeletal:         General: Normal range of motion.      Cervical back: Normal range of motion and neck supple.      Lumbar back: Normal. No spasms.      Right lower leg: No edema.      Left lower leg: No edema.      Comments: Bends 90 degrees at  waist   Feet:      Right foot:      Protective Sensation: 4 sites tested.  4 sites sensed.      Skin integrity: Skin integrity normal.      Toenail Condition: Right toenails are normal.      Left foot:      Protective Sensation: 4 sites tested.  4 sites sensed.      Skin integrity: Skin integrity normal.      Toenail Condition: Left toenails are normal.   Skin:     General: Skin is warm and dry.      Capillary Refill: Capillary refill takes less than 2 seconds.      Coloration: Skin is not jaundiced or pale.      Findings: No lesion or rash.   Neurological:      General: No focal deficit present.      Mental Status: She is alert  and oriented to person, place, and time. Mental status is at baseline.      Cranial Nerves: No cranial nerve deficit.      Motor: No weakness.      Coordination: Coordination normal.      Gait: Gait normal.   Psychiatric:         Mood and Affect: Mood normal.         Behavior: Behavior normal.         Thought Content: Thought content normal.         Judgment: Judgment normal.           Assessment:       1. Type 2 diabetes mellitus with diabetic neuropathy, without long-term current use of insulin    2. Visit for screening mammogram    3. Essential hypertension    4. Need for hepatitis C screening test    5. Screening for HIV (human immunodeficiency virus)    6. Chronic constipation         Plan:       Type 2 diabetes mellitus with diabetic neuropathy, without long-term current use of insulin  6.2% today. Patient has changed her lifestyle and is doing exercise.  She joined a gym.  She has lost total 30 lb corn her.  Continue Ozempic.  Increase dose.  Patient would like to see the diabetic educator.  -     Hemoglobin A1C, POCT  -     Foot Exam Performed  -     Microalbumin/Creatinine Ratio, Urine; Future; Expected date: 08/29/2023  -     semaglutide (OZEMPIC) 1 mg/dose (4 mg/3 mL); Inject 1 mg into the skin every 7 days.  Dispense: 3 mL; Refill: 11  -     Ambulatory referral/consult to Ophthalmology; Future; Expected date: 02/29/2024  -     Ambulatory referral/consult to Diabetes Education; Future; Expected date: 09/05/2023    Visit for screening mammogram  Agrees to screening  -     Mammo Digital Screening Bilat w/ Aj; Future; Expected date: 09/29/2023    Essential hypertension  Well controlled on current regimen.  Blood pressure 118/72 today.  Labs for surveillance  -     TSH; Future; Expected date: 08/29/2023  -     T4, Free; Future; Expected date: 08/29/2023  -     Microalbumin/Creatinine Ratio, Urine; Future; Expected date: 08/29/2023  -     Comprehensive Metabolic Panel; Future; Expected date: 08/29/2023  -      Urinalysis, Reflex to Urine Culture Urine, Clean Catch; Future; Expected date: 08/29/2023    Need for hepatitis C screening test  Agrees to screening  -     Hepatitis C Antibody; Future; Expected date: 08/29/2023    Screening for HIV (human immunodeficiency virus)  Agrees to screening  -     HIV 1/2 Ag/Ab (4th Gen); Future; Expected date: 08/29/2023    Chronic constipation  Patient reports she stopped taking Linzess visit was not helping.  Advised patient that we can increase her dose.  She states that she did not know it came and a higher dose.  She would like to try it.  -     linaCLOtide (LINZESS) 145 mcg Cap capsule; Take 1 capsule (145 mcg total) by mouth before breakfast.  Dispense: 90 capsule; Refill: 3      Follow up in about 3 months (around 11/29/2023) for Diabetic Check Up.        8/30/2023 Milly Llamas

## 2023-08-29 NOTE — TELEPHONE ENCOUNTER
Ordered. I also meant to ask you, so thank you for the reminder- she wants a continuous glucose monitor. Can you look into this?

## 2023-08-29 NOTE — TELEPHONE ENCOUNTER
Spoke with pt. Advised pt I can give her contact info for nutritionist. Pt states she is driving right now and it is raining. She would like portal message with info. Message sent.

## 2023-08-30 PROBLEM — K59.09 CHRONIC CONSTIPATION: Status: ACTIVE | Noted: 2023-08-30

## 2023-09-01 ENCOUNTER — CLINICAL SUPPORT (OUTPATIENT)
Dept: DIABETES | Facility: CLINIC | Age: 63
End: 2023-09-01
Payer: MEDICARE

## 2023-09-01 DIAGNOSIS — E11.40 TYPE 2 DIABETES MELLITUS WITH DIABETIC NEUROPATHY, WITHOUT LONG-TERM CURRENT USE OF INSULIN: ICD-10-CM

## 2023-09-01 PROCEDURE — G0108 DIAB MANAGE TRN  PER INDIV: HCPCS | Mod: S$GLB,,, | Performed by: NUTRITIONIST

## 2023-09-01 PROCEDURE — 99999 PR PBB SHADOW E&M-EST. PATIENT-LVL III: ICD-10-PCS | Mod: PBBFAC,,, | Performed by: NUTRITIONIST

## 2023-09-01 PROCEDURE — G0108 PR DIAB MANAGE TRN  PER INDIV: ICD-10-PCS | Mod: S$GLB,,, | Performed by: NUTRITIONIST

## 2023-09-01 PROCEDURE — 99999 PR PBB SHADOW E&M-EST. PATIENT-LVL III: CPT | Mod: PBBFAC,,, | Performed by: NUTRITIONIST

## 2023-09-01 NOTE — PROGRESS NOTES
Diabetes Care Specialist Progress Note  Author: Claudia Peña RD  Date: 9/1/2023    Referral 8/29/23    Program Intake  Reason for Diabetes Program Visit:: Initial Diabetes Assessment  Current diabetes risk level:: moderate (T2DM Outcomes Risk=2)  In the last 12 months, have you:: none  Permission to speak with others about care:: no    Lab Results   Component Value Date    HGBA1C 6.4 (H) 02/08/2023       Clinical    Patient Health Rating  Compared to other people your age, how would you rate your health?: Good    Problem Review  Reviewed Problem List with Patient: yes  Active comorbidities affecting diabetes self-care.: yes  Comorbidities: Cardiovascular Disease, Hypertension  Reviewed health maintenance: yes    Clinical Assessment  Current Diabetes Treatment: Injectable (1 mg Ozempic on SUN)  Have you ever experienced hypoglycemia (low blood sugar)?: no  Have you ever experienced hyperglycemia (high blood sugar)?: no    Medication Information  How do you obtain your medications?: Patient drives  How many days a week do you miss your medications?: Never  Do you sometimes have difficulty refilling your medications?: No  Medication adherence impacting ability to self-manage diabetes?: No    Labs  Do you have regular lab work to monitor your medications?: Yes  Type of Regular Lab Work: A1c, Cholesterol, CBC, Other  Where do you get your labs drawn?: [facility]  Lab Compliance Barriers: No    Nutritional Status  Diet: Regular  Meal Plan 24 Hour Recall: Breakfast, Lunch, Dinner, Snack (drinks only water)  Meal Plan 24 Hour Recall - Breakfast:  (10 AM-- grits, eggs OR Special K w/2% milk)  Meal Plan 24 Hour Recall - Lunch:  (skips OR 1/2 turkey sandwich)  Meal Plan 24 Hour Recall - Dinner:  (spaghetti, meat sauce, green beans)  Meal Plan 24 Hour Recall - Snack:  (HS--judy crackers or jalapeno chiise nips)  Change in appetite?: No  Dentation:: Intact  Recent Changes in Weight: No Recent Weight Change  Current nutritional  status an area of need that is impacting patient's ability to self-manage diabetes?: No    Additional Social History    Support  Does anyone support you with your diabetes care?: yes  Who supports you?: self  Who takes you to your medical appointments?: self  Does the current support meet the patient's needs?: Yes  Is Support an area impacting ability to self-manage diabetes?: No    Access to Mass Media & Technology  Does the patient have access to any of the following devices or technologies?: Smart phone  Media or technology needs impacting ability to self-manage diabetes?: No    Cognitive/Behavioral Health  Alert and Oriented: Yes  Difficulty Thinking: No  Requires Prompting: No  Requires assistance for routine expression?: No  Cognitive or behavioral barriers impacting ability to self-manage diabetes?: No    Culture/Hinduism  Culture or Hinduism beliefs that may impact ability to access healthcare: No    Communication  Language preference: English  Hearing Problems: No  Vision Problems: No  Communication needs impacting ability to self-manage diabetes?: No    Health Literacy  Preferred Learning Method: Face to Face  How often do you need to have someone help you read instructions, pamphlets, or written material from your doctor or pharmacy?: Never  Health literacy needs impacting ability to self-manage diabetes?: No      Diabetes Self-Management Skills Assessment    Diabetes Disease Process/Treatment Options  Patient/caregiver able to state what happens when someone has diabetes.: yes  Patient/caregiver knows what type of diabetes they have.: yes  Diabetes Type : Type II  Patient/caregiver able to identify at least three signs and symptoms of diabetes.: yes  Identified signs and symptoms:: blurred vision, fatigue, frequent infections, frequent urination, increased thirst  Patient able to identify at least three risk factors for diabetes.: yes  Identified risk factors:: age over 40, being overweight, ethnicity,  reduced activity  Diabetes Disease Process/Treatment Options: Skills Assessment Completed: Yes  Assessment indicates:: Adequate understanding  Area of need?: No    Nutrition/Healthy Eating  Challenges to healthy eating:: portion control  Method of carbohydrate measurement:: no method  Patient can identify foods that impact blood sugar.: yes  Patient-identified foods:: fruit/fruit juice, milk, soda, starchy vegetables (corn, peas, beans), starches (bread, pasta, rice, cereal), sweets, yogurt  Nutrition/Healthy Eating Skills Assessment Completed:: Yes  Assessment indicates:: Instruction Needed  Area of need?: Yes    Physical Activity/Exercise  Patient's daily activity level:: sedentary  Patient formally exercises outside of work.: no  Reasons for not exercising::  (pt previously did exercise but has some back pain with movement; none when she sits)  Patient can identify forms of physical activity.: yes  Stated forms of physical activity:: any movement performed by muscles that uses energy  Patient can identify reasons why exercise/physical activity is important in diabetes management.: yes  Identified reasons:: tones muscles, strengthens heart, muscles, and bones, lowers blood glucose, blood pressure, and cholesterol, lowers risk of heart disease and stroke, relieves stress  Physical Activity/Exercise Skills Assessment Completed: : Yes  Assessment indicates:: Instruction Needed  Area of need?: Yes    Medications  Patient is able to describe current diabetes management routine.: yes  Diabetes management routine:: injectable medications (1 mg Ozempic on SUN)  Patient is able to identify current diabetes medications, dosages, and appropriate timing of medications.: yes  Patient understands the purpose of the medications taken for diabetes.: yes  Patient reports problems or concerns with current medication regimen.: no  Medication Skills Assessment Completed:: Yes  Assessment indicates:: Adequate understanding  Area of  need?: No    Home Blood Glucose Monitoring  Patient states that blood sugar is checked at home daily.: yes  Monitoring Method:: home glucometer  How often do you check your blood sugar?: Twice a day  When do you check your blood sugar?: Before breakfast, Before dinner  When you check what is your typical blood sugar range? :  (fasting [place]=452 today  never >112)  Blood glucose logs:: encouraged to bring logs to provider visits, no  Blood glucose logs reviewed today?: no  Home Blood Glucose Monitoring Skills Assessment Completed: : Yes  Assessment indicates:: Adequate understanding  Area of need?: No    Acute Complications  Patient is able to identify types of acute complications: No  Acute Complications Skills Assessment Completed: : No  Deffered due to:: Time  Area of need?: No    Chronic Complications  Patient can identify major chronic complications of diabetes.: yes  Stated chronic complications:: heart disease/heart attack, kidney disease, neuropathy/nerve damage, retinopathy, stroke  Patient can identify ways to prevent or delay diabetes complications.: yes  Stated ways to prevent complications:: healthy eating and regular activity, controlling blood sugar  Patient is aware that having diabetes increases risk of heart disease?: Yes  Patient is aware that heart disease is the leading cause of death and disability in people with diabetes?: Yes  Patient able to state risk factors for heart disease?: Yes  Patient stated risk factors for heart disease:: High blood pressure, High cholesterol, Diet, Limited activity, Medication non-adherance, Having diabetes  Patient is taking statin?: Yes  Chronic Complications Skills Assessment Completed: : Yes  Assessment indicates:: Adequate understanding  Area of need?: No    Psychosocial/Coping  Patient can identify ways of coping with chronic disease.: yes  Patient-stated ways of coping with chronic disease:: support from loved ones  Psychosocial/Coping Skills Assessment  Completed: : Yes  Assessment indicates:: Adequate understanding  Area of need?: No      Diabetes Self Support Plan         Assessment Summary and Plan    Based on today's diabetes care assessment, the following areas of need were identified:          9/1/2023    12:01 AM   Social   Support No   Access to Mass Media/Tech No   Cognitive/Behavioral Health No   Culture/Pentecostalism No   Communication No   Health Literacy No            9/1/2023    12:01 AM   Clinical   Medication Adherence No   Lab Compliance No   Nutritional Status No            9/1/2023    12:01 AM   Diabetes Self-Management Skills   Diabetes Disease Process/Treatment Options No   Nutrition/Healthy Eating Yes--see Care Plan   Physical Activity/Exercise Yes--pt has membership at [employer] and could use pool to walk for exercise; encouraged to do so   Medication No   Home Blood Glucose Monitoring No   Acute Complications No   Chronic Complications No   Psychosocial/Coping No          Today's interventions were provided through individual discussion, instruction, and written materials were provided.      Patient verbalized understanding of instruction and written materials.  Pt was able to return back demonstration of instructions today. Patient understood key points, needs reinforcement and further instruction.     Diabetes Self-Management Care Plan:    Today's Diabetes Self-Management Care Plan was developed with Martha's input. Martha has agreed to work toward the following goal(s) to improve his/her overall diabetes control.      Care Plan: Diabetes Management   Updates made since 8/2/2023 12:00 AM        Problem: Healthy Eating         Goal: Eat 2-3 meals daily with 30 g/2 servings of Carbohydrate per meal.    Start Date: 9/1/2023   Expected End Date: 12/1/2023   Priority: Medium   Barriers: No Barriers Identified   Note:    Pt is concerned about eating healthy for managing her DM .  She currently only eats twice a day but will have mid=morn and/or HS  "snack.  Reviewed all aspects of healthy diet, with focus on having a consistent amount of carbs at each meal, combining carbs w/PRO; increasing non-starchy veggie.  Pt admits to having larger portions of grits (she loves them), beans & rice.  Encouraged pt to find a bowl or coffee cup that is 1/2 cup and 1 cup. Pt admits to being "lazy in the kitchen" so need to try to make this convenient and not time consuming.  Reviewed label reading so pt can make sure she has foods that she knows are appropriate. Drinks only water.  Discussed Carb Smart IC, Premier PRO.  REC pt make sure she has PRO when she has judy crackers as a snack (I.e. PNB) and to check portions by reading labels.         Task: Reviewed the sources and role of Carbohydrate, Protein, and Fat and how each nutrient impacts blood sugar. Completed 9/1/2023        Task: Provided visual examples using dry measuring cups, food models, and other familiar objects such as computer mouse, deck or cards, tennis ball etc. to help with visualization of portions. Completed 9/1/2023        Task: Explained how to count carbohydrates using the food label and the use of dry measuring cups for accurate carb counting. Completed 9/1/2023        Task: Discussed strategies for choosing healthier menu options when dining out. Completed 9/1/2023        Task: Recommended replacing beverages containing high sugar content with noncaloric/sugar free options and/or water. Completed 9/1/2023        Task: Review the importance of balancing carbohydrates with each meal using portion control techniques to count servings of carbohydrate and label reading to identify serving size and amount of total carbs per serving. Completed 9/1/2023        Task: Provided Sample plate method and reviewed the use of the plate to estimate amounts of carbohydrate per meal. Completed 9/1/2023          Follow Up Plan     Follow up if symptoms worsen or fail to improve.  Pt knows how to reach Educator by " phone or My Chart.    Today's care plan and follow up schedule was discussed with patient.  Martha verbalized understanding of the care plan, goals, and agrees to follow up plan.        The patient was encouraged to communicate with his/her health care provider/physician and care team regarding his/her condition(s) and treatment.  I provided the patient with my contact information today and encouraged to contact me via phone or [facility] Patient Portal as needed.     Length of Visit   Total Time: 60 Minutes

## 2023-09-19 ENCOUNTER — HOSPITAL ENCOUNTER (OUTPATIENT)
Dept: RADIOLOGY | Facility: HOSPITAL | Age: 63
Discharge: HOME | End: 2023-09-19
Payer: MEDICARE

## 2023-09-19 DIAGNOSIS — Z12.31 VISIT FOR SCREENING MAMMOGRAM: ICD-10-CM

## 2023-09-19 PROCEDURE — 77067 SCR MAMMO BI INCL CAD: CPT | Mod: TC,PO

## 2023-09-20 ENCOUNTER — TELEPHONE (OUTPATIENT)
Dept: FAMILY MEDICINE | Facility: CLINIC | Age: 63
End: 2023-09-20

## 2023-09-20 NOTE — TELEPHONE ENCOUNTER
----- Message from ANGEL Mares-PONCHO sent at 9/20/2023  9:43 AM CDT -----  Please let Ms. Miguel know that her mammogram was normal. Thanks.

## 2023-11-29 ENCOUNTER — OFFICE VISIT (OUTPATIENT)
Dept: FAMILY MEDICINE | Facility: CLINIC | Age: 63
End: 2023-11-29
Payer: MEDICARE

## 2023-11-29 VITALS
BODY MASS INDEX: 43.4 KG/M2 | HEIGHT: 69 IN | SYSTOLIC BLOOD PRESSURE: 128 MMHG | OXYGEN SATURATION: 98 % | HEART RATE: 68 BPM | DIASTOLIC BLOOD PRESSURE: 80 MMHG | WEIGHT: 293 LBS

## 2023-11-29 DIAGNOSIS — I71.23 ANEURYSM OF DESCENDING THORACIC AORTA WITHOUT RUPTURE: ICD-10-CM

## 2023-11-29 DIAGNOSIS — H66.93 BILATERAL OTITIS MEDIA, UNSPECIFIED OTITIS MEDIA TYPE: ICD-10-CM

## 2023-11-29 DIAGNOSIS — M51.36 LUMBAR DEGENERATIVE DISC DISEASE: ICD-10-CM

## 2023-11-29 DIAGNOSIS — E55.9 VITAMIN D DEFICIENCY: ICD-10-CM

## 2023-11-29 DIAGNOSIS — K59.09 CHRONIC CONSTIPATION: ICD-10-CM

## 2023-11-29 DIAGNOSIS — Z86.010 HISTORY OF COLON POLYPS: ICD-10-CM

## 2023-11-29 DIAGNOSIS — Z23 NEED FOR INFLUENZA VACCINATION: ICD-10-CM

## 2023-11-29 DIAGNOSIS — E78.00 PURE HYPERCHOLESTEROLEMIA: ICD-10-CM

## 2023-11-29 DIAGNOSIS — M46.96 INFLAMMATORY SPONDYLOPATHY OF LUMBAR REGION: ICD-10-CM

## 2023-11-29 DIAGNOSIS — Z29.11 NEED FOR RSV IMMUNIZATION: ICD-10-CM

## 2023-11-29 DIAGNOSIS — I10 ESSENTIAL HYPERTENSION: ICD-10-CM

## 2023-11-29 DIAGNOSIS — M54.31 RIGHT SIDED SCIATICA: ICD-10-CM

## 2023-11-29 DIAGNOSIS — K57.30 DIVERTICULA OF COLON: ICD-10-CM

## 2023-11-29 DIAGNOSIS — E66.01 MORBID OBESITY: ICD-10-CM

## 2023-11-29 DIAGNOSIS — M25.551 RIGHT HIP PAIN: ICD-10-CM

## 2023-11-29 DIAGNOSIS — E11.42 DIABETIC POLYNEUROPATHY ASSOCIATED WITH TYPE 2 DIABETES MELLITUS: ICD-10-CM

## 2023-11-29 DIAGNOSIS — L60.0 INGROWN TOENAIL: ICD-10-CM

## 2023-11-29 DIAGNOSIS — E11.9 TYPE 2 DIABETES MELLITUS WITHOUT COMPLICATION, WITHOUT LONG-TERM CURRENT USE OF INSULIN: Primary | ICD-10-CM

## 2023-11-29 LAB — HBA1C MFR BLD: 5.9 %

## 2023-11-29 PROCEDURE — 3079F PR MOST RECENT DIASTOLIC BLOOD PRESSURE 80-89 MM HG: ICD-10-PCS | Mod: CPTII,S$GLB,,

## 2023-11-29 PROCEDURE — 3061F PR NEG MICROALBUMINURIA RESULT DOCUMENTED/REVIEW: ICD-10-PCS | Mod: CPTII,S$GLB,,

## 2023-11-29 PROCEDURE — 3074F PR MOST RECENT SYSTOLIC BLOOD PRESSURE < 130 MM HG: ICD-10-PCS | Mod: CPTII,S$GLB,,

## 2023-11-29 PROCEDURE — G0008 ADMIN INFLUENZA VIRUS VAC: HCPCS | Mod: S$GLB,,,

## 2023-11-29 PROCEDURE — 4010F ACE/ARB THERAPY RXD/TAKEN: CPT | Mod: CPTII,S$GLB,,

## 2023-11-29 PROCEDURE — 90686 IIV4 VACC NO PRSV 0.5 ML IM: CPT | Mod: S$GLB,,,

## 2023-11-29 PROCEDURE — 83036 POCT HEMOGLOBIN A1C: ICD-10-PCS | Mod: QW,,,

## 2023-11-29 PROCEDURE — 3008F PR BODY MASS INDEX (BMI) DOCUMENTED: ICD-10-PCS | Mod: CPTII,S$GLB,,

## 2023-11-29 PROCEDURE — 99214 OFFICE O/P EST MOD 30 MIN: CPT | Mod: S$GLB,,,

## 2023-11-29 PROCEDURE — 3044F PR MOST RECENT HEMOGLOBIN A1C LEVEL <7.0%: ICD-10-PCS | Mod: CPTII,S$GLB,,

## 2023-11-29 PROCEDURE — 3061F NEG MICROALBUMINURIA REV: CPT | Mod: CPTII,S$GLB,,

## 2023-11-29 PROCEDURE — 3044F HG A1C LEVEL LT 7.0%: CPT | Mod: CPTII,S$GLB,,

## 2023-11-29 PROCEDURE — 3074F SYST BP LT 130 MM HG: CPT | Mod: CPTII,S$GLB,,

## 2023-11-29 PROCEDURE — 83036 HEMOGLOBIN GLYCOSYLATED A1C: CPT | Mod: QW,,,

## 2023-11-29 PROCEDURE — 3008F BODY MASS INDEX DOCD: CPT | Mod: CPTII,S$GLB,,

## 2023-11-29 PROCEDURE — 3066F PR DOCUMENTATION OF TREATMENT FOR NEPHROPATHY: ICD-10-PCS | Mod: CPTII,S$GLB,,

## 2023-11-29 PROCEDURE — 3066F NEPHROPATHY DOC TX: CPT | Mod: CPTII,S$GLB,,

## 2023-11-29 PROCEDURE — 3079F DIAST BP 80-89 MM HG: CPT | Mod: CPTII,S$GLB,,

## 2023-11-29 PROCEDURE — 90686 FLU VACCINE (QUAD) GREATER THAN OR EQUAL TO 3YO PRESERVATIVE FREE IM: ICD-10-PCS | Mod: S$GLB,,,

## 2023-11-29 PROCEDURE — G0008 FLU VACCINE (QUAD) GREATER THAN OR EQUAL TO 3YO PRESERVATIVE FREE IM: ICD-10-PCS | Mod: S$GLB,,,

## 2023-11-29 PROCEDURE — 99214 PR OFFICE/OUTPT VISIT, EST, LEVL IV, 30-39 MIN: ICD-10-PCS | Mod: S$GLB,,,

## 2023-11-29 PROCEDURE — 4010F PR ACE/ARB THEARPY RXD/TAKEN: ICD-10-PCS | Mod: CPTII,S$GLB,,

## 2023-11-29 RX ORDER — GABAPENTIN 100 MG/1
100 CAPSULE ORAL 3 TIMES DAILY PRN
Qty: 90 CAPSULE | Refills: 11 | Status: SHIPPED | OUTPATIENT
Start: 2023-11-29 | End: 2024-11-28

## 2023-11-29 RX ORDER — LISINOPRIL 2.5 MG/1
2.5 TABLET ORAL DAILY
Qty: 90 TABLET | Refills: 3 | Status: SHIPPED | OUTPATIENT
Start: 2023-11-29 | End: 2024-11-28

## 2023-11-29 RX ORDER — ATORVASTATIN CALCIUM 10 MG/1
10 TABLET, FILM COATED ORAL DAILY
Qty: 90 TABLET | Refills: 3 | Status: SHIPPED | OUTPATIENT
Start: 2024-01-15 | End: 2023-11-30

## 2023-11-29 RX ORDER — NEOMYCIN SULFATE, POLYMYXIN B SULFATE AND HYDROCORTISONE 10; 3.5; 1 MG/ML; MG/ML; [USP'U]/ML
3 SUSPENSION/ DROPS AURICULAR (OTIC) 3 TIMES DAILY
Qty: 10 ML | Refills: 0 | Status: SHIPPED | OUTPATIENT
Start: 2023-11-29 | End: 2023-12-06

## 2023-11-29 RX ORDER — RESPIRATORY SYNCYTIAL VISUS VACCINE RECOMBINANT, ADJUVANTED 120MCG/0.5
0.5 KIT INTRAMUSCULAR ONCE
Qty: 0.5 ML | Refills: 0 | Status: SHIPPED | OUTPATIENT
Start: 2023-11-29 | End: 2023-11-29

## 2023-11-29 RX ORDER — TIRZEPATIDE 2.5 MG/.5ML
2.5 INJECTION, SOLUTION SUBCUTANEOUS
Qty: 4 PEN | Refills: 11 | Status: SHIPPED | OUTPATIENT
Start: 2023-11-29 | End: 2024-01-29

## 2023-11-30 ENCOUNTER — TELEPHONE (OUTPATIENT)
Dept: FAMILY MEDICINE | Facility: CLINIC | Age: 63
End: 2023-11-30

## 2023-11-30 DIAGNOSIS — E78.00 PURE HYPERCHOLESTEROLEMIA: Primary | ICD-10-CM

## 2023-11-30 LAB
BASOPHILS # BLD AUTO: 33 CELLS/UL (ref 0–200)
BASOPHILS NFR BLD AUTO: 0.6 %
CHOLEST SERPL-MCNC: 190 MG/DL
CHOLEST/HDLC SERPL: 3.7 (CALC)
EOSINOPHIL # BLD AUTO: 154 CELLS/UL (ref 15–500)
EOSINOPHIL NFR BLD AUTO: 2.8 %
ERYTHROCYTE [DISTWIDTH] IN BLOOD BY AUTOMATED COUNT: 12.6 % (ref 11–15)
HCT VFR BLD AUTO: 41.4 % (ref 35–45)
HDLC SERPL-MCNC: 52 MG/DL
HGB BLD-MCNC: 13.6 G/DL (ref 11.7–15.5)
LDLC SERPL CALC-MCNC: 114 MG/DL (CALC)
LYMPHOCYTES # BLD AUTO: 1925 CELLS/UL (ref 850–3900)
LYMPHOCYTES NFR BLD AUTO: 35 %
MCH RBC QN AUTO: 30.5 PG (ref 27–33)
MCHC RBC AUTO-ENTMCNC: 32.9 G/DL (ref 32–36)
MCV RBC AUTO: 92.8 FL (ref 80–100)
MONOCYTES # BLD AUTO: 556 CELLS/UL (ref 200–950)
MONOCYTES NFR BLD AUTO: 10.1 %
NEUTROPHILS # BLD AUTO: 2833 CELLS/UL (ref 1500–7800)
NEUTROPHILS NFR BLD AUTO: 51.5 %
NONHDLC SERPL-MCNC: 138 MG/DL (CALC)
PLATELET # BLD AUTO: 319 THOUSAND/UL (ref 140–400)
PMV BLD REES-ECKER: 10.9 FL (ref 7.5–12.5)
RBC # BLD AUTO: 4.46 MILLION/UL (ref 3.8–5.1)
TRIGL SERPL-MCNC: 127 MG/DL
WBC # BLD AUTO: 5.5 THOUSAND/UL (ref 3.8–10.8)

## 2023-11-30 RX ORDER — ATORVASTATIN CALCIUM 20 MG/1
20 TABLET, FILM COATED ORAL DAILY
Qty: 90 TABLET | Refills: 3 | Status: SHIPPED | OUTPATIENT
Start: 2023-11-30 | End: 2024-11-29

## 2023-11-30 NOTE — TELEPHONE ENCOUNTER
Spoke with patient gave lab results verbatim per Monet LAGUNAS Patient verbalized understanding and stated she had not taken atorvastatin for 1 week. Patient will start taking 2 atorvastatin daily. No further questions.

## 2023-11-30 NOTE — PROGRESS NOTES
Cardiac Catheterization Appropriate Use    [facility] Clinical Frailty Scale     [] 1. Very Fit  [] 2. Well  [x] 3. Managing Well  [] 4. Vulnerable  [] 5. Mildly Frail  [] 6. Moderately Frail [] 7. Severely Frail  [] 8. Very Severely Frail  [] 9. Terminally III        Heart Failure  Yes: NYHA Class III: Significant HF symptoms/activity intolerance (Only able to do light housework, can walk slowly on level ground)    If Yes,  Newly Diagnosed? [x]  Yes or []  No     If Yes, Heart Failure Type? []  Diastolic  [x]  Systolic  [] Unknown         Stress Test Peformed  []  Yes  []  No         If yes, specify test performed and must include risk of ischemia    StressTest Type Test Result Risk of Ischemia   [] Standard Exercise ST             (without imaging)  [] Stress Echo  [] ST Nuclear   [] ST with CMR    [] Positive                   []Negative  [] Indeterminate  [] Unavailable [] High   [] Intermediate  [] Low  [] Unavailable        [] Cardiac CTA (only pick one)     Indication(s) for Cath Lab Visit  [] 3VD   [] 2VD   [] 1VD   [] No Disease       (select all that apply)   [] Indeterminant      [] ACS<=24 hours    [] ACS>24 hours  []New onset angina<=2 months  [] Worsening Angina  [] Resuscitated Cardiac Arrest   [] Stable Known CAD  [x] Suspected CAD  [] Valvular Disease  [] Pericardial Disease  [] Pre-Operative Evaluation  []  Syncope []Post-Cardiac Transplant  []Cardiac Arrhythmias   [x] Cardiomyopathy  [] LV dysfunction  [] Evaluation for Exercise Clearance  [] Other         Chest Pain Symptoms     [] Typical Angina      []   Atypical                                Angina      [x] Non-anginal Chest Pain []Asymptomatic      Cardiovascular Instability  [] Yes  [x]  No     If yes, specify instability type (select all that apply)    [] Persistent Ischemic                     Symptoms(chest pain)   [] Hemodynamic Instability(not        Cardiogenic shock)  [] Cardiogenic Shock  [] Refractory Cardiogenic Shock   [] Acute Heart  Please let Mrs. Miguel know that her labs look ok, but her cholesterol levels have gone up, instead of down. They are still not dangerously high, but I would like her bad cholesterol, her LDL, to be less than 70-80, and it is 114. I am going to advise increasing her atorvastatin from 10mg to 20mg. Let her know she can just take 2 tablets of what she has at home and I will send a new prescription for when she runs out. Failure Symptoms  [] Ventricular Arrhythmia        Evaluation for Surgery []  Cardiac Surgery        []  Non-Cardiac Surgery      Functional Capacity [x]<4 METS  []>= 4 METS without symptoms  []  >=4 mets with symptoms    []  Unknown     Surgical Risk []  Low     []  Intermediate    []High Risk Vascular  []  High Risk Non-Vascular     Solid Organ Transplant Surgery   [x]  No  []  Yes                  If yes, Donor   []  No  []  Yes                   If yes, Organ  []  Heart  []  Kidney    []  Liver  []  Lung   []  Pancreas  []  Other Organ

## 2023-11-30 NOTE — TELEPHONE ENCOUNTER
----- Message from ANGEL Mares-PONCHO sent at 11/30/2023  8:07 AM CST -----  Please let Mrs. Miguel know that her labs look ok, but her cholesterol levels have gone up, instead of down. They are still not dangerously high, but I would like her bad cholesterol, her LDL, to be less than 70-80, and it is 114. I am going to advise increasing her atorvastatin from 10mg to 20mg. Let her know she can just take 2 tablets of what she has at home and I will send a new prescription for when she runs out.

## 2023-12-17 PROBLEM — K57.30 DIVERTICULA OF COLON: Status: ACTIVE | Noted: 2023-12-17

## 2023-12-17 PROBLEM — M25.551 RIGHT HIP PAIN: Status: ACTIVE | Noted: 2023-12-17

## 2023-12-17 PROBLEM — E11.42 DIABETIC POLYNEUROPATHY ASSOCIATED WITH TYPE 2 DIABETES MELLITUS: Status: ACTIVE | Noted: 2023-12-17

## 2023-12-17 PROBLEM — L60.0 INGROWN TOENAIL: Status: ACTIVE | Noted: 2023-12-17

## 2023-12-17 PROBLEM — H66.93 BILATERAL OTITIS MEDIA: Status: ACTIVE | Noted: 2023-12-17

## 2023-12-17 NOTE — PROGRESS NOTES
SUBJECTIVE:    Patient ID: Martha Miguel is a 63 y.o. female.    Chief Complaint: Follow-up (Bottles brought//Pt here for diabetic check up//discuss pain in right hip//JL)    63-year-old established female patient presents to clinic today for routine checkup.  She does have an acute complaint today of hip pain.  Her right hip has been bothering her for some time.  She states that it hurts to lay on that side for a long period of time.  It is affecting her gait.  She has tried over-the-counter medications at home.  She is interested in trying physical therapy.  She does have a known history of lumbar disc disease with right-sided sciatica.    She also complains of ear fullness and pain for the last several days. Did have some sinus congestion that has resolved.    She does not need refills of her medication today.    Health maintenance and risks discussed below:    Hepatitis C Screening Never done  Eye Exam due on 03/22/2024  Colorectal Cancer Screening due on 04/22/2024  Hemoglobin A1c due on 05/29/2024  Mammogram due on 09/19/2024  Foot Exam due on 11/29/2024  Lipid Panel due on 11/29/2024  High Dose Statin due on 11/30/2024  TETANUS VACCINE due on 04/06/2025  Shingles Vaccine Completed   RSV vaccine discussed and ordered    The 10-year CVD risk score (MICHELE'Agoino, et al., 2008) is: 19.2%    Values used to calculate the score:      Age: 63 years      Sex: Female      Diabetic: Yes      Tobacco smoker: No      Systolic Blood Pressure: 128 mmHg      Is BP treated: Yes      HDL Cholesterol: 52 mg/dL      Total Cholesterol: 190 mg/dL         Follow-up  Associated symptoms include arthralgias. Pertinent negatives include no abdominal pain, chest pain, chills, congestion, coughing, fatigue, fever, headaches, myalgias, nausea, numbness, rash, sore throat, vomiting or weakness.       Office Visit on 11/29/2023   Component Date Value Ref Range Status    Hemoglobin A1C, POC 11/29/2023 5.9  % Final    WBC 11/29/2023 5.5  3.8  - 10.8 Thousand/uL Final    RBC 11/29/2023 4.46  3.80 - 5.10 Million/uL Final    Hemoglobin 11/29/2023 13.6  11.7 - 15.5 g/dL Final    Hematocrit 11/29/2023 41.4  35.0 - 45.0 % Final    MCV 11/29/2023 92.8  80.0 - 100.0 fL Final    MCH 11/29/2023 30.5  27.0 - 33.0 pg Final    MCHC 11/29/2023 32.9  32.0 - 36.0 g/dL Final    RDW 11/29/2023 12.6  11.0 - 15.0 % Final    Platelets 11/29/2023 319  140 - 400 Thousand/uL Final    MPV 11/29/2023 10.9  7.5 - 12.5 fL Final    Neutrophils, Abs 11/29/2023 2,833  1,500 - 7,800 cells/uL Final    Lymph # 11/29/2023 1,925  850 - 3,900 cells/uL Final    Mono # 11/29/2023 556  200 - 950 cells/uL Final    Eos # 11/29/2023 154  15 - 500 cells/uL Final    Baso # 11/29/2023 33  0 - 200 cells/uL Final    Neutrophils Relative 11/29/2023 51.5  % Final    Lymph % 11/29/2023 35.0  % Final    Mono % 11/29/2023 10.1  % Final    Eosinophil % 11/29/2023 2.8  % Final    Basophil % 11/29/2023 0.6  % Final    Cholesterol 11/29/2023 190  <200 mg/dL Final    HDL 11/29/2023 52  > OR = 50 mg/dL Final    Triglycerides 11/29/2023 127  <150 mg/dL Final    LDL Cholesterol 11/29/2023 114 (H)  mg/dL (calc) Final    HDL/Cholesterol Ratio 11/29/2023 3.7  <5.0 (calc) Final    Non HDL Chol. (LDL+VLDL) 11/29/2023 138 (H)  <130 mg/dL (calc) Final   Office Visit on 08/29/2023   Component Date Value Ref Range Status    Hemoglobin A1C, POC 08/29/2023 6.2  % Final       Past Medical History:   Diagnosis Date    Abnormal heart rhythm     Allergy     Aneurysm of abdominal vessel 2020    Arthritis     COPD (chronic obstructive pulmonary disease)     Coronary artery disease     Depression     Diabetes mellitus, type 2     Essential hypertension 08/16/2016    Pure hypercholesterolemia 8/25/2023    Right sided sciatica 04/23/2018     Social History     Socioeconomic History    Marital status:    Tobacco Use    Smoking status: Never    Smokeless tobacco: Never   Substance and Sexual Activity    Alcohol use: No    Drug  use: No    Sexual activity: Never     Past Surgical History:   Procedure Laterality Date    CHOLECYSTECTOMY      FRACTURE SURGERY      HYSTERECTOMY      INJECTION OF ANESTHETIC AGENT AROUND MEDIAL BRANCH NERVES INNERVATING LUMBAR FACET JOINT Bilateral 7/5/2018    Procedure: Block-nerve-medial branch-lumbar;  Surgeon: Arthur Orellana MD;  Location: [facility] OR;  Service: Pain Management;  Laterality: Bilateral;  L2, 3, 4, 5    TONSILLECTOMY       Family History   Family history unknown: Yes       Review of patient's allergies indicates:   Allergen Reactions    Phenytoin Hives    Dilantin [phenytoin sodium extended] Rash    Lovenox [enoxaparin] Rash and Hives       Current Outpatient Medications:     aspirin (ECOTRIN) 81 MG EC tablet, Take 81 mg by mouth once daily., Disp: , Rfl:     blood-glucose meter kit, Use as instructed, Disp: 1 each, Rfl: 0    lancets 32 gauge Misc, Test glucose twice daily, Disp: 200 each, Rfl: 3    atorvastatin (LIPITOR) 20 MG tablet, Take 1 tablet (20 mg total) by mouth once daily., Disp: 90 tablet, Rfl: 3    blood sugar diagnostic Strp, To check BG 1 times daily, to use with insurance preferred meter, Disp: 200 each, Rfl: 5    gabapentin (NEURONTIN) 100 MG capsule, Take 1 capsule (100 mg total) by mouth 3 (three) times daily as needed (neuropathy and sciatic pain)., Disp: 90 capsule, Rfl: 11    L. acidophilus/L. rhamnosus (DIGESTIVE HEALTH PROBIOTIC ORAL), Take 1 tablet by mouth once daily., Disp: , Rfl:     linaCLOtide (LINZESS) 290 mcg Cap capsule, Take 1 capsule (290 mcg total) by mouth before breakfast., Disp: 30 capsule, Rfl: 11    lisinopriL (PRINIVIL,ZESTRIL) 2.5 MG tablet, Take 1 tablet (2.5 mg total) by mouth once daily., Disp: 90 tablet, Rfl: 3    meloxicam (MOBIC) 15 MG tablet, Take 1 tablet (15 mg total) by mouth once daily. (Patient not taking: Reported on 11/29/2023), Disp: 30 tablet, Rfl: 2    solifenacin (VESICARE) 10 MG tablet, Take 1 tablet (10 mg total) by mouth once daily. For  "bladder (Patient not taking: Reported on 11/29/2023), Disp: 90 tablet, Rfl: 1    tirzepatide (MOUNJARO) 2.5 mg/0.5 mL PnIj, Inject 2.5 mg into the skin every 7 days., Disp: 4 pen , Rfl: 11    Review of Systems   Constitutional:  Negative for activity change, appetite change, chills, fatigue, fever and unexpected weight change.   HENT:  Positive for postnasal drip. Negative for nasal congestion, ear pain, rhinorrhea, sore throat and trouble swallowing.    Eyes:  Negative for discharge and visual disturbance.   Respiratory:  Negative for apnea, cough, shortness of breath and wheezing.    Cardiovascular:  Negative for chest pain, palpitations and leg swelling.   Gastrointestinal:  Positive for constipation. Negative for abdominal pain, blood in stool, diarrhea, nausea, vomiting and reflux.   Genitourinary:  Positive for bladder incontinence. Negative for dysuria, frequency and urgency.   Musculoskeletal:  Positive for arthralgias and back pain. Negative for gait problem, leg pain and myalgias.   Integumentary:  Negative for rash and mole/lesion.   Neurological:  Negative for dizziness, tremors, weakness, light-headedness, numbness and headaches.   Hematological:  Does not bruise/bleed easily.   Psychiatric/Behavioral:  Negative for dysphoric mood, sleep disturbance and suicidal ideas. The patient is not nervous/anxious.            Objective:      Vitals:    11/29/23 0908   BP: 128/80   Pulse: 68   SpO2: 98%   Weight: (!) 146.1 kg (322 lb)   Height: 5' 9" (1.753 m)     Physical Exam  Vitals and nursing note reviewed.   Constitutional:       General: She is not in acute distress.     Appearance: Normal appearance. She is well-developed and well-groomed. She is obese.   HENT:      Head: Normocephalic and atraumatic.      Right Ear: External ear normal. Tympanic membrane is erythematous.      Left Ear: External ear normal. Tympanic membrane is erythematous.      Nose: Nose normal. No congestion or rhinorrhea.      " Mouth/Throat:      Mouth: Mucous membranes are moist.      Pharynx: Oropharynx is clear. No posterior oropharyngeal erythema.   Eyes:      General: No scleral icterus.        Right eye: No discharge.         Left eye: No discharge.      Pupils: Pupils are equal, round, and reactive to light.   Neck:      Thyroid: No thyromegaly.      Vascular: No carotid bruit.   Cardiovascular:      Rate and Rhythm: Normal rate and regular rhythm.      Pulses: Normal pulses.           Dorsalis pedis pulses are 2+ on the right side and 2+ on the left side.      Heart sounds: Normal heart sounds. No murmur heard.  Pulmonary:      Effort: Pulmonary effort is normal.      Breath sounds: Normal breath sounds. No wheezing or rales.   Abdominal:      General: Bowel sounds are normal. There is no distension.      Palpations: Abdomen is soft.      Tenderness: There is no abdominal tenderness.   Musculoskeletal:         General: Normal range of motion.      Cervical back: Normal range of motion and neck supple.      Lumbar back: Normal. No spasms.      Right lower leg: No edema.      Left lower leg: No edema.   Feet:      Right foot:      Protective Sensation: 4 sites tested.  3 sites sensed.      Skin integrity: Dry skin present.      Toenail Condition: Right toenails are ingrown.      Left foot:      Protective Sensation: 4 sites tested.  4 sites sensed.      Skin integrity: Dry skin present.      Toenail Condition: Left toenails are normal.   Skin:     General: Skin is warm and dry.      Capillary Refill: Capillary refill takes less than 2 seconds.      Coloration: Skin is not jaundiced or pale.      Findings: No lesion or rash.   Neurological:      General: No focal deficit present.      Mental Status: She is alert and oriented to person, place, and time. Mental status is at baseline.      Cranial Nerves: No cranial nerve deficit.      Motor: No weakness.      Coordination: Coordination normal.      Gait: Gait normal.   Psychiatric:          Mood and Affect: Mood normal.         Behavior: Behavior normal. Behavior is cooperative.         Thought Content: Thought content normal.         Judgment: Judgment normal.           Assessment:       1. Type 2 diabetes mellitus without complication, without long-term current use of insulin    2. History of colon polyps    3. Diverticula of colon    4. Right hip pain    5. Need for RSV immunization    6. Lumbar degenerative disc disease    7. Right sided sciatica    8. Chronic constipation    9. Essential hypertension    10. Diabetic polyneuropathy associated with type 2 diabetes mellitus    11. Pure hypercholesterolemia    12. Ingrown toenail    13. Bilateral otitis media, unspecified otitis media type    14. Need for influenza vaccination    15. Aneurysm of descending thoracic aorta without rupture    16. Vitamin D deficiency    17. Morbid obesity    18. Inflammatory spondylopathy of lumbar region         Plan:       Type 2 diabetes mellitus without complication, without long-term current use of insulin  Morbid obesity  A1c today 5.9%, continue Mounjaro. Labs for surveillance. Podiatry for ingrown nail. Advised patient not to try to cut It herself due to risk of infection and slow healing from DM  -     POCT HEMOGLOBIN A1C  -     tirzepatide (MOUNJARO) 2.5 mg/0.5 mL PnIj; Inject 2.5 mg into the skin every 7 days.  Dispense: 4 pen ; Refill: 11  -     blood sugar diagnostic Strp; To check BG 1 times daily, to use with insurance preferred meter  Dispense: 200 each; Refill: 5  -     CBC Auto Differential; Future; Expected date: 11/29/2023  -     Ambulatory referral/consult to Podiatry; Future; Expected date: 12/06/2023  -     Foot Exam Performed    Right hip pain  Lumbar degenerative disc disease  Right sided sciatica  nflammatory spondylopathy of lumbar region  Try gabapentin for both diabetic neuropathy and radiculopathy   -     Ambulatory referral/consult to Physical Medicine Rehab; Future; Expected date:  12/06/2023  -     gabapentin (NEURONTIN) 100 MG capsule; Take 1 capsule (100 mg total) by mouth 3 (three) times daily as needed (neuropathy and sciatic pain).  Dispense: 90 capsule; Refill: 11    Need for RSV immunization  Discussed and agreed upon vaccine  -     RSVPreF3 antigen-AS01E, PF, (AREXVY, PF,) 120 mcg/0.5 mL SusR vaccine; Inject 0.5 mLs into the muscle once. for 1 dose  Dispense: 0.5 mL; Refill: 0    Chronic constipation  History of colon polyps  Diverticula of colon  Continue to manage with linzess  -     linaCLOtide (LINZESS) 290 mcg Cap capsule; Take 1 capsule (290 mcg total) by mouth before breakfast.  Dispense: 30 capsule; Refill: 11    Essential hypertension  Aneurysm of descending thoracic aorta without rupture  Blood pressure well controlled. Continue as is. Repeat abdominal ultrasound for evaluation of aortic aneurysm  -     lisinopriL (PRINIVIL,ZESTRIL) 2.5 MG tablet; Take 1 tablet (2.5 mg total) by mouth once daily.  Dispense: 90 tablet; Refill: 3  -     CBC Auto Differential; Future; Expected date: 11/29/2023    Diabetic polyneuropathy associated with type 2 diabetes mellitus  -     gabapentin (NEURONTIN) 100 MG capsule; Take 1 capsule (100 mg total) by mouth 3 (three) times daily as needed (neuropathy and sciatic pain).  Dispense: 90 capsule; Refill: 11    Pure hypercholesterolemia  Need for updated labs for eval  -     POCT HEMOGLOBIN A1C  -     CBC Auto Differential; Future; Expected date: 11/29/2023  -     Lipid Panel; Future; Expected date: 11/29/2023    Ingrown toenail  Advised patient not to try to cut it out hersefl as she can cause an infection and non healing wound  -     Ambulatory referral/consult to Podiatry; Future; Expected date: 12/06/2023    Bilateral otitis media, unspecified otitis media type  Likely an after effect of sinus/uri patient has just recovered from  -     neomycin-polymyxin-hydrocortisone (CORTISPORIN) 3.5-10,000-1 mg/mL-unit/mL-% otic suspension; Place 3 drops  into both ears 3 (three) times daily. for 7 days  Dispense: 10 mL; Refill: 0    Need for influenza vaccination  -     Influenza - Quadrivalent *Preferred* (6 months+) (PF)      Follow up in about 3 months (around 2/29/2024) for Diabetic Check Up.        12/17/2023 Milly Llamas

## 2023-12-22 ENCOUNTER — HOSPITAL ENCOUNTER (OUTPATIENT)
Dept: RADIOLOGY | Facility: HOSPITAL | Age: 63
Discharge: HOME | End: 2023-12-22
Payer: MEDICARE

## 2023-12-22 ENCOUNTER — TELEPHONE (OUTPATIENT)
Dept: FAMILY MEDICINE | Facility: CLINIC | Age: 63
End: 2023-12-22
Payer: MEDICARE

## 2023-12-22 DIAGNOSIS — E11.9 TYPE 2 DIABETES MELLITUS WITHOUT COMPLICATION, WITHOUT LONG-TERM CURRENT USE OF INSULIN: ICD-10-CM

## 2023-12-22 DIAGNOSIS — E78.00 PURE HYPERCHOLESTEROLEMIA: ICD-10-CM

## 2023-12-22 DIAGNOSIS — I10 ESSENTIAL HYPERTENSION: ICD-10-CM

## 2023-12-22 DIAGNOSIS — I71.23 ANEURYSM OF DESCENDING THORACIC AORTA WITHOUT RUPTURE: ICD-10-CM

## 2023-12-22 PROCEDURE — 76775 US EXAM ABDO BACK WALL LIM: CPT | Mod: TC,PO

## 2023-12-22 NOTE — TELEPHONE ENCOUNTER
----- Message from ANGEL Mares-PONCHO sent at 12/22/2023 12:19 PM CST -----  Please let Mrs. Miguel know her ultrasound was normal, no aneurysm.

## 2024-01-29 ENCOUNTER — TELEPHONE (OUTPATIENT)
Dept: FAMILY MEDICINE | Facility: CLINIC | Age: 64
End: 2024-01-29
Payer: MEDICARE

## 2024-01-29 ENCOUNTER — CLINICAL SUPPORT (OUTPATIENT)
Dept: FAMILY MEDICINE | Facility: CLINIC | Age: 64
End: 2024-01-29
Payer: MEDICARE

## 2024-01-29 DIAGNOSIS — E11.9 TYPE 2 DIABETES MELLITUS WITHOUT COMPLICATION, WITHOUT LONG-TERM CURRENT USE OF INSULIN: ICD-10-CM

## 2024-01-29 DIAGNOSIS — N30.01 ACUTE CYSTITIS WITH HEMATURIA: ICD-10-CM

## 2024-01-29 DIAGNOSIS — R39.9 UTI SYMPTOMS: Primary | ICD-10-CM

## 2024-01-29 LAB
BILIRUB UR QL STRIP: NEGATIVE
GLUCOSE UR QL STRIP: NEGATIVE
KETONES UR QL STRIP: NEGATIVE
LEUKOCYTE ESTERASE UR QL STRIP: POSITIVE
PH, POC UA: 6
POC BLOOD, URINE: POSITIVE
POC NITRATES, URINE: NEGATIVE
PROT UR QL STRIP: POSITIVE
SP GR UR STRIP: 0.03 (ref 1–1.03)
UROBILINOGEN UR STRIP-ACNC: 0.2 (ref 0.1–1.1)

## 2024-01-29 PROCEDURE — 81003 URINALYSIS AUTO W/O SCOPE: CPT | Mod: QW,S$GLB,, | Performed by: FAMILY MEDICINE

## 2024-01-29 PROCEDURE — 99499 UNLISTED E&M SERVICE: CPT | Mod: S$GLB,,, | Performed by: FAMILY MEDICINE

## 2024-01-29 RX ORDER — NITROFURANTOIN 25; 75 MG/1; MG/1
100 CAPSULE ORAL 2 TIMES DAILY
Qty: 20 CAPSULE | Refills: 0 | Status: SHIPPED | OUTPATIENT
Start: 2024-01-29 | End: 2024-02-08

## 2024-01-29 RX ORDER — TIRZEPATIDE 5 MG/.5ML
5 INJECTION, SOLUTION SUBCUTANEOUS
Qty: 4 PEN | Refills: 11 | Status: SHIPPED | OUTPATIENT
Start: 2024-01-29 | End: 2024-04-01

## 2024-01-29 NOTE — TELEPHONE ENCOUNTER
----- Message from Estela Colón sent at 1/29/2024  9:58 AM CST -----  Regarding: HERE WANTS A UA DONE  PT IS HERE WITH POSS UTI  WANTS A UA AND MEDS   IN WAITING ROOM NOW  KBB

## 2024-01-29 NOTE — PATIENT INSTRUCTIONS
Please tell Mrs. Miguel to  drink a lot of water, add lemon, can take AZO over the counter for discomfort. May take a probiotic to avoid stomach upset from antibiotics.

## 2024-01-29 NOTE — PROGRESS NOTES
Pt a walk in- States she started with frequent  urination and burning over the weekend. Thinks she has UTI. Verified allergies and pharmacy. Will do urine dip and culture today.

## 2024-01-31 ENCOUNTER — TELEPHONE (OUTPATIENT)
Dept: FAMILY MEDICINE | Facility: CLINIC | Age: 64
End: 2024-01-31
Payer: MEDICARE

## 2024-01-31 LAB — BACTERIA UR CULT: ABNORMAL

## 2024-01-31 NOTE — TELEPHONE ENCOUNTER
Spoke with patient regarding lab results verbatim per Monet LAGUNAS patient states she is feeling much better. Patient is currently taking Macrobid. Informed to call back if needed. Patient verbalized understanding.

## 2024-01-31 NOTE — TELEPHONE ENCOUNTER
----- Message from ANGEL Mares-CNP sent at 1/31/2024  8:15 AM CST -----  Let Mrs. Miguel know her urine culture shows E. Coli bacteria. The antibiotic I put her on should be sufficient, but I will know for sure tomorrow when I get the final report.

## 2024-01-31 NOTE — PROGRESS NOTES
Let Mrs. Miguel know her urine culture shows E. Coli bacteria. The antibiotic I put her on should be sufficient, but I will know for sure tomorrow when I get the final report.

## 2024-02-29 ENCOUNTER — OFFICE VISIT (OUTPATIENT)
Dept: FAMILY MEDICINE | Facility: CLINIC | Age: 64
End: 2024-02-29
Attending: FAMILY MEDICINE
Payer: MEDICARE

## 2024-02-29 VITALS
WEIGHT: 293 LBS | BODY MASS INDEX: 43.4 KG/M2 | HEART RATE: 64 BPM | SYSTOLIC BLOOD PRESSURE: 110 MMHG | DIASTOLIC BLOOD PRESSURE: 66 MMHG | HEIGHT: 69 IN

## 2024-02-29 DIAGNOSIS — E66.01 MORBID OBESITY: ICD-10-CM

## 2024-02-29 DIAGNOSIS — M51.36 LUMBAR DEGENERATIVE DISC DISEASE: ICD-10-CM

## 2024-02-29 DIAGNOSIS — E11.42 DIABETIC POLYNEUROPATHY ASSOCIATED WITH TYPE 2 DIABETES MELLITUS: ICD-10-CM

## 2024-02-29 DIAGNOSIS — E11.9 TYPE 2 DIABETES MELLITUS WITHOUT COMPLICATION, WITHOUT LONG-TERM CURRENT USE OF INSULIN: Primary | ICD-10-CM

## 2024-02-29 DIAGNOSIS — I89.0 LYMPHEDEMA OF LEFT LOWER EXTREMITY: ICD-10-CM

## 2024-02-29 DIAGNOSIS — M46.96 INFLAMMATORY SPONDYLOPATHY OF LUMBAR REGION: ICD-10-CM

## 2024-02-29 DIAGNOSIS — I10 ESSENTIAL HYPERTENSION: ICD-10-CM

## 2024-02-29 DIAGNOSIS — M15.9 PRIMARY OSTEOARTHRITIS INVOLVING MULTIPLE JOINTS: ICD-10-CM

## 2024-02-29 DIAGNOSIS — I71.23 ANEURYSM OF DESCENDING THORACIC AORTA WITHOUT RUPTURE: ICD-10-CM

## 2024-02-29 PROBLEM — H66.93 BILATERAL OTITIS MEDIA: Status: RESOLVED | Noted: 2023-12-17 | Resolved: 2024-02-29

## 2024-02-29 PROBLEM — L60.0 INGROWN TOENAIL: Status: RESOLVED | Noted: 2023-12-17 | Resolved: 2024-02-29

## 2024-02-29 LAB — HBA1C MFR BLD: 6.3 %

## 2024-02-29 PROCEDURE — 83036 HEMOGLOBIN GLYCOSYLATED A1C: CPT | Mod: QW,,, | Performed by: FAMILY MEDICINE

## 2024-02-29 PROCEDURE — 99214 OFFICE O/P EST MOD 30 MIN: CPT | Mod: S$GLB,,, | Performed by: FAMILY MEDICINE

## 2024-02-29 RX ORDER — CARVEDILOL 3.12 MG/1
3.12 TABLET ORAL 2 TIMES DAILY WITH MEALS
COMMUNITY

## 2024-02-29 NOTE — PROGRESS NOTES
SUBJECTIVE:    Patient ID: Martha Miguel is a 63 y.o. female.    Chief Complaint: DM Check Up / A1C, discuss restarting ozempic, concerns of pain in L groin, and restarted self on carvedilol 3.125 bid      Patient hypertension and type 2 diabetes presents for visit.  Had some occasional dizziness and was told she can hold her Coreg if needed.  She has been taking it routinely to keep her pressure is under control.  She continues to follow annually with Cardiothoracic surgery for an aortic aneurism.  Recent report demonstrates that it is stable at 0.2 cm.  She has no chest pain.  Her blood pressure has been well-controlled.    Diabetes shows good control with an A1c of 6.3.using mounjaro. Dosage recently increased to 5 patient tolerating   Lipids had elevated ldl and atorvastatin increased  Continues on medication for constipation and OAB. Trying to wean off vesicare     History of colon polyps.  Last colonoscopy April 2021 demonstrated 6 polyps all at least 3 mm in size.  She is due for repeat this year.  She is already received a letter from Dr. Person's office.    Had eye exam March 2023 that showed mild senile cataract but no diabetic retinopathy.  She is due to see them again on next month.          Office Visit on 02/29/2024   Component Date Value Ref Range Status    Hemoglobin A1C, POC 02/29/2024 6.3  % Final   Clinical Support on 01/29/2024   Component Date Value Ref Range Status    POC Blood, Urine 01/29/2024 Positive (A)  Negative Final    POC Bilirubin, Urine 01/29/2024 Negative  Negative Final    POC Urobilinogen, Urine 01/29/2024 0.2  0.1 - 1.1 Final    POC Ketones, Urine 01/29/2024 Negative  Negative Final    POC Protein, Urine 01/29/2024 Positive (A)  Negative Final    POC Nitrates, Urine 01/29/2024 Negative  Negative Final    POC Glucose, Urine 01/29/2024 Negative  Negative Final    pH, UA 01/29/2024 6.0   Final    POC Specific Gravity, Urine 01/29/2024 0.030 (A)  1.003 - 1.029 Final    POC  Leukocytes, Urine 01/29/2024 Positive (A)  Negative Final    Urine Culture, Routine 01/29/2024  (A)   Final   Office Visit on 11/29/2023   Component Date Value Ref Range Status    Hemoglobin A1C, POC 11/29/2023 5.9  % Final    WBC 11/29/2023 5.5  3.8 - 10.8 Thousand/uL Final    RBC 11/29/2023 4.46  3.80 - 5.10 Million/uL Final    Hemoglobin 11/29/2023 13.6  11.7 - 15.5 g/dL Final    Hematocrit 11/29/2023 41.4  35.0 - 45.0 % Final    MCV 11/29/2023 92.8  80.0 - 100.0 fL Final    MCH 11/29/2023 30.5  27.0 - 33.0 pg Final    MCHC 11/29/2023 32.9  32.0 - 36.0 g/dL Final    RDW 11/29/2023 12.6  11.0 - 15.0 % Final    Platelets 11/29/2023 319  140 - 400 Thousand/uL Final    MPV 11/29/2023 10.9  7.5 - 12.5 fL Final    Neutrophils, Abs 11/29/2023 2,833  1,500 - 7,800 cells/uL Final    Lymph # 11/29/2023 1,925  850 - 3,900 cells/uL Final    Mono # 11/29/2023 556  200 - 950 cells/uL Final    Eos # 11/29/2023 154  15 - 500 cells/uL Final    Baso # 11/29/2023 33  0 - 200 cells/uL Final    Neutrophils Relative 11/29/2023 51.5  % Final    Lymph % 11/29/2023 35.0  % Final    Mono % 11/29/2023 10.1  % Final    Eosinophil % 11/29/2023 2.8  % Final    Basophil % 11/29/2023 0.6  % Final    Cholesterol 11/29/2023 190  <200 mg/dL Final    HDL 11/29/2023 52  > OR = 50 mg/dL Final    Triglycerides 11/29/2023 127  <150 mg/dL Final    LDL Cholesterol 11/29/2023 114 (H)  mg/dL (calc) Final    HDL/Cholesterol Ratio 11/29/2023 3.7  <5.0 (calc) Final    Non HDL Chol. (LDL+VLDL) 11/29/2023 138 (H)  <130 mg/dL (calc) Final        Past Medical History:   Diagnosis Date    Abnormal heart rhythm     Allergy     Aneurysm of abdominal vessel 2020    Arthritis     COPD (chronic obstructive pulmonary disease)     Coronary artery disease     Depression     Diabetes mellitus, type 2     Essential hypertension 08/16/2016    Pure hypercholesterolemia 8/25/2023    Right sided sciatica 04/23/2018     Past Surgical History:   Procedure Laterality Date     CHOLECYSTECTOMY      FRACTURE SURGERY      HYSTERECTOMY      INJECTION OF ANESTHETIC AGENT AROUND MEDIAL BRANCH NERVES INNERVATING LUMBAR FACET JOINT Bilateral 7/5/2018    Procedure: Block-nerve-medial branch-lumbar;  Surgeon: Arthur Orellana MD;  Location: [facility] OR;  Service: Pain Management;  Laterality: Bilateral;  L2, 3, 4, 5    TONSILLECTOMY       Family History   Family history unknown: Yes       Marital Status:   Alcohol History:  reports no history of alcohol use.  Tobacco History:  reports that she has never smoked. She has never used smokeless tobacco.  Drug History:  reports no history of drug use.    Review of patient's allergies indicates:   Allergen Reactions    Phenytoin Hives    Dilantin [phenytoin sodium extended] Rash    Lovenox [enoxaparin] Rash and Hives       Current Outpatient Medications:     atorvastatin (LIPITOR) 20 MG tablet, Take 1 tablet (20 mg total) by mouth once daily., Disp: 90 tablet, Rfl: 3    carvediloL (COREG) 3.125 MG tablet, Take 3.125 mg by mouth 2 (two) times daily with meals., Disp: , Rfl:     linaCLOtide (LINZESS) 290 mcg Cap capsule, Take 1 capsule (290 mcg total) by mouth before breakfast., Disp: 30 capsule, Rfl: 11    solifenacin (VESICARE) 10 MG tablet, Take 1 tablet (10 mg total) by mouth once daily. For bladder, Disp: 90 tablet, Rfl: 1    aspirin (ECOTRIN) 81 MG EC tablet, Take 81 mg by mouth once daily., Disp: , Rfl:     blood sugar diagnostic Strp, To check BG 1 times daily, to use with insurance preferred meter, Disp: 200 each, Rfl: 5    blood-glucose meter kit, Use as instructed, Disp: 1 each, Rfl: 0    gabapentin (NEURONTIN) 100 MG capsule, Take 1 capsule (100 mg total) by mouth 3 (three) times daily as needed (neuropathy and sciatic pain)., Disp: 90 capsule, Rfl: 11    L. acidophilus/L. rhamnosus (DIGESTIVE HEALTH PROBIOTIC ORAL), Take 1 tablet by mouth once daily., Disp: , Rfl:     lancets 32 gauge Misc, Test glucose twice daily, Disp: 200 each, Rfl: 3     "lisinopriL (PRINIVIL,ZESTRIL) 2.5 MG tablet, Take 1 tablet (2.5 mg total) by mouth once daily., Disp: 90 tablet, Rfl: 3    meloxicam (MOBIC) 15 MG tablet, Take 1 tablet (15 mg total) by mouth once daily. (Patient not taking: Reported on 11/29/2023), Disp: 30 tablet, Rfl: 2    tirzepatide (MOUNJARO) 5 mg/0.5 mL PnIj, Inject 5 mg into the skin every 7 days., Disp: 4 pen , Rfl: 11    Review of Systems   Constitutional:  Positive for unexpected weight change. Negative for activity change.   HENT:  Negative for hearing loss, rhinorrhea and trouble swallowing.    Eyes:  Negative for discharge and visual disturbance.   Respiratory:  Negative for chest tightness and wheezing.    Cardiovascular:  Negative for chest pain and palpitations.   Gastrointestinal:  Positive for constipation. Negative for blood in stool, diarrhea and vomiting.   Endocrine: Negative for polydipsia and polyuria.   Genitourinary:  Negative for difficulty urinating, dysuria, hematuria and menstrual problem.   Musculoskeletal:  Positive for arthralgias and joint swelling. Negative for neck pain.   Neurological:  Negative for weakness and headaches.   Psychiatric/Behavioral:  Negative for confusion and dysphoric mood.           Objective:      Vitals:    02/29/24 0851   BP: 110/66   Pulse: 64   Weight: (!) 148.8 kg (328 lb)   Height: 5' 9" (1.753 m)     Physical Exam  Constitutional:       Appearance: Normal appearance. She is obese.   HENT:      Head: Normocephalic and atraumatic.      Mouth/Throat:      Mouth: Mucous membranes are moist.   Eyes:      Conjunctiva/sclera: Conjunctivae normal.   Cardiovascular:      Pulses:           Dorsalis pedis pulses are 2+ on the right side and 2+ on the left side.   Pulmonary:      Effort: Pulmonary effort is normal.   Musculoskeletal:      Right lower leg: Edema present.      Left lower leg: Edema present.      Right foot: Normal range of motion.      Left foot: Normal range of motion.   Feet:      Right foot:      " Protective Sensation: 6 sites tested.  6 sites sensed.      Skin integrity: Skin integrity normal.      Toenail Condition: Right toenails are normal.      Left foot:      Protective Sensation: 6 sites tested.  6 sites sensed.      Skin integrity: Skin integrity normal.      Toenail Condition: Left toenails are normal.   Neurological:      General: No focal deficit present.      Mental Status: She is alert and oriented to person, place, and time.   Psychiatric:         Mood and Affect: Mood normal.         Behavior: Behavior normal.           Assessment:       1. Type 2 diabetes mellitus without complication, without long-term current use of insulin    2. Diabetic polyneuropathy associated with type 2 diabetes mellitus    3. Essential hypertension    4. Primary osteoarthritis involving multiple joints    5. Lumbar degenerative disc disease    6. Inflammatory spondylopathy of lumbar region    7. Aneurysm of descending thoracic aorta without rupture    8. Morbid obesity    9. Lymphedema of left lower extremity         Plan:       Type 2 diabetes mellitus without complication, without long-term current use of insulin  -     Hemoglobin A1C, POCT  -     Foot Exam Performed  Controlled continue current    Diabetic polyneuropathy associated with type 2 diabetes mellitus  Stable    Essential hypertension  Controlled    Primary osteoarthritis involving multiple joints    Lumbar degenerative disc disease    Inflammatory spondylopathy of lumbar region    Aneurysm of descending thoracic aorta without rupture  Stable    Morbid obesity  Continue on therapy    Lymphedema of left lower extremity  Improves with elevation      Follow up in about 6 months (around 8/29/2024) for HTN.

## 2024-04-01 ENCOUNTER — TELEPHONE (OUTPATIENT)
Dept: FAMILY MEDICINE | Facility: CLINIC | Age: 64
End: 2024-04-01
Payer: MEDICARE

## 2024-04-01 DIAGNOSIS — E11.9 TYPE 2 DIABETES MELLITUS WITHOUT COMPLICATION, WITHOUT LONG-TERM CURRENT USE OF INSULIN: Primary | ICD-10-CM

## 2024-04-01 RX ORDER — METFORMIN HYDROCHLORIDE 1000 MG/1
1000 TABLET ORAL 2 TIMES DAILY WITH MEALS
Qty: 60 TABLET | Refills: 0 | Status: SHIPPED | OUTPATIENT
Start: 2024-04-01 | End: 2024-05-01

## 2024-04-01 NOTE — TELEPHONE ENCOUNTER
Pt states sugar has been is the 140s. Sugar is usually around 110. States she is having a colonoscopy done Friday so she has to hold her rx of mounjaro    Pt states she just wanted to know if there was anything she could take temporarily.

## 2024-04-01 NOTE — TELEPHONE ENCOUNTER
----- Message from Pari Anderson sent at 4/1/2024 12:50 PM CDT -----  Pt state her sugar is going up and is going to need something to take before she receives her shot. Sugars are high.   Mei moore  [phone]

## 2024-04-02 NOTE — TELEPHONE ENCOUNTER
prescription sent to   [facility] - [place] [address] [facility]  [address] [facility]  [place] [address]  Phone: [phone] Fax: [phone]

## 2024-04-05 LAB — CRC RECOMMENDATION EXT: NORMAL

## 2024-04-09 ENCOUNTER — TELEPHONE (OUTPATIENT)
Dept: FAMILY MEDICINE | Facility: CLINIC | Age: 64
End: 2024-04-09
Payer: MEDICARE

## 2024-04-09 DIAGNOSIS — G47.30 SLEEP APNEA, UNSPECIFIED TYPE: Primary | ICD-10-CM

## 2024-04-09 NOTE — TELEPHONE ENCOUNTER
Received fax from [facility] that they need updated sleep study info.   Pt states it has been many years since her last sleep study. Over 5.   Pt sees provider upstairs from us.     Referral pended.

## 2024-04-11 ENCOUNTER — PATIENT OUTREACH (OUTPATIENT)
Dept: ADMINISTRATIVE | Facility: HOSPITAL | Age: 64
End: 2024-04-11
Payer: MEDICARE

## 2024-04-11 ENCOUNTER — TELEPHONE (OUTPATIENT)
Dept: FAMILY MEDICINE | Facility: CLINIC | Age: 64
End: 2024-04-11
Payer: MEDICARE

## 2024-04-11 NOTE — TELEPHONE ENCOUNTER
----- Message from Kourtney Foster sent at 4/11/2024 12:38 PM CDT -----  Contact: [facility]  CPAP therapy notes have bot been received. Ref #[id number]. Fax #[phone]. [facility] @[phone]

## 2024-04-11 NOTE — PROGRESS NOTES
Population Health Chart Review & Patient Outreach Details      Additional [facility] Health Notes:               Updates Requested / Reviewed:      Updated Care Coordination Note         Health Maintenance Topics Overdue:      VBHM Score: 2     Urine Screening  Eye Exam    RSV Vaccine                  Health Maintenance Topic(s) Outreach Outcomes & Actions Taken:    Colorectal Cancer Screening - Outreach Outcomes & Actions Taken  : External Records Uploaded, Care Team Updated, & History Updated if Applicable

## 2024-04-15 ENCOUNTER — TELEPHONE (OUTPATIENT)
Dept: FAMILY MEDICINE | Facility: CLINIC | Age: 64
End: 2024-04-15
Payer: MEDICARE

## 2024-04-15 NOTE — TELEPHONE ENCOUNTER
----- Message from Pari Anderson sent at 4/15/2024 10:47 AM CDT -----  Contact: arrianna Arrianna [facility] calling about CPAP evaluation for pt  Fax#[phone]

## 2024-04-17 ENCOUNTER — TELEPHONE (OUTPATIENT)
Dept: FAMILY MEDICINE | Facility: CLINIC | Age: 64
End: 2024-04-17
Payer: MEDICARE

## 2024-04-17 NOTE — TELEPHONE ENCOUNTER
----- Message from Milly Ramos sent at 4/17/2024  1:50 PM CDT -----  Vm- 1:49- ruben with [facility] is calling to f/u on the doctor eval request they faxed on 4/8.   [phone] phone   Fax [phone]

## 2024-04-24 DIAGNOSIS — E11.9 TYPE 2 DIABETES MELLITUS WITHOUT COMPLICATION, WITHOUT LONG-TERM CURRENT USE OF INSULIN: Primary | ICD-10-CM

## 2024-04-24 NOTE — TELEPHONE ENCOUNTER
Spoke with pt. Advised we will work on her referral.   Pt also reports [facility] has mounjaro on back order. She will call to see what medication they have in stock.     Spoke with sleep center. They will call pt for referral.

## 2024-04-24 NOTE — TELEPHONE ENCOUNTER
----- Message from Clare Echeverria sent at 4/24/2024  3:23 PM CDT -----  Pt came in and said she went upstairs to the sleep study and was told she had to have a prescription from her pcp to go to the hospital to have this done. Also, she said no one has mounjaro. She's out and wants to know if you can call her in something else.  [phone]

## 2024-04-25 RX ORDER — SEMAGLUTIDE 2.68 MG/ML
2 INJECTION, SOLUTION SUBCUTANEOUS
Qty: 3 ML | Refills: 3 | Status: SHIPPED | OUTPATIENT
Start: 2024-04-25 | End: 2025-04-25

## 2024-04-25 NOTE — TELEPHONE ENCOUNTER
----- Message from Pari Anderson sent at 4/25/2024 10:36 AM CDT -----  Pt states has the ozempic and have all strengths at [facility] on [address]   [phone]

## 2024-05-13 DIAGNOSIS — R06.02 SHORTNESS OF BREATH: Primary | ICD-10-CM

## 2024-05-13 DIAGNOSIS — R06.2 WHEEZING: ICD-10-CM

## 2024-05-14 ENCOUNTER — HOSPITAL ENCOUNTER (OUTPATIENT)
Dept: RADIOLOGY | Facility: HOSPITAL | Age: 64
Discharge: HOME | End: 2024-05-14
Attending: INTERNAL MEDICINE
Payer: MEDICARE

## 2024-05-14 DIAGNOSIS — R06.02 SHORTNESS OF BREATH: ICD-10-CM

## 2024-05-14 DIAGNOSIS — R06.2 WHEEZING: ICD-10-CM

## 2024-05-14 PROCEDURE — 71046 X-RAY EXAM CHEST 2 VIEWS: CPT | Mod: TC,PO

## 2024-05-14 PROCEDURE — 71046 X-RAY EXAM CHEST 2 VIEWS: CPT | Mod: 26,,, | Performed by: RADIOLOGY

## 2024-05-29 ENCOUNTER — HOSPITAL ENCOUNTER (EMERGENCY)
Facility: HOSPITAL | Age: 64
Discharge: HOME | End: 2024-05-29
Attending: EMERGENCY MEDICINE
Payer: MEDICARE

## 2024-05-29 VITALS
TEMPERATURE: 98 F | SYSTOLIC BLOOD PRESSURE: 129 MMHG | HEART RATE: 63 BPM | OXYGEN SATURATION: 96 % | DIASTOLIC BLOOD PRESSURE: 79 MMHG | BODY MASS INDEX: 48.73 KG/M2 | WEIGHT: 293 LBS | RESPIRATION RATE: 16 BRPM

## 2024-05-29 DIAGNOSIS — M62.838 NECK MUSCLE SPASM: ICD-10-CM

## 2024-05-29 DIAGNOSIS — S16.1XXA CERVICAL STRAIN, ACUTE, INITIAL ENCOUNTER: Primary | ICD-10-CM

## 2024-05-29 PROCEDURE — 99284 EMERGENCY DEPT VISIT MOD MDM: CPT | Mod: 25

## 2024-05-29 PROCEDURE — 25000003 PHARM REV CODE 250: Performed by: EMERGENCY MEDICINE

## 2024-05-29 RX ORDER — METHOCARBAMOL 750 MG/1
750 TABLET, FILM COATED ORAL 3 TIMES DAILY
Qty: 15 TABLET | Refills: 0 | Status: SHIPPED | OUTPATIENT
Start: 2024-05-29 | End: 2024-06-03

## 2024-05-29 RX ORDER — METHOCARBAMOL 500 MG/1
500 TABLET, FILM COATED ORAL
Status: COMPLETED | OUTPATIENT
Start: 2024-05-29 | End: 2024-05-29

## 2024-05-29 RX ADMIN — METHOCARBAMOL TABLETS 500 MG: 500 TABLET, COATED ORAL at 03:05

## 2024-05-29 NOTE — ED PROVIDER NOTES
Encounter Date: 5/29/2024       History     Chief Complaint   Patient presents with    Neck Pain     Right side, states she fell last week but unsure if related because she did not have pain at that time     63-year-old female presents emergency department past medical history includes abdominal aneurysm, COPD, CAD, diabetes.  Patient reports that she had a mechanical trip and fall 5 days ago in which she landed on her right knee sustained a abrasion reports also fell with her hands outstretched.  Patient reports she sleeps nightly in a recliner and states that she has had pain to the right lateral aspect of the neck that was noted upon awakening the next day.  Patient reports any type of movement to the right, or flexion makes the pain to the right side of her neck worse.  The patient has taken Motrin with mild relief of symptoms.  Denies any further complaints, or weaknesses to her upper or lower extremities.  Patient denies striking her head    The history is provided by the patient.     Review of patient's allergies indicates:   Allergen Reactions    Phenytoin Hives    Dilantin [phenytoin sodium extended] Rash    Lovenox [enoxaparin] Rash and Hives     Past Medical History:   Diagnosis Date    Abnormal heart rhythm     Allergy     Aneurysm of abdominal vessel 2020    Arthritis     COPD (chronic obstructive pulmonary disease)     Coronary artery disease     Depression     Diabetes mellitus, type 2     Essential hypertension 08/16/2016    Pure hypercholesterolemia 8/25/2023    Right sided sciatica 04/23/2018     Past Surgical History:   Procedure Laterality Date    CHOLECYSTECTOMY      FRACTURE SURGERY      HYSTERECTOMY      INJECTION OF ANESTHETIC AGENT AROUND MEDIAL BRANCH NERVES INNERVATING LUMBAR FACET JOINT Bilateral 7/5/2018    Procedure: Block-nerve-medial branch-lumbar;  Surgeon: Arthur Orellana MD;  Location: [facility];  Service: Pain Management;  Laterality: Bilateral;  L2, 3, 4, 5    TONSILLECTOMY        Family History   Family history unknown: Yes     Social History     Tobacco Use    Smoking status: Never    Smokeless tobacco: Never   Substance Use Topics    Alcohol use: No    Drug use: No     Review of Systems   Constitutional: Negative.  Negative for fever.   HENT: Negative.     Respiratory: Negative.     Cardiovascular: Negative.    Gastrointestinal: Negative.    Musculoskeletal:  Positive for neck pain.   Neurological: Negative.    Hematological: Negative.    Psychiatric/Behavioral: Negative.     All other systems reviewed and are negative.      Physical Exam     Initial Vitals [05/29/24 1346]   BP Pulse Resp Temp SpO2   (!) 158/85 70 20 98.3 °F (36.8 °C) (!) 94 %      MAP       --         Physical Exam    Nursing note and vitals reviewed.  Constitutional: She appears well-developed and well-nourished.   HENT:   Head: Normocephalic and atraumatic.   Neck: Trachea normal.       Patient has tenderness to the right lateral aspect of her neck, pain is reproducible with palpation, lateral rotation, flexion.  Strength to upper and lower extremities are 5/5.  Distal pulses are intact.  Patient is mentating normally.  She has a witnessed today ambulatory gait not requiring any assistance   Cardiovascular:  Normal rate, regular rhythm, normal heart sounds and intact distal pulses.           Pulmonary/Chest: Breath sounds normal.   Musculoskeletal:      Cervical back: Muscular tenderness present. Decreased range of motion.     Neurological: She is alert and oriented to person, place, and time. She has normal strength. GCS score is 15. GCS eye subscore is 4. GCS verbal subscore is 5. GCS motor subscore is 6.   Skin: Skin is warm.         ED Course   Procedures  Labs Reviewed - No data to display       Imaging Results              CT Cervical Spine Without Contrast (Final result)  Result time 05/29/24 16:18:14      Final result by Valente Galvan MD (05/29/24 16:18:14)                   Impression:      No acute  fracture or traumatic malalignment in the cervical spine.      Electronically signed by: Valente Galvan  Date:    05/29/2024  Time:    16:18               Narrative:    CLINICAL HISTORY:  ([id number])64 y/o  (1960) F    Neck trauma, impaired ROM (Age 16-64y);Neck trauma, midline tenderness (Age 16-64y);    TECHNIQUE:  (A#[id number], exam time 5/29/2024 16:15)    CT CERVICAL SPINE WITHOUT CONTRAST [id number]    Contiguous thin section axial images were obtained of the cervical spine. Sagittal and coronal reformatted images were generated. Note that this exam is suboptimal for evaluation of disc disease (better evaluated on MRI) and does not assess for ligamentous injury or stability.    CMS MANDATED QUALITY DATA - CT RADIATION - 436    All CT scans at this facility utilize dose modulation, iterative reconstruction, and/or weight based dosing when appropriate to reduce radiation dose to as low as reasonably achievable.    COMPARISON:  None available.    FINDINGS:  No acute fracture of the cervical spine. Straightening of the cervical spine curvature  likely due to the cervical collar. No acute intraspinal abnormality. No perched, jumped or locked facets seen.    Vertebral body heights are maintained.  There is mild-to-moderate disc height loss throughout the cervical spine.  Mild dextroscoliosis is present.    Visualized brain is unremarkable. Visualized lung apices and prevertebral tissues are unremarkable.                                       Medications   methocarbamoL tablet 500 mg (500 mg Oral Given 5/29/24 1508)     Medical Decision Making  63-year-old female presents emergency department past medical history includes abdominal aneurysm, COPD, CAD, diabetes.  Patient reports that she had a mechanical trip and fall 5 days ago in which she landed on her right knee sustained a abrasion reports also fell with her hands outstretched.  Patient reports she sleeps nightly in a recliner and states that she has had pain  to the right lateral aspect of the neck that was noted upon awakening the next day.  Patient reports any type of movement to the right, or flexion makes the pain to the right side of her neck worse.  The patient has taken Motrin with mild relief of symptoms.  Denies any further complaints, or weaknesses to her upper or lower extremities.  Patient denies striking her head    The history is provided by the patient.     63-year-old female presents emergency department status post mechanical fall which she fell on her right knee, and both hands.  Patient complaining of pain to the right lateral aspect of the knee that started the next day upon awakening.  Patient's pain is reproducible with palpation to the right lateral aspect of the neck, along with lateral rotation, flexion-extension of the neck.  Patient has no neurological deficits on physical exam.  She has a witnessed today ambulatory gait without any assistance.  She is 5/5 strength to upper and lower extremities, no focal weaknesses or deficits noted.  She was given Robaxin in the emergency department reports that she has had some relief of symptoms.  She did have CT imaging which reveals no acute traumatic findings patient does have straightening of the normal lordotic curve consistent with muscle spasm.  Patient will be discharged home with further Robaxin, instructed on alternation of ice, heat, referred to Dr. Aparicio for further evaluation, definitive care patient given return precautions.    Amount and/or Complexity of Data Reviewed  Radiology: ordered. Decision-making details documented in ED Course.    Risk  Prescription drug management.                                      Clinical Impression:  Final diagnoses:  [S16.1XXA] Cervical strain, acute, initial encounter (Primary)  [M62.838] Neck muscle spasm          ED Disposition Condition    Discharge Stable          ED Prescriptions       Medication Sig Dispense Start Date End Date Auth. Provider     methocarbamoL (ROBAXIN) 750 MG Tab Take 1 tablet (750 mg total) by mouth 3 (three) times daily. for 5 days 15 tablet 5/29/2024 6/3/2024 Marielena Blanton FNP          Follow-up Information       Follow up With Specialties Details Why Contact Info    Ant Aparicio MD Physical Medicine and Rehabilitation Schedule an appointment as soon as possible for a visit in 2 days  [address] [place] [address] [address], [address]  [facility] [address]  [phone]               Marielena Blanton FNP  05/29/24 4671

## 2024-05-29 NOTE — Clinical Note
"Martha "Martha" Lamont was seen and treated in our emergency department on 5/29/2024.  She may return to work on 05/31/2024.       If you have any questions or concerns, please don't hesitate to call.      Marielena Blanton, DYANA"

## 2024-05-29 NOTE — FIRST PROVIDER EVALUATION
Emergency Department TeleTriage Encounter Note      CHIEF COMPLAINT    Chief Complaint   Patient presents with    Neck Pain     Right side, states she fell last week but unsure if related because she did not have pain at that time       VITAL SIGNS   Initial Vitals [05/29/24 1346]   BP Pulse Resp Temp SpO2   (!) 158/85 70 20 98.3 °F (36.8 °C) (!) 94 %      MAP       --            ALLERGIES    Review of patient's allergies indicates:   Allergen Reactions    Phenytoin Hives    Dilantin [phenytoin sodium extended] Rash    Lovenox [enoxaparin] Rash and Hives       PROVIDER TRIAGE NOTE  Patient presents with neck pain worse with movement since last night. Also hurts in the upper back. No chest pain, abdominal pain or neuro deficits. No recent trauma.       ORDERS  Labs Reviewed - No data to display    ED Orders (720h ago, onward)      None              Virtual Visit Note: The provider triage portion of this emergency department evaluation and documentation was performed via [employer], a HIPAA-compliant telemedicine application, in concert with a tele-presenter in the room. A face to face patient evaluation with one of my colleagues will occur once the patient is placed in an emergency department room.      DISCLAIMER: This note was prepared with [employer] voice recognition transcription software. Garbled syntax, mangled pronouns, and other bizarre constructions may be attributed to that software system.

## 2024-08-14 DIAGNOSIS — E11.9 TYPE 2 DIABETES MELLITUS WITHOUT COMPLICATION, WITHOUT LONG-TERM CURRENT USE OF INSULIN: Primary | ICD-10-CM

## 2024-08-14 DIAGNOSIS — E78.00 PURE HYPERCHOLESTEROLEMIA: ICD-10-CM

## 2024-08-14 DIAGNOSIS — I10 ESSENTIAL HYPERTENSION: ICD-10-CM

## 2024-08-15 RX ORDER — SEMAGLUTIDE 2.68 MG/ML
2 INJECTION, SOLUTION SUBCUTANEOUS
Qty: 3 ML | Refills: 11 | Status: SHIPPED | OUTPATIENT
Start: 2024-08-15 | End: 2025-08-15

## 2024-08-15 NOTE — TELEPHONE ENCOUNTER
Spoke with patient notified fasting lab orders available at [facility].  Patient instructed to complete labs one week prior to visit.  Informed patient to return call with questions/concerns.  Patient verbalized understanding to all -DN     Patient requesting refill on ozempic.  Rx pended -DN

## 2024-08-29 ENCOUNTER — OFFICE VISIT (OUTPATIENT)
Dept: FAMILY MEDICINE | Facility: CLINIC | Age: 64
End: 2024-08-29
Payer: MEDICARE

## 2024-08-29 VITALS
DIASTOLIC BLOOD PRESSURE: 74 MMHG | WEIGHT: 293 LBS | HEART RATE: 81 BPM | OXYGEN SATURATION: 97 % | HEIGHT: 69 IN | SYSTOLIC BLOOD PRESSURE: 116 MMHG | BODY MASS INDEX: 43.4 KG/M2

## 2024-08-29 DIAGNOSIS — E11.9 TYPE 2 DIABETES MELLITUS WITHOUT COMPLICATION, WITHOUT LONG-TERM CURRENT USE OF INSULIN: Primary | ICD-10-CM

## 2024-08-29 DIAGNOSIS — Z12.31 VISIT FOR SCREENING MAMMOGRAM: ICD-10-CM

## 2024-08-29 DIAGNOSIS — I10 ESSENTIAL HYPERTENSION: ICD-10-CM

## 2024-08-29 DIAGNOSIS — I71.23 ANEURYSM OF DESCENDING THORACIC AORTA WITHOUT RUPTURE: ICD-10-CM

## 2024-08-29 DIAGNOSIS — G47.33 OSA (OBSTRUCTIVE SLEEP APNEA): ICD-10-CM

## 2024-08-29 DIAGNOSIS — K59.09 CHRONIC CONSTIPATION: ICD-10-CM

## 2024-08-29 DIAGNOSIS — R05.8 COUGH DUE TO ACE INHIBITOR: ICD-10-CM

## 2024-08-29 DIAGNOSIS — T46.4X5A COUGH DUE TO ACE INHIBITOR: ICD-10-CM

## 2024-08-29 RX ORDER — VALSARTAN 40 MG/1
40 TABLET ORAL DAILY
Qty: 90 TABLET | Refills: 3 | Status: SHIPPED | OUTPATIENT
Start: 2024-08-29 | End: 2025-08-29

## 2024-08-29 NOTE — PROGRESS NOTES
SUBJECTIVE:    Patient ID: Martha Miguel is a 63 y.o. female.    Chief Complaint: 5M HTN/DM Check Up / Lab Review (-HepC / HIV Screening Due/-RSV / Covid Vaccinations Due/-Mammogram Order) and c/o phlegm in throat unable to cough up    Patient with hypertension and diabetes in for visit.  Diabetes is well-controlled with the use of Ozempic.  Hypertension stable with lisinopril and carvedilol.  She remains on this combination secondary to history of thoracic aortic aneurysm.  She has recently been evaluated by Cardiothoracic surgery Dr. Metzger and last findings were 4.4 cm.    Patient complains for a 2 to three-week history of persistent cough feeling as if something is stuck in her throat but is unable to cough it up.  Of note patient is on lisinopril.      She has had recent follow up with Dr. Mckeon.  She continues to use her CPAP with benefit.    Continuing to try to stay active to help with her weight.      Lipitor increased to 20 mg at last visit and cholesterol panel is in ideal range.  Renal function is normal as is electrolytes.          Refill on 08/14/2024   Component Date Value Ref Range Status    Cholesterol 08/20/2024 157  <200 mg/dL Final    HDL 08/20/2024 52  > OR = 50 mg/dL Final    Triglycerides 08/20/2024 97  <150 mg/dL Final    LDL Cholesterol 08/20/2024 86  mg/dL (calc) Final    HDL/Cholesterol Ratio 08/20/2024 3.0  <5.0 (calc) Final    Non HDL Chol. (LDL+VLDL) 08/20/2024 105  <130 mg/dL (calc) Final    Glucose 08/20/2024 85  65 - 99 mg/dL Final    BUN 08/20/2024 10  7 - 25 mg/dL Final    Creatinine 08/20/2024 0.89  0.50 - 1.05 mg/dL Final    eGFR 08/20/2024 72  > OR = 60 mL/min/1.73m2 Final    BUN/Creatinine Ratio 08/20/2024 SEE NOTE:  6 - 22 (calc) Final    Sodium 08/20/2024 141  135 - 146 mmol/L Final    Potassium 08/20/2024 4.7  3.5 - 5.3 mmol/L Final    Chloride 08/20/2024 104  98 - 110 mmol/L Final    CO2 08/20/2024 28  20 - 32 mmol/L Final    Calcium 08/20/2024 9.8  8.6 - 10.4 mg/dL  Final    Total Protein 08/20/2024 7.1  6.1 - 8.1 g/dL Final    Albumin 08/20/2024 4.2  3.6 - 5.1 g/dL Final    Globulin, Total 08/20/2024 2.9  1.9 - 3.7 g/dL (calc) Final    Albumin/Globulin Ratio 08/20/2024 1.4  1.0 - 2.5 (calc) Final    Total Bilirubin 08/20/2024 0.6  0.2 - 1.2 mg/dL Final    Alkaline Phosphatase 08/20/2024 83  37 - 153 U/L Final    AST 08/20/2024 12  10 - 35 U/L Final    ALT 08/20/2024 11  6 - 29 U/L Final    Hemoglobin A1C 08/20/2024 6.2 (H)  <5.7 % of total Hgb Final    Creatinine, Urine 08/20/2024 261  20 - 275 mg/dL Final    Microalb, Ur 08/20/2024 0.5  See Note: mg/dL Final    Microalb/Creat Ratio 08/20/2024 2  <30 mg/g creat Final   Patient Outreach on 04/11/2024   Component Date Value Ref Range Status    CRC Recommendation External 04/05/2024 Repeat colonoscopy in 5 years   Final        Past Medical History:   Diagnosis Date    Abnormal heart rhythm     Allergy     Aneurysm of abdominal vessel 2020    Arthritis     COPD (chronic obstructive pulmonary disease)     Coronary artery disease     Depression     Diabetes mellitus, type 2     Essential hypertension 08/16/2016    Pure hypercholesterolemia 8/25/2023    Right sided sciatica 04/23/2018     Past Surgical History:   Procedure Laterality Date    CHOLECYSTECTOMY      FRACTURE SURGERY      HYSTERECTOMY      INJECTION OF ANESTHETIC AGENT AROUND MEDIAL BRANCH NERVES INNERVATING LUMBAR FACET JOINT Bilateral 7/5/2018    Procedure: Block-nerve-medial branch-lumbar;  Surgeon: Arthur Orellana MD;  Location: [facility] OR;  Service: Pain Management;  Laterality: Bilateral;  L2, 3, 4, 5    TONSILLECTOMY       Family History   Family history unknown: Yes       Marital Status:   Alcohol History:  reports no history of alcohol use.  Tobacco History:  reports that she has never smoked. She has never used smokeless tobacco.  Drug History:  reports no history of drug use.    Review of patient's allergies indicates:   Allergen Reactions    Phenytoin Hives     Dilantin [phenytoin sodium extended] Rash    Lovenox [enoxaparin] Rash and Hives       Current Outpatient Medications:     aspirin (ECOTRIN) 81 MG EC tablet, Take 81 mg by mouth once daily., Disp: , Rfl:     atorvastatin (LIPITOR) 20 MG tablet, Take 1 tablet (20 mg total) by mouth once daily., Disp: 90 tablet, Rfl: 3    blood sugar diagnostic Strp, To check BG 1 times daily, to use with insurance preferred meter, Disp: 200 each, Rfl: 5    blood-glucose meter kit, Use as instructed, Disp: 1 each, Rfl: 0    carvediloL (COREG) 3.125 MG tablet, Take 3.125 mg by mouth 2 (two) times daily with meals., Disp: , Rfl:     gabapentin (NEURONTIN) 100 MG capsule, Take 1 capsule (100 mg total) by mouth 3 (three) times daily as needed (neuropathy and sciatic pain)., Disp: 90 capsule, Rfl: 11    lancets 32 gauge Misc, Test glucose twice daily, Disp: 200 each, Rfl: 3    linaCLOtide (LINZESS) 290 mcg Cap capsule, Take 1 capsule (290 mcg total) by mouth before breakfast., Disp: 30 capsule, Rfl: 11    semaglutide (OZEMPIC) 2 mg/dose (8 mg/3 mL) PnIj, Inject 2 mg into the skin every 7 days., Disp: 3 mL, Rfl: 11    valsartan (DIOVAN) 40 MG tablet, Take 1 tablet (40 mg total) by mouth once daily., Disp: 90 tablet, Rfl: 3    Review of Systems   Constitutional:  Positive for activity change.   HENT:  Negative for hearing loss and trouble swallowing.    Eyes:  Negative for discharge.   Respiratory:  Positive for wheezing. Negative for chest tightness.    Cardiovascular:  Negative for chest pain and palpitations.   Gastrointestinal:  Positive for constipation. Negative for diarrhea and vomiting.   Genitourinary:  Negative for difficulty urinating and hematuria.   Neurological:  Negative for headaches.   Psychiatric/Behavioral:  Negative for dysphoric mood.           Objective:          5/29/2024     1:46 PM 8/29/2024     9:37 AM   Vitals - 1 value per visit   SYSTOLIC 158 116   DIASTOLIC 85 74   Pulse 70 81   Temp 98.3 °F (36.8 °C)    Resp  "20    SPO2 94 % 97 %   Weight (lb) 330 323   Weight (kg) 149.687 146.512   Height  5' 9" (1.753 m)   BMI (Calculated)  47.7      Physical Exam  Constitutional:       Appearance: Normal appearance.   HENT:      Head: Normocephalic and atraumatic.      Mouth/Throat:      Mouth: Mucous membranes are moist.   Eyes:      Conjunctiva/sclera: Conjunctivae normal.   Pulmonary:      Effort: Pulmonary effort is normal.   Neurological:      General: No focal deficit present.      Mental Status: She is alert and oriented to person, place, and time.   Psychiatric:         Mood and Affect: Mood normal.         Behavior: Behavior normal.           Assessment:       1. Type 2 diabetes mellitus without complication, without long-term current use of insulin    2. Essential hypertension    3. Chronic constipation    4. Aneurysm of descending thoracic aorta without rupture    5. ZABRINA (obstructive sleep apnea)    6. Cough due to ACE inhibitor    7. Visit for screening mammogram         Plan:       Type 2 diabetes mellitus without complication, without long-term current use of insulin  -     valsartan (DIOVAN) 40 MG tablet; Take 1 tablet (40 mg total) by mouth once daily.  Dispense: 90 tablet; Refill: 3    Essential hypertension  -     valsartan (DIOVAN) 40 MG tablet; Take 1 tablet (40 mg total) by mouth once daily.  Dispense: 90 tablet; Refill: 3    Chronic constipation  Comments:  regular use of linzess recommended    Aneurysm of descending thoracic aorta without rupture  -     valsartan (DIOVAN) 40 MG tablet; Take 1 tablet (40 mg total) by mouth once daily.  Dispense: 90 tablet; Refill: 3    ZABRINA (obstructive sleep apnea)  Comments:  cpap nightly    Cough due to ACE inhibitor  Comments:  lisinopril changed to valsartan    Visit for screening mammogram  -     Mammo Digital Screening Lyudmila w/ Aj; Future; Expected date: 09/20/2024      Medication List with Changes/Refills   New Medications    VALSARTAN (DIOVAN) 40 MG TABLET    Take 1 " tablet (40 mg total) by mouth once daily.   Current Medications    ASPIRIN (ECOTRIN) 81 MG EC TABLET    Take 81 mg by mouth once daily.    ATORVASTATIN (LIPITOR) 20 MG TABLET    Take 1 tablet (20 mg total) by mouth once daily.    BLOOD SUGAR DIAGNOSTIC STRP    To check BG 1 times daily, to use with insurance preferred meter    BLOOD-GLUCOSE METER KIT    Use as instructed    CARVEDILOL (COREG) 3.125 MG TABLET    Take 3.125 mg by mouth 2 (two) times daily with meals.    GABAPENTIN (NEURONTIN) 100 MG CAPSULE    Take 1 capsule (100 mg total) by mouth 3 (three) times daily as needed (neuropathy and sciatic pain).    LANCETS 32 GAUGE MISC    Test glucose twice daily    LINACLOTIDE (LINZESS) 290 MCG CAP CAPSULE    Take 1 capsule (290 mcg total) by mouth before breakfast.    SEMAGLUTIDE (OZEMPIC) 2 MG/DOSE (8 MG/3 ML) PNIJ    Inject 2 mg into the skin every 7 days.   Discontinued Medications    L. ACIDOPHILUS/L. RHAMNOSUS (DIGESTIVE HEALTH PROBIOTIC ORAL)    Take 1 tablet by mouth once daily.    LISINOPRIL (PRINIVIL,ZESTRIL) 2.5 MG TABLET    Take 1 tablet (2.5 mg total) by mouth once daily.    MELOXICAM (MOBIC) 15 MG TABLET    Take 1 tablet (15 mg total) by mouth once daily.    METFORMIN (GLUCOPHAGE) 1000 MG TABLET    Take 1 tablet (1,000 mg total) by mouth 2 (two) times daily with meals.    SOLIFENACIN (VESICARE) 10 MG TABLET    Take 1 tablet (10 mg total) by mouth once daily. For bladder      Follow up in about 6 months (around 2/28/2025) for Diabetic Check-Up, HTN.      Stable on medications continue current

## 2024-09-03 ENCOUNTER — PATIENT MESSAGE (OUTPATIENT)
Dept: ADMINISTRATIVE | Facility: HOSPITAL | Age: 64
End: 2024-09-03
Payer: MEDICARE

## 2024-09-11 DIAGNOSIS — Z78.0 MENOPAUSE: ICD-10-CM

## 2024-09-20 ENCOUNTER — HOSPITAL ENCOUNTER (OUTPATIENT)
Dept: RADIOLOGY | Facility: HOSPITAL | Age: 64
Discharge: HOME | End: 2024-09-20
Attending: FAMILY MEDICINE
Payer: MEDICARE

## 2024-09-20 VITALS — HEIGHT: 69 IN | BODY MASS INDEX: 43.4 KG/M2 | WEIGHT: 293 LBS

## 2024-09-20 DIAGNOSIS — Z12.31 VISIT FOR SCREENING MAMMOGRAM: ICD-10-CM

## 2024-09-20 PROCEDURE — 77067 SCR MAMMO BI INCL CAD: CPT | Mod: TC,PO

## 2024-09-20 PROCEDURE — 77063 BREAST TOMOSYNTHESIS BI: CPT | Mod: 26,,, | Performed by: RADIOLOGY

## 2024-09-20 PROCEDURE — 77067 SCR MAMMO BI INCL CAD: CPT | Mod: 26,,, | Performed by: RADIOLOGY

## 2024-09-27 ENCOUNTER — HOSPITAL ENCOUNTER (OUTPATIENT)
Dept: RADIOLOGY | Facility: HOSPITAL | Age: 64
Discharge: HOME | End: 2024-09-27
Attending: FAMILY MEDICINE
Payer: MEDICARE

## 2024-09-27 DIAGNOSIS — Z78.0 MENOPAUSE: ICD-10-CM

## 2024-09-27 PROCEDURE — 77080 DXA BONE DENSITY AXIAL: CPT | Mod: TC,PO

## 2024-09-27 PROCEDURE — 77080 DXA BONE DENSITY AXIAL: CPT | Mod: 26,,, | Performed by: RADIOLOGY

## 2024-12-23 ENCOUNTER — CLINICAL SUPPORT (OUTPATIENT)
Dept: FAMILY MEDICINE | Facility: CLINIC | Age: 64
End: 2024-12-23
Payer: MEDICARE

## 2024-12-23 DIAGNOSIS — Z23 FLU VACCINE NEED: Primary | ICD-10-CM

## 2024-12-23 PROCEDURE — G0008 ADMIN INFLUENZA VIRUS VAC: HCPCS | Mod: S$GLB,,, | Performed by: FAMILY MEDICINE

## 2024-12-23 PROCEDURE — 90653 IIV ADJUVANT VACCINE IM: CPT | Mod: S$GLB,,, | Performed by: FAMILY MEDICINE

## 2024-12-27 ENCOUNTER — TELEPHONE (OUTPATIENT)
Dept: FAMILY MEDICINE | Facility: CLINIC | Age: 64
End: 2024-12-27
Payer: MEDICARE

## 2024-12-27 DIAGNOSIS — M15.0 PRIMARY OSTEOARTHRITIS INVOLVING MULTIPLE JOINTS: Primary | ICD-10-CM

## 2024-12-27 DIAGNOSIS — M25.561 RIGHT KNEE PAIN, UNSPECIFIED CHRONICITY: ICD-10-CM

## 2024-12-27 NOTE — TELEPHONE ENCOUNTER
----- Message from Sirena sent at 12/27/2024 10:27 AM CST -----  The Patient came in for her Flu shot last week and she left a message that she needs a referral to an Orthopedic. RT knee pain. This is the Drs # [phone]. She did not know the names of the Drs. She can not get an appointment until they have a referral.  Pt's # [phone]

## 2024-12-27 NOTE — TELEPHONE ENCOUNTER
Patient unaware what the provider or clinic name is, said she's seen MIS French before and would like to see him. Explained he is @ [facility] and pended the referral to Dr. Kee to sign

## 2024-12-30 ENCOUNTER — TELEPHONE (OUTPATIENT)
Dept: FAMILY MEDICINE | Facility: CLINIC | Age: 64
End: 2024-12-30
Payer: MEDICARE

## 2024-12-30 DIAGNOSIS — M25.561 RIGHT KNEE PAIN, UNSPECIFIED CHRONICITY: Primary | ICD-10-CM

## 2024-12-30 NOTE — TELEPHONE ENCOUNTER
----- Message from Pari sent at 12/30/2024  2:23 PM CST -----  The provider that was referred to does not see patients. Patient needing another referral. Please contact patient with new referral information. Dr. Sanchez   Office# [phone]  Fax# [phone]  Pt # [phone]

## 2024-12-31 DIAGNOSIS — M25.561 RIGHT KNEE PAIN, UNSPECIFIED CHRONICITY: Primary | ICD-10-CM

## 2025-01-06 ENCOUNTER — HOSPITAL ENCOUNTER (OUTPATIENT)
Dept: RADIOLOGY | Facility: HOSPITAL | Age: 65
Discharge: HOME | End: 2025-01-06
Attending: ORTHOPAEDIC SURGERY
Payer: MEDICARE

## 2025-01-06 ENCOUNTER — OFFICE VISIT (OUTPATIENT)
Dept: ORTHOPEDICS | Facility: CLINIC | Age: 65
End: 2025-01-06
Payer: MEDICARE

## 2025-01-06 VITALS — BODY MASS INDEX: 43.4 KG/M2 | WEIGHT: 293 LBS | HEIGHT: 69 IN | RESPIRATION RATE: 18 BRPM

## 2025-01-06 DIAGNOSIS — E66.01 MORBID OBESITY: ICD-10-CM

## 2025-01-06 DIAGNOSIS — M17.10 ARTHRITIS OF KNEE: Primary | ICD-10-CM

## 2025-01-06 DIAGNOSIS — M25.561 RIGHT KNEE PAIN, UNSPECIFIED CHRONICITY: ICD-10-CM

## 2025-01-06 PROCEDURE — 20610 DRAIN/INJ JOINT/BURSA W/O US: CPT | Mod: RT,S$GLB,, | Performed by: ORTHOPAEDIC SURGERY

## 2025-01-06 PROCEDURE — 73564 X-RAY EXAM KNEE 4 OR MORE: CPT | Mod: 26,RT,, | Performed by: RADIOLOGY

## 2025-01-06 PROCEDURE — 99204 OFFICE O/P NEW MOD 45 MIN: CPT | Mod: 25,S$GLB,, | Performed by: ORTHOPAEDIC SURGERY

## 2025-01-06 PROCEDURE — 3288F FALL RISK ASSESSMENT DOCD: CPT | Mod: CPTII,S$GLB,, | Performed by: ORTHOPAEDIC SURGERY

## 2025-01-06 PROCEDURE — 1125F AMNT PAIN NOTED PAIN PRSNT: CPT | Mod: CPTII,S$GLB,, | Performed by: ORTHOPAEDIC SURGERY

## 2025-01-06 PROCEDURE — 99999 PR PBB SHADOW E&M-EST. PATIENT-LVL III: CPT | Mod: PBBFAC,,, | Performed by: ORTHOPAEDIC SURGERY

## 2025-01-06 PROCEDURE — 3008F BODY MASS INDEX DOCD: CPT | Mod: CPTII,S$GLB,, | Performed by: ORTHOPAEDIC SURGERY

## 2025-01-06 PROCEDURE — 73564 X-RAY EXAM KNEE 4 OR MORE: CPT | Mod: TC,PO,RT

## 2025-01-06 PROCEDURE — 1159F MED LIST DOCD IN RCRD: CPT | Mod: CPTII,S$GLB,, | Performed by: ORTHOPAEDIC SURGERY

## 2025-01-06 PROCEDURE — 73562 X-RAY EXAM OF KNEE 3: CPT | Mod: 26,59,LT, | Performed by: RADIOLOGY

## 2025-01-06 PROCEDURE — 1101F PT FALLS ASSESS-DOCD LE1/YR: CPT | Mod: CPTII,S$GLB,, | Performed by: ORTHOPAEDIC SURGERY

## 2025-01-06 RX ORDER — TRIAMCINOLONE ACETONIDE 40 MG/ML
40 INJECTION, SUSPENSION INTRA-ARTICULAR; INTRAMUSCULAR
Status: DISCONTINUED | OUTPATIENT
Start: 2025-01-06 | End: 2025-01-06 | Stop reason: HOSPADM

## 2025-01-06 RX ADMIN — TRIAMCINOLONE ACETONIDE 40 MG: 40 INJECTION, SUSPENSION INTRA-ARTICULAR; INTRAMUSCULAR at 09:01

## 2025-01-06 NOTE — PROGRESS NOTES
Past Medical History:   Diagnosis Date    Abnormal heart rhythm     Allergy     Aneurysm of abdominal vessel 2020    Arthritis     COPD (chronic obstructive pulmonary disease)     Coronary artery disease     Depression     Diabetes mellitus, type 2     Essential hypertension 08/16/2016    Pure hypercholesterolemia 8/25/2023    Right sided sciatica 04/23/2018       Past Surgical History:   Procedure Laterality Date    CHOLECYSTECTOMY      FRACTURE SURGERY      HYSTERECTOMY      INJECTION OF ANESTHETIC AGENT AROUND MEDIAL BRANCH NERVES INNERVATING LUMBAR FACET JOINT Bilateral 7/5/2018    Procedure: Block-nerve-medial branch-lumbar;  Surgeon: Arthur Orellana MD;  Location: [facility];  Service: Pain Management;  Laterality: Bilateral;  L2, 3, 4, 5    TONSILLECTOMY         Current Outpatient Medications   Medication Sig    aspirin (ECOTRIN) 81 MG EC tablet Take 81 mg by mouth once daily.    atorvastatin (LIPITOR) 20 MG tablet Take 1 tablet (20 mg total) by mouth once daily.    blood sugar diagnostic Strp To check BG 1 times daily, to use with insurance preferred meter    blood-glucose meter kit Use as instructed    carvediloL (COREG) 3.125 MG tablet Take 3.125 mg by mouth 2 (two) times daily with meals.    gabapentin (NEURONTIN) 100 MG capsule Take 1 capsule (100 mg total) by mouth 3 (three) times daily as needed (neuropathy and sciatic pain).    lancets 32 gauge Misc Test glucose twice daily    semaglutide (OZEMPIC) 2 mg/dose (8 mg/3 mL) PnIj Inject 2 mg into the skin every 7 days.    valsartan (DIOVAN) 40 MG tablet Take 1 tablet (40 mg total) by mouth once daily.     No current facility-administered medications for this visit.       Review of patient's allergies indicates:   Allergen Reactions    Phenytoin Hives    Dilantin [phenytoin sodium extended] Rash    Lovenox [enoxaparin] Rash and Hives       Family History   Family history unknown: Yes       Social History     Socioeconomic History    Marital status:     Tobacco Use    Smoking status: Never    Smokeless tobacco: Never   Substance and Sexual Activity    Alcohol use: No    Drug use: No    Sexual activity: Never     Social Drivers of Health     Financial Resource Strain: Low Risk  (2/29/2024)    Overall Financial Resource Strain (CARDIA)     Difficulty of Paying Living Expenses: Not very hard   Food Insecurity: No Food Insecurity (2/29/2024)    Hunger Vital Sign     Worried About Running Out of Food in the Last Year: Never true     Ran Out of Food in the Last Year: Never true   Transportation Needs: No Transportation Needs (2/29/2024)    PRAPARE - Transportation     Lack of Transportation (Medical): No     Lack of Transportation (Non-Medical): No   Physical Activity: Unknown (2/29/2024)    Exercise Vital Sign     Days of Exercise per Week: 3 days   Stress: No Stress Concern Present (2/29/2024)    [employer] - Occupational Stress Questionnaire     Feeling of Stress : Only a little   Housing Stability: Low Risk  (2/29/2024)    Housing Stability Vital Sign     Unable to Pay for Housing in the Last Year: No     Number of Places Lived in the Last Year: 1     Unstable Housing in the Last Year: No       Chief Complaint: No chief complaint on file.      History of present illness:  64-year-old female seen for right knee pain.  Patient twisted it getting out of the bathtub about a month ago.  Pain along the medial aspect of her knee.  Does have a history of knee arthritis with a previous cortisone injection back in June of 2023 that was resolved with a cortisone injection.  No recent treatment.  Pain is a 5/10.    Prior treatment:  Cortisone injection        Review of Systems:    Constitution: Negative for chills, fever, and sweats.  Negative for unexplained weight loss.    HENT:  Negative for headaches and blurry vision.    Cardiovascular:Negative for chest pain or irregular heart beat. Negative for hypertension.    Respiratory:  Negative for  cough and shortness of breath.    Gastrointestinal: Negative for abdominal pain, heartburn, melena, nausea, and vomitting.    Genitourinary:  Negative bladder incontinence and dysuria.    Musculoskeletal:  See HPI    Neurological: Negative for numbness.    Psychiatric/Behavioral: Negative for depression.  The patient is not nervous/anxious.      Endocrine: Negative for polyuria    Hematologic/Lymphatic: Negative for bleeding problem.  Does not bruise/bleed easily.    Skin: Negative for poor would healing and rash      Physical Examination:    Vital Signs:  There were no vitals filed for this visit.    There is no height or weight on file to calculate BMI.    This a well-developed, well nourished patient in no acute distress.  They are alert and oriented and cooperative to examination.  Pt. walks without an antalgic gait.      Examination of the right knee shows no rashes or erythema. There are no masses ecchymosis or effusion. Patient has full range of motion from 0-130°. Patient is nontender to palpation over lateral joint line and mildly tender to palpation over the medial joint line. Patient has a - Lachman exam, - anterior drawer exam, and - posterior drawer exam. - Apley exam. Knee is stable to varus and valgus stress. 5 out of 5 motor strength. Palpable distal pulses. Intact light touch sensation. Negative Patellofemoral crepitus      X-rays:  X-rays of the right knee are ordered and reviewed which show no significant pathology with very mild medial compartment arthritis         Assessment:  Right knee osteoarthritis    Plan:  I reviewed the findings with her today.  Recommended a cortisone injection to calm her knee down.  This seemed to work for her previously.  Follow up if the symptoms should continue to progress or if she develops mechanical symptoms.  Next step would be an MRI.            All previous pertinent notes including ER visits, physical therapy visits, other orthopedic visits as well as other  care for the same musculoskeletal problem were reviewed.  All pertinent lab values and previous imaging was reviewed pertinent to the current visit.    This note was created using [employer] voice recognition software that occasionally misinterpreted phrases or words.    Consult note is delivered via Epic messaging service.

## 2025-01-06 NOTE — PROCEDURES
Large Joint Aspiration/Injection: R knee    Date/Time: 1/6/2025 9:30 AM    Performed by: Alfa Sanchez MD  Authorized by: Alfa Sanchez MD    Consent Done?:  Yes (Verbal)  Indications:  Pain  Site marked: the procedure site was marked    Timeout: prior to procedure the correct patient, procedure, and site was verified    Local anesthetic: Ropivicaine.  Anesthetic total (ml):  3      Details:  Needle Size:  20 G  Approach:  Anterolateral  Location:  Knee  Site:  R knee  Medications:  40 mg triamcinolone acetonide 40 mg/mL  Patient tolerance:  Patient tolerated the procedure well with no immediate complications

## 2025-01-21 ENCOUNTER — NURSE TRIAGE (OUTPATIENT)
Dept: ADMINISTRATIVE | Facility: CLINIC | Age: 65
End: 2025-01-21
Payer: MEDICARE

## 2025-01-21 ENCOUNTER — HOSPITAL ENCOUNTER (EMERGENCY)
Facility: HOSPITAL | Age: 65
Discharge: HOME | End: 2025-01-21
Attending: EMERGENCY MEDICINE
Payer: MEDICARE

## 2025-01-21 VITALS
DIASTOLIC BLOOD PRESSURE: 65 MMHG | RESPIRATION RATE: 19 BRPM | TEMPERATURE: 99 F | HEIGHT: 69 IN | OXYGEN SATURATION: 100 % | SYSTOLIC BLOOD PRESSURE: 146 MMHG | HEART RATE: 60 BPM | WEIGHT: 293 LBS | BODY MASS INDEX: 43.4 KG/M2

## 2025-01-21 DIAGNOSIS — R31.9 HEMATURIA, UNSPECIFIED TYPE: Primary | ICD-10-CM

## 2025-01-21 DIAGNOSIS — N39.0 URINARY TRACT INFECTION WITHOUT HEMATURIA, SITE UNSPECIFIED: ICD-10-CM

## 2025-01-21 LAB
ALBUMIN SERPL BCP-MCNC: 3.7 G/DL (ref 3.5–5.2)
ALP SERPL-CCNC: 66 U/L (ref 55–135)
ALT SERPL W/O P-5'-P-CCNC: 11 U/L (ref 10–44)
ANION GAP SERPL CALC-SCNC: 7 MMOL/L (ref 8–16)
AST SERPL-CCNC: 10 U/L (ref 10–40)
BACTERIA #/AREA URNS HPF: ABNORMAL /HPF
BASOPHILS # BLD AUTO: 0.03 K/UL (ref 0–0.2)
BASOPHILS NFR BLD: 0.4 % (ref 0–1.9)
BILIRUB SERPL-MCNC: 0.4 MG/DL (ref 0.1–1)
BILIRUB UR QL STRIP: NEGATIVE
BUN SERPL-MCNC: 15 MG/DL (ref 8–23)
CALCIUM SERPL-MCNC: 8.7 MG/DL (ref 8.7–10.5)
CHLORIDE SERPL-SCNC: 104 MMOL/L (ref 95–110)
CLARITY UR: ABNORMAL
CO2 SERPL-SCNC: 24 MMOL/L (ref 23–29)
COLOR UR: ABNORMAL
CREAT SERPL-MCNC: 1 MG/DL (ref 0.5–1.4)
DIFFERENTIAL METHOD BLD: NORMAL
EOSINOPHIL # BLD AUTO: 0.1 K/UL (ref 0–0.5)
EOSINOPHIL NFR BLD: 1.2 % (ref 0–8)
ERYTHROCYTE [DISTWIDTH] IN BLOOD BY AUTOMATED COUNT: 13.2 % (ref 11.5–14.5)
EST. GFR  (NO RACE VARIABLE): >60 ML/MIN/1.73 M^2
GLUCOSE SERPL-MCNC: 100 MG/DL (ref 70–110)
GLUCOSE UR QL STRIP: NEGATIVE
HCT VFR BLD AUTO: 43 % (ref 37–48.5)
HGB BLD-MCNC: 14.2 G/DL (ref 12–16)
HGB UR QL STRIP: ABNORMAL
HYALINE CASTS #/AREA URNS LPF: 0 /LPF
IMM GRANULOCYTES # BLD AUTO: 0.03 K/UL (ref 0–0.04)
IMM GRANULOCYTES NFR BLD AUTO: 0.4 % (ref 0–0.5)
KETONES UR QL STRIP: NEGATIVE
LEUKOCYTE ESTERASE UR QL STRIP: ABNORMAL
LIPASE SERPL-CCNC: 37 U/L (ref 4–60)
LYMPHOCYTES # BLD AUTO: 2.7 K/UL (ref 1–4.8)
LYMPHOCYTES NFR BLD: 31.6 % (ref 18–48)
MCH RBC QN AUTO: 30.3 PG (ref 27–31)
MCHC RBC AUTO-ENTMCNC: 33 G/DL (ref 32–36)
MCV RBC AUTO: 92 FL (ref 82–98)
MICROSCOPIC COMMENT: ABNORMAL
MONOCYTES # BLD AUTO: 0.9 K/UL (ref 0.3–1)
MONOCYTES NFR BLD: 10.3 % (ref 4–15)
NEUTROPHILS # BLD AUTO: 4.7 K/UL (ref 1.8–7.7)
NEUTROPHILS NFR BLD: 56.1 % (ref 38–73)
NITRITE UR QL STRIP: NEGATIVE
NON-SQ EPI CELLS #/AREA URNS HPF: 2 /HPF
NRBC BLD-RTO: 0 /100 WBC
PH UR STRIP: 6 [PH] (ref 5–8)
PLATELET # BLD AUTO: 284 K/UL (ref 150–450)
PMV BLD AUTO: 11.4 FL (ref 9.2–12.9)
POTASSIUM SERPL-SCNC: 4.1 MMOL/L (ref 3.5–5.1)
PROT SERPL-MCNC: 6.4 G/DL (ref 6–8.4)
PROT UR QL STRIP: ABNORMAL
RBC # BLD AUTO: 4.68 M/UL (ref 4–5.4)
RBC #/AREA URNS HPF: >100 /HPF (ref 0–4)
SODIUM SERPL-SCNC: 135 MMOL/L (ref 136–145)
SP GR UR STRIP: 1.02 (ref 1–1.03)
SQUAMOUS #/AREA URNS HPF: 14 /HPF
URN SPEC COLLECT METH UR: ABNORMAL
UROBILINOGEN UR STRIP-ACNC: NEGATIVE EU/DL
WBC # BLD AUTO: 8.41 K/UL (ref 3.9–12.7)
WBC #/AREA URNS HPF: >100 /HPF (ref 0–5)
WBC CLUMPS URNS QL MICRO: ABNORMAL
YEAST URNS QL MICRO: ABNORMAL

## 2025-01-21 PROCEDURE — 80053 COMPREHEN METABOLIC PANEL: CPT | Performed by: EMERGENCY MEDICINE

## 2025-01-21 PROCEDURE — 96375 TX/PRO/DX INJ NEW DRUG ADDON: CPT

## 2025-01-21 PROCEDURE — 25000003 PHARM REV CODE 250: Performed by: EMERGENCY MEDICINE

## 2025-01-21 PROCEDURE — 81001 URINALYSIS AUTO W/SCOPE: CPT | Performed by: EMERGENCY MEDICINE

## 2025-01-21 PROCEDURE — 99285 EMERGENCY DEPT VISIT HI MDM: CPT | Mod: 25

## 2025-01-21 PROCEDURE — 85025 COMPLETE CBC W/AUTO DIFF WBC: CPT | Performed by: EMERGENCY MEDICINE

## 2025-01-21 PROCEDURE — 96361 HYDRATE IV INFUSION ADD-ON: CPT

## 2025-01-21 PROCEDURE — 36415 COLL VENOUS BLD VENIPUNCTURE: CPT | Performed by: EMERGENCY MEDICINE

## 2025-01-21 PROCEDURE — 63600175 PHARM REV CODE 636 W HCPCS: Performed by: EMERGENCY MEDICINE

## 2025-01-21 PROCEDURE — 96374 THER/PROPH/DIAG INJ IV PUSH: CPT

## 2025-01-21 PROCEDURE — 87086 URINE CULTURE/COLONY COUNT: CPT | Performed by: EMERGENCY MEDICINE

## 2025-01-21 PROCEDURE — 83690 ASSAY OF LIPASE: CPT | Performed by: EMERGENCY MEDICINE

## 2025-01-21 RX ORDER — CEFUROXIME AXETIL 500 MG/1
500 TABLET ORAL EVERY 12 HOURS
Qty: 4 TABLET | Refills: 0 | Status: SHIPPED | OUTPATIENT
Start: 2025-01-21 | End: 2025-01-23

## 2025-01-21 RX ORDER — CEFUROXIME AXETIL 500 MG/1
500 TABLET ORAL EVERY 12 HOURS
Qty: 20 TABLET | Refills: 0 | Status: SHIPPED | OUTPATIENT
Start: 2025-01-21 | End: 2025-01-21

## 2025-01-21 RX ORDER — HYDROMORPHONE HYDROCHLORIDE 1 MG/ML
1 INJECTION, SOLUTION INTRAMUSCULAR; INTRAVENOUS; SUBCUTANEOUS
Status: COMPLETED | OUTPATIENT
Start: 2025-01-21 | End: 2025-01-21

## 2025-01-21 RX ORDER — CEFUROXIME AXETIL 500 MG/1
500 TABLET ORAL EVERY 12 HOURS
Qty: 20 TABLET | Refills: 0 | Status: SHIPPED | OUTPATIENT
Start: 2025-01-21

## 2025-01-21 RX ORDER — CEFTRIAXONE 2 G/1
2 INJECTION, POWDER, FOR SOLUTION INTRAMUSCULAR; INTRAVENOUS
Status: COMPLETED | OUTPATIENT
Start: 2025-01-21 | End: 2025-01-21

## 2025-01-21 RX ADMIN — SODIUM CHLORIDE 1000 ML: 0.9 INJECTION, SOLUTION INTRAVENOUS at 01:01

## 2025-01-21 RX ADMIN — CEFTRIAXONE 2 G: 2 INJECTION, POWDER, FOR SOLUTION INTRAMUSCULAR; INTRAVENOUS at 01:01

## 2025-01-21 RX ADMIN — HYDROMORPHONE HYDROCHLORIDE 1 MG: 1 INJECTION, SOLUTION INTRAMUSCULAR; INTRAVENOUS; SUBCUTANEOUS at 01:01

## 2025-01-21 NOTE — TELEPHONE ENCOUNTER
Patient c/o hematuria and dysuria. Advised per protocol to be seem within 4 hours. Patient verbalizes understanding. Advised the patient to call back with any further questions or if symptoms worsen.          Reason for Disposition   Diabetes mellitus or weak immune system (e.g., HIV positive, cancer chemo, splenectomy, organ transplant, chronic steroids)    Additional Information   Negative: Shock suspected (e.g., cold/pale/clammy skin, too weak to stand, low BP, rapid pulse)   Negative: Sounds like a life-threatening emergency to the triager   Negative: [1] Unable to urinate (or only a few drops) > 4 hours AND [2] bladder feels very full (e.g., palpable bladder or strong urge to urinate)   Negative: Vomiting   Negative: Patient sounds very sick or weak to the triager   Negative: [1] SEVERE pain with urination (e.g., excruciating) AND [2] not improved after 2 hours of pain medicine and Sitz bath   Negative: Fever > 100.4 F (38.0 C)   Negative: Side (flank) or lower back pain present    Protocols used: Urination Pain - Female-A-AH

## 2025-01-21 NOTE — ED PROVIDER NOTES
Encounter Date: 1/21/2025       History     Chief Complaint   Patient presents with    Hematuria     Pt state burning on urination and bleeding on urination since last night     64-year-old female presented emergency department with lower abdominal pain and hematuria and dysuria.  Patient denies fever or chills or chest pain or shortness of breath.  Patient has previous history of urinary tract infection and patient states this feels similar.  Denies any nausea vomiting or chest pain.      Review of patient's allergies indicates:   Allergen Reactions    Phenytoin Hives    Dilantin [phenytoin sodium extended] Rash    Lovenox [enoxaparin] Rash and Hives     Past Medical History:   Diagnosis Date    Abnormal heart rhythm     Allergy     Aneurysm of abdominal vessel 2020    Arthritis     COPD (chronic obstructive pulmonary disease)     Coronary artery disease     Depression     Diabetes mellitus, type 2     Essential hypertension 08/16/2016    Pure hypercholesterolemia 8/25/2023    Right sided sciatica 04/23/2018     Past Surgical History:   Procedure Laterality Date    CHOLECYSTECTOMY      FRACTURE SURGERY      HYSTERECTOMY      INJECTION OF ANESTHETIC AGENT AROUND MEDIAL BRANCH NERVES INNERVATING LUMBAR FACET JOINT Bilateral 7/5/2018    Procedure: Block-nerve-medial branch-lumbar;  Surgeon: Arthur Orellana MD;  Location: [facility] OR;  Service: Pain Management;  Laterality: Bilateral;  L2, 3, 4, 5    TONSILLECTOMY       Family History   Family history unknown: Yes     Social History     Tobacco Use    Smoking status: Never    Smokeless tobacco: Never   Substance Use Topics    Alcohol use: No    Drug use: No     Review of Systems   Constitutional: Negative.    HENT: Negative.     Eyes: Negative.    Respiratory: Negative.     Cardiovascular: Negative.  Negative for chest pain.   Gastrointestinal:  Positive for abdominal pain.   Endocrine: Negative.    Genitourinary:  Positive for dysuria, frequency and hematuria.    Musculoskeletal: Negative.    Skin: Negative.    Allergic/Immunologic: Negative.    Neurological: Negative.    Hematological: Negative.    Psychiatric/Behavioral: Negative.     All other systems reviewed and are negative.      Physical Exam     Initial Vitals [01/21/25 1153]   BP Pulse Resp Temp SpO2   119/79 64 18 98.6 °F (37 °C) 100 %      MAP       --         Physical Exam    Nursing note and vitals reviewed.  Constitutional: She appears well-developed and well-nourished.   HENT:   Head: Normocephalic and atraumatic.   Nose: Nose normal. Mouth/Throat: Oropharynx is clear and moist.   Eyes: Conjunctivae and EOM are normal. Pupils are equal, round, and reactive to light.   Neck: Neck supple. No thyromegaly present. No tracheal deviation present. No JVD present.   Normal range of motion.  Cardiovascular:  Normal rate, regular rhythm, normal heart sounds and intact distal pulses.           No murmur heard.  Pulmonary/Chest: Breath sounds normal. No stridor. No respiratory distress. She has no wheezes. She has no rales.   Abdominal: Abdomen is soft. Bowel sounds are normal. There is abdominal tenderness.   Suprapubic tenderness   Musculoskeletal:         General: No edema. Normal range of motion.      Cervical back: Normal range of motion and neck supple.     Neurological: She is alert and oriented to person, place, and time. She has normal strength. GCS score is 15. GCS eye subscore is 4. GCS verbal subscore is 5. GCS motor subscore is 6.   Skin: Skin is warm. Capillary refill takes less than 2 seconds.   Psychiatric: She has a normal mood and affect. Thought content normal.         ED Course   Procedures  Labs Reviewed   URINALYSIS, REFLEX TO URINE CULTURE - Abnormal       Result Value    Specimen UA Urine, Clean Catch      Color, UA Orange (*)     Appearance, UA Cloudy (*)     pH, UA 6.0      Specific Gravity, UA 1.020      Protein, UA 1+ (*)     Glucose, UA Negative      Ketones, UA Negative      Bilirubin (UA)  Negative      Occult Blood UA 3+ (*)     Nitrite, UA Negative      Urobilinogen, UA Negative      Leukocytes, UA 3+ (*)     Narrative:     Specimen Source->Urine   COMPREHENSIVE METABOLIC PANEL - Abnormal    Sodium 135 (*)     Potassium 4.1      Chloride 104      CO2 24      Glucose 100      BUN 15      Creatinine 1.0      Calcium 8.7      Total Protein 6.4      Albumin 3.7      Total Bilirubin 0.4      Alkaline Phosphatase 66      AST 10      ALT 11      eGFR >60.0      Anion Gap 7 (*)     Narrative:     recollect order   URINALYSIS MICROSCOPIC - Abnormal    RBC, UA >100 (*)     WBC, UA >100 (*)     WBC Clumps, UA Many (*)     Bacteria Few (*)     Yeast, UA Few (*)     Squam Epithel, UA 14      Non-Squam Epith 2 (*)     Hyaline Casts, UA 0      Microscopic Comment SEE COMMENT      Narrative:     Specimen Source->Urine   CULTURE, URINE   CBC W/ AUTO DIFFERENTIAL    WBC 8.41      RBC 4.68      Hemoglobin 14.2      Hematocrit 43.0      MCV 92      MCH 30.3      MCHC 33.0      RDW 13.2      Platelets 284      MPV 11.4      Immature Granulocytes 0.4      Gran # (ANC) 4.7      Immature Grans (Abs) 0.03      Lymph # 2.7      Mono # 0.9      Eos # 0.1      Baso # 0.03      nRBC 0      Gran % 56.1      Lymph % 31.6      Mono % 10.3      Eosinophil % 1.2      Basophil % 0.4      Differential Method Automated      Narrative:     Release to patient->Immediate   LIPASE    Lipase 37      Narrative:     Release to patient->Immediate          Imaging Results              CT Abdomen Pelvis  Without Contrast (Final result)  Result time 01/21/25 12:43:57      Final result by Graham Muñoz MD (01/21/25 12:43:57)                   Impression:      Negative for hydronephrosis, urolithiasis, or other acute abnormality.      Electronically signed by: Graham Muñoz  Date:    01/21/2025  Time:    12:43               Narrative:      EXAMINATION:    CT ABDOMEN PELVIS WITHOUT CONTRAST    CLINICAL HISTORY:  UTI, recurrent/complicated  (Female);    TECHNIQUE:  CT abdomen and pelvis without IV or oral contrast. Study is tailored for detection of urinary tract calculi and evaluation of solid organs, hollow viscera, and vascular structures is limited.    COMPARISON:  None    FINDINGS:  CT ABDOMEN:    Coronary artery calcifications partially visualized.  Visualized lung bases are clear.    Surgical clips in gallbladder fossa indicate cholecystectomy.    Noncontrast liver, pancreas, spleen, and adrenals are normal.  Noncontrast kidneys are normal, without hydronephrosis or urolithiasis.    Diverticula arise from the colon.  Large and small intestines otherwise unremarkable.  No free intraperitoneal gas.    Degenerative changes affect the spine.    CT PELVIS:    Tiny focus of gas noted in otherwise unremarkable bladder.  Uterus has been removed.  No adnexal mass or free pelvic fluid.                                       Medications   sodium chloride 0.9% bolus 1,000 mL 1,000 mL (0 mLs Intravenous Stopped 1/21/25 1410)   cefTRIAXone injection 2 g (2 g Intravenous Given 1/21/25 1311)   HYDROmorphone injection 1 mg (1 mg Intravenous Given 1/21/25 1311)     Medical Decision Making  64-year-old female with hematuria and dysuria and presentation consistent with urinary tract infection.  Patient having abdominal pain.  Pyelonephritis suspect and so labs and CT done and broad-spectrum antibiotics started.  Patient does have evidence of urinary tract infection.  No obstructing kidney stones noted.  As feeling well discharged with return precautions and instructions and follow-up    Amount and/or Complexity of Data Reviewed  Labs: ordered. Decision-making details documented in ED Course.  Radiology: ordered. Decision-making details documented in ED Course.    Risk  Prescription drug management.                                      Clinical Impression:  Final diagnoses:  [R31.9] Hematuria, unspecified type (Primary)  [N39.0] Urinary tract infection without  hematuria, site unspecified          ED Disposition Condition    Discharge Stable          ED Prescriptions       Medication Sig Dispense Start Date End Date Auth. Provider    cefUROXime (CEFTIN) 500 MG tablet  (Status: Discontinued) Take 1 tablet (500 mg total) by mouth every 12 (twelve) hours. 20 tablet 1/21/2025 1/21/2025 Apolinar Sanz MD    cefUROXime (CEFTIN) 500 MG tablet Take 1 tablet (500 mg total) by mouth every 12 (twelve) hours. 20 tablet 1/21/2025 -- Apolinar Sanz MD    cefUROXime (CEFTIN) 500 MG tablet Take 1 tablet (500 mg total) by mouth every 12 (twelve) hours. for 2 days 4 tablet 1/21/2025 1/23/2025 Apolinar Sanz MD          Follow-up Information       Follow up With Specialties Details Why Contact Info    Katya Hummel MD Family Medicine In 2 days  [address]  [address]  [facility] [address]  [phone]               Apolinar Sanz MD  01/21/25 5716

## 2025-01-23 ENCOUNTER — NURSE TRIAGE (OUTPATIENT)
Dept: ADMINISTRATIVE | Facility: CLINIC | Age: 65
End: 2025-01-23
Payer: MEDICARE

## 2025-01-23 NOTE — TELEPHONE ENCOUNTER
Spoke with pt states her pharmacy  just called her to say rx was ready. Pt is headed to  now. Advised pt to call if any issues.

## 2025-01-23 NOTE — TELEPHONE ENCOUNTER
Pt calling with c/o needing follow up with provider for UTI pt was seen in the ED and given antibiotics but hasn't been filled due to no pharmacy's open. Pt will try to get it filled today and I will route message as no appt available and pt to call back if worse or starts running fever               Reason for Disposition   Nursing judgment    Protocols used: Information Only Call - No Triage-A-[place]

## 2025-01-24 LAB — BACTERIA UR CULT: NO GROWTH

## 2025-01-31 LAB
LEFT EYE DM RETINOPATHY: NEGATIVE
RIGHT EYE DM RETINOPATHY: NEGATIVE

## 2025-02-02 DIAGNOSIS — M51.369 LUMBAR DEGENERATIVE DISC DISEASE: ICD-10-CM

## 2025-02-02 DIAGNOSIS — M46.96 INFLAMMATORY SPONDYLOPATHY OF LUMBAR REGION: ICD-10-CM

## 2025-02-02 DIAGNOSIS — M54.31 RIGHT SIDED SCIATICA: ICD-10-CM

## 2025-02-02 DIAGNOSIS — E11.42 DIABETIC POLYNEUROPATHY ASSOCIATED WITH TYPE 2 DIABETES MELLITUS: ICD-10-CM

## 2025-02-02 DIAGNOSIS — M25.551 RIGHT HIP PAIN: ICD-10-CM

## 2025-02-03 RX ORDER — GABAPENTIN 100 MG/1
100 CAPSULE ORAL 3 TIMES DAILY PRN
Qty: 90 CAPSULE | Refills: 11 | Status: SHIPPED | OUTPATIENT
Start: 2025-02-03 | End: 2025-02-10 | Stop reason: SDUPTHER

## 2025-02-10 ENCOUNTER — TELEPHONE (OUTPATIENT)
Dept: FAMILY MEDICINE | Facility: CLINIC | Age: 65
End: 2025-02-10

## 2025-02-10 ENCOUNTER — OFFICE VISIT (OUTPATIENT)
Dept: FAMILY MEDICINE | Facility: CLINIC | Age: 65
End: 2025-02-10
Payer: MEDICARE

## 2025-02-10 VITALS
OXYGEN SATURATION: 98 % | HEART RATE: 78 BPM | BODY MASS INDEX: 43.4 KG/M2 | SYSTOLIC BLOOD PRESSURE: 124 MMHG | HEIGHT: 69 IN | WEIGHT: 293 LBS | DIASTOLIC BLOOD PRESSURE: 74 MMHG

## 2025-02-10 DIAGNOSIS — M79.674 PAIN OF RIGHT GREAT TOE: ICD-10-CM

## 2025-02-10 DIAGNOSIS — E11.42 DIABETIC POLYNEUROPATHY ASSOCIATED WITH TYPE 2 DIABETES MELLITUS: Primary | ICD-10-CM

## 2025-02-10 DIAGNOSIS — G47.33 OSA (OBSTRUCTIVE SLEEP APNEA): ICD-10-CM

## 2025-02-10 PROCEDURE — 3008F BODY MASS INDEX DOCD: CPT | Mod: CPTII,S$GLB,,

## 2025-02-10 PROCEDURE — 1101F PT FALLS ASSESS-DOCD LE1/YR: CPT | Mod: CPTII,S$GLB,,

## 2025-02-10 PROCEDURE — 3044F HG A1C LEVEL LT 7.0%: CPT | Mod: CPTII,S$GLB,,

## 2025-02-10 PROCEDURE — 1125F AMNT PAIN NOTED PAIN PRSNT: CPT | Mod: CPTII,S$GLB,,

## 2025-02-10 PROCEDURE — 3074F SYST BP LT 130 MM HG: CPT | Mod: CPTII,S$GLB,,

## 2025-02-10 PROCEDURE — 1159F MED LIST DOCD IN RCRD: CPT | Mod: CPTII,S$GLB,,

## 2025-02-10 PROCEDURE — 1160F RVW MEDS BY RX/DR IN RCRD: CPT | Mod: CPTII,S$GLB,,

## 2025-02-10 PROCEDURE — 99213 OFFICE O/P EST LOW 20 MIN: CPT | Mod: S$GLB,,,

## 2025-02-10 PROCEDURE — 2023F DILAT RTA XM W/O RTNOPTHY: CPT | Mod: CPTII,S$GLB,,

## 2025-02-10 PROCEDURE — 3078F DIAST BP <80 MM HG: CPT | Mod: CPTII,S$GLB,,

## 2025-02-10 PROCEDURE — 3288F FALL RISK ASSESSMENT DOCD: CPT | Mod: CPTII,S$GLB,,

## 2025-02-10 RX ORDER — NAPROXEN 500 MG/1
500 TABLET ORAL 2 TIMES DAILY WITH MEALS
Qty: 10 TABLET | Refills: 0 | Status: SHIPPED | OUTPATIENT
Start: 2025-02-10 | End: 2025-02-15

## 2025-02-10 RX ORDER — CYCLOSPORINE 0.5 MG/ML
1 EMULSION OPHTHALMIC 2 TIMES DAILY
COMMUNITY
Start: 2024-12-18

## 2025-02-10 RX ORDER — GABAPENTIN 100 MG/1
200 CAPSULE ORAL 3 TIMES DAILY PRN
Qty: 90 CAPSULE | Refills: 11 | Status: SHIPPED | OUTPATIENT
Start: 2025-02-10 | End: 2026-02-10

## 2025-02-10 NOTE — TELEPHONE ENCOUNTER
----- Message from Pari sent at 2/10/2025  8:20 AM CST -----  Patient believes she is suffering from blood clots asking to be seen today if possible. Patient asking for a c pap referral as well?  [phone]

## 2025-02-10 NOTE — LETTER
[address]  [place] [address]  Phone: [phone]   [phone]                        MD Samira Melara MD Chequita Williams, MD Matthew Bassett, PA-C Linda Melerine, BRIAN Caceres, BRIAN Llamas, BRIAN      Date: 02/10/2025        Patient: Martha Miguel  YOB: 1960      To whom it may concern:    Please fax over the above patient's most recent eye exam results.       Sincerely,     Lydia Holguin LPN

## 2025-02-10 NOTE — TELEPHONE ENCOUNTER
Visit scheduled.  States she had an order to see dr beltre but he has since retired.  Requesting new pulm referral -DN

## 2025-02-10 NOTE — PATIENT INSTRUCTIONS
And increase in refined sugar intake can lead to further damage to the nerves, increasing the pain, (neuropathy). Decrease this intake and you should see reduction again in the neuropathy.       Why and How To Start an Anti-Inflammatory Diet    From [facility] February 2, 2022 / Nutrition        A little bit of inflammation contributes to healing, but when inflammation becomes chronic, it can trigger disease processes. Chronic inflammation can damage your heart, brain and other organs, and it plays a role in nearly every major illness, including cancer, heart disease, Alzheimers disease and depression.    Just like inflammation happens after an injury, that same process can happen within your body, says registered dietitian Angeline Fuller, RD, LD. Certain foods and health conditions can cause inflammation.    But the food we eat -- and dont eat -- can soothe and even prevent that inflammation. Jonny explains the health benefits of an anti-inflammatory diet, as well as where to start, what to stop eating and how to tell if its working.    Who should try an anti-inflammatory diet?  Everyones inflammatory triggers are different, so there are a few reasons you might experience inflammation. Jonny delves deeper.    Chronic illness  If youre living with a chronic health condition, you may be living with chronic inflammation, too. Conditions associated with inflammation include:    Crohns disease.  Heart disease.  High blood pressure.  Irritable bowel syndrome.  Multiple sclerosis.  Obesity.  Psoriasis.  Rheumatoid arthritis.  Type 1 diabetes.  Ulcerative colitis.  Osteoarthritis  We know of some general associations, Jonny says. Refined starches and processed meats are not good for people with heart disease (or anyone); gluten and dairy can further inflame bowel disorders; and nightshades can be inflammatory for arthritis. But each person needs to find their personal triggers.    Food  sensitivities  Even if you dont have a chronic condition, you can experience inflammation when you eat foods that youre sensitive to.    When you have an immune response to a food, your antibodies rise, which can cause inflammation, Jonny explains. Your body basically sees that food as a foreign body and starts working against it.    But foods that cause inflammation in one person might not cause it in others -- the way, for example, that you can enjoy the occasional slice of pizza without issue, while doing so causes severe inflammation in your friend who has a gluten sensitivity.    Its not a perfect example, though! Keep in mind that although you may not experience a physical sign of inflammation, all processed foods can lead to internal inflammation. So keep them to a minimum, even if you dont have a particular sensitivity.    How do you know if youre experiencing inflammation?  This is a tricky one! You might not even realize youre experiencing this type of hidden inflammation within your body, although some physical signs can clue you in to it.    You could have redness, puffiness, skin rashes, swelling in your hands and feet, she says. You can also experience abdominal distention, when your pants feel tight around your waist.    Other clues can include fatigue, weight gain, achy joints and muscles, headaches and gastrointestinal issues. You may also be more prone to getting colds and the flu, and when you do get them, they may linger for weeks.    How to start an anti-inflammatory diet  The foods you eat (and the ones you avoid) can help soothe and even prevent inflammation by quashing your bodys inflammatory responses. But because everyones inflammatory triggers are different, theres no one-size-fits-all anti-inflammatory diet.    The term anti-inflammatory diet doesnt refer to a specific diet regimen but to an overall style of eating, Jonny says. There are, however, some guidelines  to follow to eat in a way that reduces the likelihood of inflammation.    1. Scale way back on processed foods  The first key to minimizing inflammation is cutting foods that cause it. An anti-inflammatory diet is one that includes minimally processed foods, Jonny says. That typically means staying away from anything that comes in a box or a bag, or anything that has a laundry list of ingredients -- especially if they start with sugar, salt or a processed oil and include ingredients you dont recognize.    Examples include:    Sweets, like commercial baked goods, pre-packaged desserts, ice cream and candy.  Snack foods, like potato chips and microwave popcorn.  Processed meats, including cano, sausage, hot dogs, bologna, pepperoni and salami.  Processed cheeses, like keo cheese dip and American cheese slices.  Sugary beverages, including soda and sports drinks.  Fried foods, like fried chicken and French fries.  Even so-called healthy snacks like granola bars, trail mix and baked chips can have a lot of processed ingredients, including added sodium and sugar.    Theres no real nutritional benefit to them, so youre not lacking anything when you cut them out, Jonny says.    2. Focus on whole foods  When youve cut out the processed stuff, whats left? Whole foods, of course!    A whole food is a one-ingredient food, an entire entity: an apple, an orange, a cucumber, Jonny says. In addition to fruits and vegetables, other examples include:    Brown or wild rice.  Chicken/turkey breast.  Eggs.  Fish (especially oily fish, such as salmon, tuna, herring or mackerel).  Legumes, like dried beans and peas.  Nuts and seeds.  Oats.  In short, if you can find it in nature, its probably a whole food.    So do any processed foods make the grade? Foods that have more than one ingredient can still be made up of whole foods -- for example, store-bought hummus, dried fruit and nut snack mix or a pasta sauce,  Jonny says. The muro is to always review the ingredient list.    That means choosing breads and pastas that are minimally processed, minimally preserved and made with whole grains.    3. Try a diet proven to reduce inflammation  Again, theres no one-size-fits-all anti-inflammatory diet. But two styles of eating have been shown to help: the Mediterranean Diet and the DASH Diet.    Positive research reports that these diets are successful in reducing inflammation, as well as cholesterol, weight, blood pressure and blood sugar, Jonny says. She explains each of them.     The Mediterranean Diet  Thought to be the heart-healthiest of diets, the Mediterranean Diet is a style of eating popular among people who live along the [place]. A foundation of this diet is fish high in omega-3 fatty acid, which has been proven to reduce inflammation.    The Mediterranean Diet has been shown to be anti-inflammatory because of its focus on whole foods and omega-3 fatty acids, Jonny says. It also eliminates processed oils, like cottonseed and soybean oil, which are found in many ultra-processed foods.    The DASH Diet  DASH, which stands for Dietary Approaches to Stop Hypertension, is a diet designed to reduce high blood pressure. This diet has been shown to reduce inflammation, probably because it reduces blood pressure and promotes weight loss, Jonny notes. Remember, both high blood pressure and obesity are associated with inflammation.    Like the Mediterranean Diet, the DASH Diet focuses on whole foods and limits protein, sweets and processed foods. But DASH includes a bit more dairy, and it doesnt specifically encourage fish or extra-virgin olive oil.    Is vegetarianism anti-inflammatory?  Though meat can be inflammatory, keep in mind that cutting animal products doesnt necessarily mean eating healthfully.    Eating a plant-based diet can help suppress inflammation, Jonny says, but vegetarian,  pescetarian and even vegan diets can still include foods like potato chips, fries and cookies. To see anti-inflammatory benefits, you have to stick to whole foods.    4. If needed, try an elimination diet  If youve cut out processed foods but still experience symptoms of inflammation, you may need to go a step further.    Finding the right anti-inflammatory diet for you is a matter of personalization and finding the foods that trigger your inflammation, Jonny explains. The best way to start is by trying an elimination diet and slowly cutting out potential trigger foods one by one.    Food sensitivity tests can help identify which foods increase your bodys antibody response, too, which may be helpful if you cant seem to determine a culprit on your own.    Do anti-inflammatory diets work?  The biggest hint that your anti-inflammatory diet is working is if you start feeling better, so keep an eye on your symptoms and look for positive changes to your health.    There are a lot of different ways your body can react to an anti-inflammatory diet, Jonny says. They include:    Clearer skin.  Decreased muscle or joint pain.  Decreased swelling in your hands and feet.  Fewer headaches.  Improved gastrointestinal symptoms (diarrhea, gas, nausea, stomach pain).  Improved sleep.  Less anxiety, stress and/or brain fog.  Less bloating.  Lower blood pressure.  Lower blood sugar.  More energy.  Weight loss.  How long does it take to see results?  Starting an anti-inflammatory diet isnt a magic pill. Your results will vary based on the severity of your intolerance and inflammation.    Drastic changes never lead to long-term success, so give yourself three to six months to make diet changes and to begin to see results, Jonny advises. Begin by making small changes that you know will be impactful, and then slowly continue to add on.    In some cases, if you significantly react to a certain food, you may see results as  soon as two to three weeks after eliminating that food from your diet.    This can be very encouraging and motivating, Jonny says. Patients often tell me that they never realized how bad they felt until they changed their diet and began to feel so much better.

## 2025-02-11 LAB
HBA1C MFR BLD: 6.1 % OF TOTAL HGB
URATE SERPL-MCNC: 5.4 MG/DL (ref 2.5–7)

## 2025-02-14 ENCOUNTER — PATIENT OUTREACH (OUTPATIENT)
Dept: ADMINISTRATIVE | Facility: HOSPITAL | Age: 65
End: 2025-02-14
Payer: MEDICARE

## 2025-02-23 NOTE — PROGRESS NOTES
SUBJECTIVE:    Patient ID: Martha Miguel is a 64 y.o. female.    Chief Complaint: Toe Pain (No bottles//Pt here for right great toe pain that started and was the worst on Friday. Pt has been taking aspirin and using compression stockings. Denies injury. Pain radiates up her leg//had eye exam with Dr. Lucas-Requested//JL)    Toe Pain     History of Present Illness    CHIEF COMPLAINT:  Martha presents today with foot pain and swelling.    FOOT PAIN:  She reports pain and hardness in foot with proximal radiation and significant tenderness to touch. Pain is exacerbated with foot elevation. She notes improvement with compression stockings. Prior to current symptoms, she experienced intermittent foot cramping requiring movement for relief.    RECENT HOSPITALIZATION:  She was hospitalized on January 21st for severe UTI, receiving two doses of IV antibiotics and medication via syringe along with oral hydration. She completed the prescribed oral antibiotic course as directed post-discharge.    DIABETES:  Her last A1C was 6.2. She reports recent increased consumption of refined sugars, including candies and cookies, with difficulty controlling sugar cravings. She continues Ozempic 2 mg.    SLEEP APNEA:  She uses CPAP for sleep apnea management and recently acquired a new device. She needs to schedule follow-up with sleep specialist within 90 days of receiving the new equipment and obtain supplies for her CPAP machine.    GI CONCERNS:  She has a history of bowel problems with constipation. She denies history of ulcers.      ROS:  General: -fever, -chills, -fatigue, -weight gain, -weight loss  Eyes: -vision changes, -redness, -discharge  ENT: -ear pain, -nasal congestion, -sore throat  Cardiovascular: -chest pain, -palpitations, -lower extremity edema  Respiratory: -cough, -shortness of breath  Gastrointestinal: -abdominal pain, -nausea, -vomiting, -diarrhea, +constipation, -blood in stool  Genitourinary: -dysuria, -hematuria,  -frequency  Musculoskeletal: -joint pain, -muscle pain, +limb swelling, +muscle cramps  Skin: -rash, -lesion  Neurological: -headache, -dizziness, -numbness, -tingling  Psychiatric: -anxiety, -depression, -sleep difficulty         Office Visit on 02/10/2025   Component Date Value Ref Range Status    Uric Acid 02/10/2025 5.4  2.5 - 7.0 mg/dL Final    Hemoglobin A1C 02/10/2025 6.1 (H)  <5.7 % of total Hgb Final   Admission on 01/21/2025, Discharged on 01/21/2025   Component Date Value Ref Range Status    WBC 01/21/2025 8.41  3.90 - 12.70 K/uL Final    RBC 01/21/2025 4.68  4.00 - 5.40 M/uL Final    Hemoglobin 01/21/2025 14.2  12.0 - 16.0 g/dL Final    Hematocrit 01/21/2025 43.0  37.0 - 48.5 % Final    MCV 01/21/2025 92  82 - 98 fL Final    MCH 01/21/2025 30.3  27.0 - 31.0 pg Final    MCHC 01/21/2025 33.0  32.0 - 36.0 g/dL Final    RDW 01/21/2025 13.2  11.5 - 14.5 % Final    Platelets 01/21/2025 284  150 - 450 K/uL Final    MPV 01/21/2025 11.4  9.2 - 12.9 fL Final    Immature Granulocytes 01/21/2025 0.4  0.0 - 0.5 % Final    Gran # (ANC) 01/21/2025 4.7  1.8 - 7.7 K/uL Final    Immature Grans (Abs) 01/21/2025 0.03  0.00 - 0.04 K/uL Final    Lymph # 01/21/2025 2.7  1.0 - 4.8 K/uL Final    Mono # 01/21/2025 0.9  0.3 - 1.0 K/uL Final    Eos # 01/21/2025 0.1  0.0 - 0.5 K/uL Final    Baso # 01/21/2025 0.03  0.00 - 0.20 K/uL Final    nRBC 01/21/2025 0  0 /100 WBC Final    Gran % 01/21/2025 56.1  38.0 - 73.0 % Final    Lymph % 01/21/2025 31.6  18.0 - 48.0 % Final    Mono % 01/21/2025 10.3  4.0 - 15.0 % Final    Eosinophil % 01/21/2025 1.2  0.0 - 8.0 % Final    Basophil % 01/21/2025 0.4  0.0 - 1.9 % Final    Differential Method 01/21/2025 Automated   Final    Lipase 01/21/2025 37  4 - 60 U/L Final    Specimen UA 01/21/2025 Urine, Clean Catch   Final    Color, UA 01/21/2025 Orange (A)  Yellow, Straw, Aisha Final    Appearance, UA 01/21/2025 Cloudy (A)  Clear Final    pH, UA 01/21/2025 6.0  5.0 - 8.0  Final    Specific Gravity, UA 01/21/2025 1.020  1.005 - 1.030 Final    Protein, UA 01/21/2025 1+ (A)  Negative Final    Glucose, UA 01/21/2025 Negative  Negative Final    Ketones, UA 01/21/2025 Negative  Negative Final    Bilirubin (UA) 01/21/2025 Negative  Negative Final    Occult Blood UA 01/21/2025 3+ (A)  Negative Final    Nitrite, UA 01/21/2025 Negative  Negative Final    Urobilinogen, UA 01/21/2025 Negative  Negative EU/dL Final    Leukocytes, UA 01/21/2025 3+ (A)  Negative Final    Sodium 01/21/2025 135 (L)  136 - 145 mmol/L Final    Potassium 01/21/2025 4.1  3.5 - 5.1 mmol/L Final    Chloride 01/21/2025 104  95 - 110 mmol/L Final    CO2 01/21/2025 24  23 - 29 mmol/L Final    Glucose 01/21/2025 100  70 - 110 mg/dL Final    BUN 01/21/2025 15  8 - 23 mg/dL Final    Creatinine 01/21/2025 1.0  0.5 - 1.4 mg/dL Final    Calcium 01/21/2025 8.7  8.7 - 10.5 mg/dL Final    Total Protein 01/21/2025 6.4  6.0 - 8.4 g/dL Final    Albumin 01/21/2025 3.7  3.5 - 5.2 g/dL Final    Total Bilirubin 01/21/2025 0.4  0.1 - 1.0 mg/dL Final    Alkaline Phosphatase 01/21/2025 66  55 - 135 U/L Final    AST 01/21/2025 10  10 - 40 U/L Final    ALT 01/21/2025 11  10 - 44 U/L Final    eGFR 01/21/2025 >60.0  >60 mL/min/1.73 m^2 Final    Anion Gap 01/21/2025 7 (L)  8 - 16 mmol/L Final    RBC, UA 01/21/2025 >100 (H)  0 - 4 /hpf Final    WBC, UA 01/21/2025 >100 (H)  0 - 5 /hpf Final    WBC Clumps, UA 01/21/2025 Many (A)  None-Rare Final    Bacteria 01/21/2025 Few (A)  None-Occ /hpf Final    Yeast, UA 01/21/2025 Few (A)  None Final    Squam Epithel, UA 01/21/2025 14  /hpf Final    Non-Squam Epith 01/21/2025 2 (A)  <1/hpf /hpf Final    Hyaline Casts, UA 01/21/2025 0  0-1/lpf /lpf Final    Microscopic Comment 01/21/2025 SEE COMMENT   Final    Urine Culture, Routine 01/21/2025 No growth   Final   Refill on 08/14/2024   Component Date Value Ref Range Status    Cholesterol 08/20/2024 157  <200 mg/dL Final    HDL  08/20/2024 52  > OR = 50 mg/dL Final    Triglycerides 08/20/2024 97  <150 mg/dL Final    LDL Cholesterol 08/20/2024 86  mg/dL (calc) Final    HDL/Cholesterol Ratio 08/20/2024 3.0  <5.0 (calc) Final    Non HDL Chol. (LDL+VLDL) 08/20/2024 105  <130 mg/dL (calc) Final    Glucose 08/20/2024 85  65 - 99 mg/dL Final    BUN 08/20/2024 10  7 - 25 mg/dL Final    Creatinine 08/20/2024 0.89  0.50 - 1.05 mg/dL Final    eGFR 08/20/2024 72  > OR = 60 mL/min/1.73m2 Final    BUN/Creatinine Ratio 08/20/2024 SEE NOTE:  6 - 22 (calc) Final    Sodium 08/20/2024 141  135 - 146 mmol/L Final    Potassium 08/20/2024 4.7  3.5 - 5.3 mmol/L Final    Chloride 08/20/2024 104  98 - 110 mmol/L Final    CO2 08/20/2024 28  20 - 32 mmol/L Final    Calcium 08/20/2024 9.8  8.6 - 10.4 mg/dL Final    Total Protein 08/20/2024 7.1  6.1 - 8.1 g/dL Final    Albumin 08/20/2024 4.2  3.6 - 5.1 g/dL Final    Globulin, Total 08/20/2024 2.9  1.9 - 3.7 g/dL (calc) Final    Albumin/Globulin Ratio 08/20/2024 1.4  1.0 - 2.5 (calc) Final    Total Bilirubin 08/20/2024 0.6  0.2 - 1.2 mg/dL Final    Alkaline Phosphatase 08/20/2024 83  37 - 153 U/L Final    AST 08/20/2024 12  10 - 35 U/L Final    ALT 08/20/2024 11  6 - 29 U/L Final    Hemoglobin A1C 08/20/2024 6.2 (H)  <5.7 % of total Hgb Final    Creatinine, Urine 08/20/2024 261  20 - 275 mg/dL Final    Microalb, Ur 08/20/2024 0.5  See Note: mg/dL Final    Microalb/Creat Ratio 08/20/2024 2  <30 mg/g creat Final       Past Medical History:   Diagnosis Date    Abnormal heart rhythm     Allergy     Aneurysm of abdominal vessel 2020    Arthritis     COPD (chronic obstructive pulmonary disease)     Coronary artery disease     Depression     Diabetes mellitus, type 2     Essential hypertension 08/16/2016    Pure hypercholesterolemia 8/25/2023    Right sided sciatica 04/23/2018     Social History[1]  Past Surgical History:   Procedure Laterality Date    CHOLECYSTECTOMY      FRACTURE  "SURGERY      HYSTERECTOMY      INJECTION OF ANESTHETIC AGENT AROUND MEDIAL BRANCH NERVES INNERVATING LUMBAR FACET JOINT Bilateral 7/5/2018    Procedure: Block-nerve-medial branch-lumbar;  Surgeon: Arthur Orellana MD;  Location: [facility] OR;  Service: Pain Management;  Laterality: Bilateral;  L2, 3, 4, 5    TONSILLECTOMY       Family History   Family history unknown: Yes       The 10-year CVD risk score (MICHELE'Agostino, et al., 2008) is: 14.8%    Values used to calculate the score:      Age: 64 years      Sex: Female      Diabetic: Yes      Tobacco smoker: No      Systolic Blood Pressure: 124 mmHg      Is BP treated: Yes      HDL Cholesterol: 52 mg/dL      Total Cholesterol: 157 mg/dL    All of your core healthy metrics are met.      Review of patient's allergies indicates:   Allergen Reactions    Phenytoin Hives    Dilantin [phenytoin sodium extended] Rash    Lovenox [enoxaparin] Rash and Hives     Current Medications[2]    Review of Systems   Constitutional:  Negative for activity change and unexpected weight change.   HENT:  Negative for hearing loss, rhinorrhea and trouble swallowing.    Eyes:  Negative for discharge and visual disturbance.   Respiratory:  Negative for chest tightness and wheezing.    Cardiovascular:  Negative for chest pain and palpitations.   Gastrointestinal:  Negative for blood in stool, constipation, diarrhea and vomiting.   Endocrine: Negative for polydipsia and polyuria.   Genitourinary:  Negative for difficulty urinating, dysuria, hematuria and menstrual problem.   Musculoskeletal:  Negative for arthralgias, joint swelling and neck pain.   Neurological:  Negative for weakness and headaches.   Psychiatric/Behavioral:  Negative for confusion and dysphoric mood.            Objective:      Vitals:    02/10/25 0958   BP: 124/74   Pulse: 78   SpO2: 98%   Weight: (!) 138.8 kg (306 lb)   Height: 5' 9" (1.753 m)     Physical Exam  Vitals and nursing note reviewed.   Constitutional:       General: She is " not in acute distress.     Appearance: Normal appearance. She is well-developed. She is obese.   HENT:      Head: Normocephalic and atraumatic.   Eyes:      General: No scleral icterus.  Neck:      Thyroid: No thyromegaly.   Cardiovascular:      Rate and Rhythm: Normal rate.      Pulses:           Dorsalis pedis pulses are 2+ on the right side and 2+ on the left side.   Pulmonary:      Effort: Pulmonary effort is normal.   Musculoskeletal:      Lumbar back: Normal. No spasms.      Right lower leg: No edema.      Left lower leg: No edema.   Feet:      Right foot:      Protective Sensation: 4 sites tested.  4 sites sensed.      Skin integrity: Skin integrity normal.      Toenail Condition: Right toenails are normal.      Left foot:      Protective Sensation: 4 sites tested.  4 sites sensed.      Skin integrity: Skin integrity normal.      Toenail Condition: Left toenails are normal.   Skin:     General: Skin is warm and dry.      Capillary Refill: Capillary refill takes less than 2 seconds.      Coloration: Skin is not pale.   Neurological:      General: No focal deficit present.      Mental Status: She is alert. Mental status is at baseline.     Physical Exam              Assessment:       1. Diabetic polyneuropathy associated with type 2 diabetes mellitus    2. ZABRINA (obstructive sleep apnea)    3. Pain of right great toe         Plan:       Diabetic polyneuropathy associated with type 2 diabetes mellitus  -     gabapentin (NEURONTIN) 100 MG capsule; Take 2 capsules (200 mg total) by mouth 3 (three) times daily as needed (neuropathy and sciatic pain).  Dispense: 90 capsule; Refill: 11  -     Hemoglobin A1C; Future; Expected date: 02/10/2025    ZABRINA (obstructive sleep apnea)  -     Ambulatory referral/consult to Pulmonology; Future; Expected date: 02/17/2025    Pain of right great toe  -     URIC ACID; Future; Expected date: 02/10/2025  -     naproxen (NAPROSYN) 500 MG tablet; Take 1 tablet (500 mg total) by mouth 2  (two) times daily with meals. for 5 days  Dispense: 10 tablet; Refill: 0      Assessment & Plan    IMPRESSION:  - Assessed foot pain, considering possible gout attack vs. neuropathy exacerbation  - Evaluated recent weight loss and dietary changes  - Reviewed recent UTI treatment and antibiotic course  - Considered adjustment to gabapentin dosage for neuropathy management  - Assessed current diabetes management, noting good A1C control (6.2)  - Evaluated effectiveness of current Ozempic dosage for appetite and weight control  - Considered potential switch from Ozempic to Mounjaro for improved appetite and weight management    CHRONIC OBSTRUCTIVE PULMONARY DISEASE WITH (ACUTE) EXACERBATION:  - Martha uses a CPAP machine.  - Will submit a referral for a CPAP doctor for supplies and follow-up.    NEUROPATHY:  - Performed physical exam, noting significant neuropathy with reduced sensation in the affected area.  - Assessed that the recent increase in sugar intake may have worsened neuropathy symptoms.  - Explained the connection between refined sugar intake and neuropathy symptoms.  - Increased gabapentin dosage from 100 mg to 200 mg to manage symptoms.  - Advised the patient to return to a healthy, clean diet without refined sugars to improve neuropathy.    DIABETES:  - Continued Ozempic 2 mg dose.  - Ordered A1C blood test.  - Noted that the patient's last A1C was 6.2, which is considered good control.  - However, patient reports recent increased sugar intake and difficulty controlling cravings.  - Educated the patient on the mechanism of Mounjaro working on 2 hormones versus 1 in Ozempic.  - Considered changing from Ozempic to Mounjaro if current medication is not controlling appetite and cravings effectively.  - Advised patient to return to a healthy, clean diet without refined sugars.  - Suggested and encouraged continued use of protein shakes to help with satiety and manage appetite.    URINARY TRACT INFECTION:  -  Noted that the patient reports recent hospitalization for a severe urinary tract infection.  - Confirmed that the patient received antibiotic treatment in the hospital and was prescribed additional antibiotics to take at home.    SLEEP APNEA:  - Noted that the patient mentions having a new CPAP machine.    CONSTIPATION:  - Noted that the patient reports having problems with bowels and constipation.  - Acknowledged that the patient is taking medication for constipation but not consistently due to concerns about urgency when leaving home.  - Emphasized the importance of maintaining a consistent bowel regimen to prevent constipation.    HYPERLIPIDEMIA:  - Noted that the patient's cholesterol was checked in August.    OTHER INSTRUCTIONS:  - Discussed benefits of natural sugar replacements over artificial sweeteners.  - Recommend increasing water intake to combat dry mouth.  - Started naproxen for a few days as an anti-inflammatory.  - Ordered uric acid level blood test.    FOLLOW UP:  - Follow up with Dr. Hummel as scheduled.  - Contact the office if symptoms worsen or new concerns arise.         No follow-ups on file.        This note was generated with the assistance of ambient listening technology. Verbal consent was obtained by the patient and accompanying visitor(s) for the recording of patient appointment to facilitate this note. I attest to having reviewed and edited the generated note for accuracy, though some syntax or spelling errors may persist. Please contact the author of this note for any clarification.      2/23/2025 Milly Llamas           [1]  Social History  Socioeconomic History    Marital status:    Tobacco Use    Smoking status: Never    Smokeless tobacco: Never   Substance and Sexual Activity    Alcohol use: No    Drug use: No    Sexual activity: Never     Social Drivers of Health     Financial Resource Strain: Low Risk  (2/29/2024)    Overall Financial Resource Strain (CARDI)      Difficulty of Paying Living Expenses: Not very hard   Food Insecurity: No Food Insecurity (2/29/2024)    Hunger Vital Sign     Worried About Running Out of Food in the Last Year: Never true     Ran Out of Food in the Last Year: Never true   Transportation Needs: No Transportation Needs (2/29/2024)    PRAPARE - Transportation     Lack of Transportation (Medical): No     Lack of Transportation (Non-Medical): No   Physical Activity: Unknown (2/29/2024)    Exercise Vital Sign     Days of Exercise per Week: 3 days   Stress: No Stress Concern Present (2/29/2024)    [employer] - Occupational Stress Questionnaire     Feeling of Stress : Only a little   Housing Stability: Low Risk  (2/29/2024)    Housing Stability Vital Sign     Unable to Pay for Housing in the Last Year: No     Number of Places Lived in the Last Year: 1     Unstable Housing in the Last Year: No   [2]    Current Outpatient Medications:     aspirin (ECOTRIN) 81 MG EC tablet, Take 81 mg by mouth once daily., Disp: , Rfl:     atorvastatin (LIPITOR) 20 MG tablet, Take 1 tablet (20 mg total) by mouth once daily., Disp: 90 tablet, Rfl: 3    blood sugar diagnostic Strp, To check BG 1 times daily, to use with insurance preferred meter, Disp: 200 each, Rfl: 5    blood-glucose meter kit, Use as instructed, Disp: 1 each, Rfl: 0    carvediloL (COREG) 3.125 MG tablet, Take 3.125 mg by mouth 2 (two) times daily with meals., Disp: , Rfl:     lancets 32 gauge Misc, Test glucose twice daily, Disp: 200 each, Rfl: 3    RESTASIS 0.05 % ophthalmic emulsion, Place 1 drop into both eyes 2 (two) times daily., Disp: , Rfl:     semaglutide (OZEMPIC) 2 mg/dose (8 mg/3 mL) PnIj, Inject 2 mg into the skin every 7 days., Disp: 3 mL, Rfl: 11    valsartan (DIOVAN) 40 MG tablet, Take 1 tablet (40 mg total) by mouth once daily., Disp: 90 tablet, Rfl: 3    gabapentin (NEURONTIN) 100 MG capsule, Take 2 capsules (200 mg total) by mouth 3  (three) times daily as needed (neuropathy and sciatic pain)., Disp: 90 capsule, Rfl: 11

## 2025-02-26 ENCOUNTER — PATIENT MESSAGE (OUTPATIENT)
Dept: PULMONOLOGY | Facility: CLINIC | Age: 65
End: 2025-02-26
Payer: MEDICARE

## 2025-02-27 ENCOUNTER — OFFICE VISIT (OUTPATIENT)
Dept: PULMONOLOGY | Facility: CLINIC | Age: 65
End: 2025-02-27
Payer: MEDICARE

## 2025-02-27 VITALS
WEIGHT: 293 LBS | HEIGHT: 69 IN | BODY MASS INDEX: 43.4 KG/M2 | DIASTOLIC BLOOD PRESSURE: 80 MMHG | HEART RATE: 67 BPM | SYSTOLIC BLOOD PRESSURE: 134 MMHG | OXYGEN SATURATION: 95 %

## 2025-02-27 DIAGNOSIS — G47.33 OSA (OBSTRUCTIVE SLEEP APNEA): ICD-10-CM

## 2025-02-27 PROCEDURE — 4010F ACE/ARB THERAPY RXD/TAKEN: CPT | Mod: CPTII,S$GLB,, | Performed by: NURSE PRACTITIONER

## 2025-02-27 PROCEDURE — 1126F AMNT PAIN NOTED NONE PRSNT: CPT | Mod: CPTII,S$GLB,, | Performed by: NURSE PRACTITIONER

## 2025-02-27 PROCEDURE — 3008F BODY MASS INDEX DOCD: CPT | Mod: CPTII,S$GLB,, | Performed by: NURSE PRACTITIONER

## 2025-02-27 PROCEDURE — 3079F DIAST BP 80-89 MM HG: CPT | Mod: CPTII,S$GLB,, | Performed by: NURSE PRACTITIONER

## 2025-02-27 PROCEDURE — 3075F SYST BP GE 130 - 139MM HG: CPT | Mod: CPTII,S$GLB,, | Performed by: NURSE PRACTITIONER

## 2025-02-27 PROCEDURE — 99203 OFFICE O/P NEW LOW 30 MIN: CPT | Mod: S$GLB,,, | Performed by: NURSE PRACTITIONER

## 2025-02-27 PROCEDURE — 99999 PR PBB SHADOW E&M-EST. PATIENT-LVL III: CPT | Mod: PBBFAC,,, | Performed by: NURSE PRACTITIONER

## 2025-02-27 PROCEDURE — 3044F HG A1C LEVEL LT 7.0%: CPT | Mod: CPTII,S$GLB,, | Performed by: NURSE PRACTITIONER

## 2025-02-27 PROCEDURE — 1159F MED LIST DOCD IN RCRD: CPT | Mod: CPTII,S$GLB,, | Performed by: NURSE PRACTITIONER

## 2025-02-27 NOTE — PROGRESS NOTES
SUBJECTIVE:    Patient ID: Martha Miguel is a 64 y.o. female.    Chief Complaint: Establish Care and Sleep Apnea    HPI  Patient here today to establish care for ZABRINA. She was a patient of Dr. Hernandez. She is using her CPAP every night and does feel great benefit from it.She wakes up with more energy and feels awake.  Her compliance report shows she is 87% compliant sleeps an average of 6 hours and 22 minutes with an AHI of 1.3.  She needs new supplies.   Past Medical History:   Diagnosis Date    Abnormal heart rhythm     Allergy     Aneurysm of abdominal vessel 2020    Arthritis     Coronary artery disease     Depression     Diabetes mellitus, type 2     Essential hypertension 08/16/2016    Pure hypercholesterolemia 08/25/2023    Right sided sciatica 04/23/2018     Past Surgical History:   Procedure Laterality Date    CHOLECYSTECTOMY      FRACTURE SURGERY      HYSTERECTOMY      INJECTION OF ANESTHETIC AGENT AROUND MEDIAL BRANCH NERVES INNERVATING LUMBAR FACET JOINT Bilateral 7/5/2018    Procedure: Block-nerve-medial branch-lumbar;  Surgeon: Arthur Orellana MD;  Location: [facility] OR;  Service: Pain Management;  Laterality: Bilateral;  L2, 3, 4, 5    TONSILLECTOMY       Family History   Family history unknown: Yes        Social History:   Marital Status:   Occupation: Data Unavailable  Alcohol History:  reports no history of alcohol use.  Tobacco History:  reports that she has never smoked. She has never used smokeless tobacco.  Drug History:  reports no history of drug use.    Review of patient's allergies indicates:   Allergen Reactions    Phenytoin Hives    Dilantin [phenytoin sodium extended] Rash    Lovenox [enoxaparin] Rash and Hives       Current Medications[1]        Review of Systems  General: Feeling Well.  Eyes: Vision is good.  ENT:  No sinusitis or pharyngitis.   Heart:: No chest pain or palpitations.  Lungs: No cough, sputum, or wheezing.  GI: No Nausea, vomiting, constipation, diarrhea, or  "reflux.  : No dysuria, hesitancy, or nocturia.  Musculoskeletal: No joint pain or myalgias.  Skin: No lesions or rashes.  Neuro: No headaches or neuropathy.  Lymph: No edema or adenopathy.  Psych: No anxiety or depression.  Endo: No weight change.    OBJECTIVE:      /80 (BP Location: Right arm, Patient Position: Sitting)   Pulse 67   Ht 5' 9" (1.753 m)   Wt (!) 140.3 kg (309 lb 6.4 oz)   SpO2 95%   BMI 45.69 kg/m²     Physical Exam  GENERAL: Older patient in no distress.  HEENT: Pupils equal and reactive. Extraocular movements intact. Nose intact.      Pharynx moist.  NECK: Supple.   HEART: Regular rate and rhythm. No murmur or gallop auscultated.  LUNGS: Clear to auscultation and percussion. Lung excursion symmetrical. No change in fremitus. No adventitial noises.  ABDOMEN: Bowel sounds present. Non-tender, no masses palpated.  EXTREMITIES: Normal muscle tone and joint movement, no cyanosis or clubbing.   LYMPHATICS: No adenopathy palpated, no edema.  SKIN: Dry, intact, no lesions.   NEURO: Cranial nerves II-XII intact. Motor strength 5/5 bilaterally, upper and lower extremities.  PSYCH: Appropriate affect.    Assessment:       1. ZABRINA (obstructive sleep apnea)          Plan:          CPAP supplies  full face mask     Follow up in about 1 year (around 2/27/2026).               [1]   Current Outpatient Medications   Medication Sig Dispense Refill    aspirin (ECOTRIN) 81 MG EC tablet Take 81 mg by mouth once daily.      atorvastatin (LIPITOR) 20 MG tablet Take 1 tablet (20 mg total) by mouth once daily. 90 tablet 3    blood sugar diagnostic Strp To check BG 1 times daily, to use with insurance preferred meter 200 each 5    carvediloL (COREG) 3.125 MG tablet Take 3.125 mg by mouth 2 (two) times daily with meals.      gabapentin (NEURONTIN) 100 MG capsule Take 2 capsules (200 mg total) by mouth 3 (three) times daily as needed (neuropathy and sciatic pain). 90 capsule 11    lancets 32 gauge Misc Test glucose " twice daily 200 each 3    RESTASIS 0.05 % ophthalmic emulsion Place 1 drop into both eyes 2 (two) times daily.      semaglutide (OZEMPIC) 2 mg/dose (8 mg/3 mL) PnIj Inject 2 mg into the skin every 7 days. 3 mL 11    valsartan (DIOVAN) 40 MG tablet Take 1 tablet (40 mg total) by mouth once daily. 90 tablet 3    blood-glucose meter kit Use as instructed 1 each 0     No current facility-administered medications for this visit.

## 2025-03-26 ENCOUNTER — OFFICE VISIT (OUTPATIENT)
Dept: FAMILY MEDICINE | Facility: CLINIC | Age: 65
End: 2025-03-26
Payer: MEDICARE

## 2025-03-26 VITALS
WEIGHT: 293 LBS | HEART RATE: 97 BPM | HEIGHT: 69 IN | SYSTOLIC BLOOD PRESSURE: 122 MMHG | BODY MASS INDEX: 43.4 KG/M2 | OXYGEN SATURATION: 99 % | DIASTOLIC BLOOD PRESSURE: 64 MMHG

## 2025-03-26 DIAGNOSIS — I10 ESSENTIAL HYPERTENSION: ICD-10-CM

## 2025-03-26 DIAGNOSIS — E78.00 PURE HYPERCHOLESTEROLEMIA: ICD-10-CM

## 2025-03-26 DIAGNOSIS — E11.9 TYPE 2 DIABETES MELLITUS WITHOUT COMPLICATION, WITHOUT LONG-TERM CURRENT USE OF INSULIN: Primary | ICD-10-CM

## 2025-03-26 DIAGNOSIS — M79.671 RIGHT FOOT PAIN: ICD-10-CM

## 2025-03-26 DIAGNOSIS — E66.01 MORBID OBESITY: ICD-10-CM

## 2025-03-26 DIAGNOSIS — K59.09 CHRONIC CONSTIPATION: ICD-10-CM

## 2025-03-26 PROCEDURE — 3078F DIAST BP <80 MM HG: CPT | Mod: CPTII,S$GLB,, | Performed by: FAMILY MEDICINE

## 2025-03-26 PROCEDURE — 3008F BODY MASS INDEX DOCD: CPT | Mod: CPTII,S$GLB,, | Performed by: FAMILY MEDICINE

## 2025-03-26 PROCEDURE — 99214 OFFICE O/P EST MOD 30 MIN: CPT | Mod: S$GLB,,, | Performed by: FAMILY MEDICINE

## 2025-03-26 PROCEDURE — 4010F ACE/ARB THERAPY RXD/TAKEN: CPT | Mod: CPTII,S$GLB,, | Performed by: FAMILY MEDICINE

## 2025-03-26 PROCEDURE — 3044F HG A1C LEVEL LT 7.0%: CPT | Mod: CPTII,S$GLB,, | Performed by: FAMILY MEDICINE

## 2025-03-26 PROCEDURE — 3074F SYST BP LT 130 MM HG: CPT | Mod: CPTII,S$GLB,, | Performed by: FAMILY MEDICINE

## 2025-03-26 PROCEDURE — 1160F RVW MEDS BY RX/DR IN RCRD: CPT | Mod: CPTII,S$GLB,, | Performed by: FAMILY MEDICINE

## 2025-03-26 PROCEDURE — 1159F MED LIST DOCD IN RCRD: CPT | Mod: CPTII,S$GLB,, | Performed by: FAMILY MEDICINE

## 2025-03-26 PROCEDURE — 2023F DILAT RTA XM W/O RTNOPTHY: CPT | Mod: CPTII,S$GLB,, | Performed by: FAMILY MEDICINE

## 2025-03-26 RX ORDER — ATORVASTATIN CALCIUM 20 MG/1
20 TABLET, FILM COATED ORAL DAILY
Qty: 90 TABLET | Refills: 3 | Status: SHIPPED | OUTPATIENT
Start: 2025-03-26 | End: 2026-03-26

## 2025-03-26 NOTE — PROGRESS NOTES
SUBJECTIVE:    Patient ID: Martha Miguel is a 64 y.o. female.    Chief Complaint: 5M DM Check Up  Lab Review and R Foot Pain    History of Present Illness    CHIEF COMPLAINT:  Patient presents today for six-month follow-up of diabetes    DIABETES MANAGEMENT:  A1c is 6.1. Diabetic eye exam showed no retinopathy in either eye.    CURRENT SYMPTOMS:  She experiences intermittent headaches and arthritis. She has occasional episodes of self-limited constipation.    RIGHT FOOT PAIN:  She experiences pain in the top of her right foot with slight swelling. She was previously evaluated by Dr. Atkins who suggested possible gout.    EAR CONCERNS:  She reports recurrent ear blockage with occasional mild pain due to wax buildup causing significant discomfort.    DIET:  She reports attempts to reduce consumption of junk food but experiences occasional intense cravings leading to overeating, which impacts weight management.    CURRENT MEDICATIONS:  She takes atorvastatin for cholesterol management, Valsartan twice daily (noting better efficacy than previous Lisinopril), Gabapentin for foot pain and tingling (recently advised to increase to two pills due to severe pain), and Ozempic which was recently refilled.    LABS:  Complete blood count, liver function, kidney function, and uric acid tests are normal. Cholesterol was normal less than a year ago. She had a bladder infection in January.    IMMUNIZATIONS:  Recent COVID and shingles vaccines received. Last tetanus vaccine was in July 2015.       Patient Outreach on 02/14/2025   Component Date Value Ref Range Status    Left Eye DM Retinopathy 01/31/2025 Negative   Final    Right Eye DM Retinopathy 01/31/2025 Negative   Final   Office Visit on 02/10/2025   Component Date Value Ref Range Status    Uric Acid 02/10/2025 5.4  2.5 - 7.0 mg/dL Final    Hemoglobin A1C 02/10/2025 6.1 (H)  <5.7 % of total Hgb Final   Admission on 01/21/2025, Discharged on 01/21/2025   Component Date Value  Ref Range Status    WBC 01/21/2025 8.41  3.90 - 12.70 K/uL Final    RBC 01/21/2025 4.68  4.00 - 5.40 M/uL Final    Hemoglobin 01/21/2025 14.2  12.0 - 16.0 g/dL Final    Hematocrit 01/21/2025 43.0  37.0 - 48.5 % Final    MCV 01/21/2025 92  82 - 98 fL Final    MCH 01/21/2025 30.3  27.0 - 31.0 pg Final    MCHC 01/21/2025 33.0  32.0 - 36.0 g/dL Final    RDW 01/21/2025 13.2  11.5 - 14.5 % Final    Platelets 01/21/2025 284  150 - 450 K/uL Final    MPV 01/21/2025 11.4  9.2 - 12.9 fL Final    Immature Granulocytes 01/21/2025 0.4  0.0 - 0.5 % Final    Gran # (ANC) 01/21/2025 4.7  1.8 - 7.7 K/uL Final    Immature Grans (Abs) 01/21/2025 0.03  0.00 - 0.04 K/uL Final    Lymph # 01/21/2025 2.7  1.0 - 4.8 K/uL Final    Mono # 01/21/2025 0.9  0.3 - 1.0 K/uL Final    Eos # 01/21/2025 0.1  0.0 - 0.5 K/uL Final    Baso # 01/21/2025 0.03  0.00 - 0.20 K/uL Final    nRBC 01/21/2025 0  0 /100 WBC Final    Gran % 01/21/2025 56.1  38.0 - 73.0 % Final    Lymph % 01/21/2025 31.6  18.0 - 48.0 % Final    Mono % 01/21/2025 10.3  4.0 - 15.0 % Final    Eosinophil % 01/21/2025 1.2  0.0 - 8.0 % Final    Basophil % 01/21/2025 0.4  0.0 - 1.9 % Final    Differential Method 01/21/2025 Automated   Final    Lipase 01/21/2025 37  4 - 60 U/L Final    Specimen UA 01/21/2025 Urine, Clean Catch   Final    Color, UA 01/21/2025 Orange (A)  Yellow, Straw, Aisha Final    Appearance, UA 01/21/2025 Cloudy (A)  Clear Final    pH, UA 01/21/2025 6.0  5.0 - 8.0 Final    Specific Gravity, UA 01/21/2025 1.020  1.005 - 1.030 Final    Protein, UA 01/21/2025 1+ (A)  Negative Final    Glucose, UA 01/21/2025 Negative  Negative Final    Ketones, UA 01/21/2025 Negative  Negative Final    Bilirubin (UA) 01/21/2025 Negative  Negative Final    Occult Blood UA 01/21/2025 3+ (A)  Negative Final    Nitrite, UA 01/21/2025 Negative  Negative Final    Urobilinogen, UA 01/21/2025 Negative  Negative EU/dL Final    Leukocytes, UA 01/21/2025 3+ (A)  Negative Final    Sodium 01/21/2025 135  (L)  136 - 145 mmol/L Final    Potassium 01/21/2025 4.1  3.5 - 5.1 mmol/L Final    Chloride 01/21/2025 104  95 - 110 mmol/L Final    CO2 01/21/2025 24  23 - 29 mmol/L Final    Glucose 01/21/2025 100  70 - 110 mg/dL Final    BUN 01/21/2025 15  8 - 23 mg/dL Final    Creatinine 01/21/2025 1.0  0.5 - 1.4 mg/dL Final    Calcium 01/21/2025 8.7  8.7 - 10.5 mg/dL Final    Total Protein 01/21/2025 6.4  6.0 - 8.4 g/dL Final    Albumin 01/21/2025 3.7  3.5 - 5.2 g/dL Final    Total Bilirubin 01/21/2025 0.4  0.1 - 1.0 mg/dL Final    Alkaline Phosphatase 01/21/2025 66  55 - 135 U/L Final    AST 01/21/2025 10  10 - 40 U/L Final    ALT 01/21/2025 11  10 - 44 U/L Final    eGFR 01/21/2025 >60.0  >60 mL/min/1.73 m^2 Final    Anion Gap 01/21/2025 7 (L)  8 - 16 mmol/L Final    RBC, UA 01/21/2025 >100 (H)  0 - 4 /hpf Final    WBC, UA 01/21/2025 >100 (H)  0 - 5 /hpf Final    WBC Clumps, UA 01/21/2025 Many (A)  None-Rare Final    Bacteria 01/21/2025 Few (A)  None-Occ /hpf Final    Yeast, UA 01/21/2025 Few (A)  None Final    Squam Epithel, UA 01/21/2025 14  /hpf Final    Non-Squam Epith 01/21/2025 2 (A)  <1/hpf /hpf Final    Hyaline Casts, UA 01/21/2025 0  0-1/lpf /lpf Final    Microscopic Comment 01/21/2025 SEE COMMENT   Final    Urine Culture, Routine 01/21/2025 No growth   Final      Past Medical History:   Diagnosis Date    Abnormal heart rhythm     Allergy     Aneurysm of abdominal vessel 2020    Arthritis     Coronary artery disease     Depression     Diabetes mellitus, type 2     Essential hypertension 08/16/2016    Pure hypercholesterolemia 08/25/2023    Right sided sciatica 04/23/2018     Past Surgical History:   Procedure Laterality Date    CHOLECYSTECTOMY      FRACTURE SURGERY      HYSTERECTOMY      INJECTION OF ANESTHETIC AGENT AROUND MEDIAL BRANCH NERVES INNERVATING LUMBAR FACET JOINT Bilateral 7/5/2018    Procedure: Block-nerve-medial branch-lumbar;  Surgeon: Arthur Orellana MD;  Location: [facility];  Service: Pain Management;   "Laterality: Bilateral;  L2, 3, 4, 5    TONSILLECTOMY       Family History   Family history unknown: Yes       Marital Status:   Alcohol History:  reports no history of alcohol use.  Tobacco History:  reports that she has never smoked. She has never used smokeless tobacco.  Drug History:  reports no history of drug use.    Review of patient's allergies indicates:   Allergen Reactions    Phenytoin Hives    Dilantin [phenytoin sodium extended] Rash    Lovenox [enoxaparin] Rash and Hives     Current Medications[1]    Review of Systems   Constitutional:  Negative for activity change and unexpected weight change.   HENT:  Negative for hearing loss, rhinorrhea and trouble swallowing.    Eyes:  Negative for discharge and visual disturbance.   Respiratory:  Negative for chest tightness and wheezing.    Cardiovascular:  Negative for chest pain and palpitations.   Gastrointestinal:  Positive for constipation. Negative for blood in stool, diarrhea and vomiting.   Endocrine: Negative for polydipsia and polyuria.   Genitourinary:  Negative for difficulty urinating, dysuria, hematuria and menstrual problem.   Musculoskeletal:  Positive for arthralgias. Negative for joint swelling and neck pain.   Neurological:  Positive for headaches. Negative for weakness.   Psychiatric/Behavioral:  Negative for confusion and dysphoric mood.           Objective:          2/27/2025     2:45 PM 3/26/2025     3:39 PM   Vitals - 1 value per visit   SYSTOLIC 134 122   DIASTOLIC 80 64   Pulse 67 97   SPO2 95 % 99 %   Weight (lb) 309.4 307   Weight (kg) 140.343 139.254   Height 5' 9" (1.753 m) 5' 9" (1.753 m)   BMI (Calculated) 45.7 45.3   Pain Score Zero       Physical Exam  Constitutional:       Appearance: Normal appearance.   HENT:      Head: Normocephalic and atraumatic.      Mouth/Throat:      Mouth: Mucous membranes are moist.   Eyes:      Conjunctiva/sclera: Conjunctivae normal.   Pulmonary:      Effort: Pulmonary effort is normal. "   Neurological:      General: No focal deficit present.      Mental Status: She is alert and oriented to person, place, and time.   Psychiatric:         Mood and Affect: Mood normal.         Behavior: Behavior normal.             Assessment:       Assessment & Plan        IMPRESSION:  - A1c 6.1, indicating well-controlled diabetes.  - Liver and renal function appear normal.  - Uric acid levels normal, ruling out gout as cause of foot pain.  - Arthritis likely cause of right foot pain.  - BP and HLD well-controlled on current medications.  - Chronic constipation requires resumption of regular medication regimen.  - Minimal earwax buildup.    TYPE 2 DIABETES MELLITUS:  - Continued Ozempic for diabetes management.  - Conducted a six-month follow-up for diabetes with lab work.  - Noted A1c level of 6.1, which is considered very good.  - Performed diabetic eye exam, which showed no retinopathy in either eye.  - Confirmed diabetes is well controlled.  - Acknowledged that patient's diabetes is not badly controlled, but warned about the impact of junk food on weight.    HYPERTENSION:  - Continued Valsartan  for blood pressure management.  - Noted blood pressure is currently good and well-controlled with current medications.    HYPERLIPIDEMIA:  - Continued Atorvastatin for cholesterol management.  - Reviewed lipid panel from less than a year ago, which was completely normal.  - Confirmed hyperlipidemia is well controlled.    OBESITY:  - Recommend reducing junk food and snacking to address obesity.  - Noted patient's weight is about the same as it's been in the past year.  - Acknowledged obesity as an ongoing issue.    - OSTEOARTHRITIS OF THE FOOT:  - Referred patient to podiatry for right foot pain evaluation.  - Noted patient reports pain in the top of the right foot.  - Performed uric acid test to check for gout which was negative  - Suspected it might be arthritis rather than gout.  -  IMPACTED CERUMEN:  - Educated  patient on proper ear cleaning technique.  - Advised against use of Q-tips.  - Recommend gentle cleaning with a cloth.  - Noted patient reports ears getting clogged up with wax.  - Examined ears and found minimal wax buildup.  - Recommend OTC ear flushing products.    CONSTIPATION:  - Explained importance of regular bowel medication use to prevent discomfort.  - Advised to resume constipation medication (capsule) every other day in the evening.  - Noted patient reports having constipation.  - Discussed timing of medication to manage constipation.  - Prescribed a high-dose capsule medication for constipation.             Plan:       Type 2 diabetes mellitus without complication, without long-term current use of insulin  -     Comprehensive Metabolic Panel; Future; Expected date: 09/25/2025  -     Hemoglobin A1C; Future; Expected date: 09/25/2025  -     Microalbumin/Creatinine Ratio, Urine; Future; Expected date: 09/25/2025    Essential hypertension  -     CBC Auto Differential; Future; Expected date: 09/25/2025  -     Comprehensive Metabolic Panel; Future; Expected date: 09/25/2025    Pure hypercholesterolemia  -     atorvastatin (LIPITOR) 20 MG tablet; Take 1 tablet (20 mg total) by mouth once daily.  Dispense: 90 tablet; Refill: 3  -     Comprehensive Metabolic Panel; Future; Expected date: 09/25/2025  -     Lipid Panel; Future; Expected date: 09/25/2025    Chronic constipation    Right foot pain  -     Ambulatory referral/consult to Podiatry; Future; Expected date: 04/02/2025    Morbid obesity      Medication List with Changes/Refills   Current Medications    ASPIRIN (ECOTRIN) 81 MG EC TABLET    Take 81 mg by mouth once daily.    BLOOD SUGAR DIAGNOSTIC STRP    To check BG 1 times daily, to use with insurance preferred meter    BLOOD-GLUCOSE METER KIT    Use as instructed    CARVEDILOL (COREG) 3.125 MG TABLET    Take 3.125 mg by mouth 2 (two) times daily with meals.    GABAPENTIN (NEURONTIN) 100 MG CAPSULE    Take  2 capsules (200 mg total) by mouth 3 (three) times daily as needed (neuropathy and sciatic pain).    LANCETS 32 GAUGE MISC    Test glucose twice daily    LINACLOTIDE (LINZESS) 290 MCG CAP CAPSULE    Take 290 mcg by mouth before breakfast.    RESTASIS 0.05 % OPHTHALMIC EMULSION    Place 1 drop into both eyes 2 (two) times daily.    SEMAGLUTIDE (OZEMPIC) 2 MG/DOSE (8 MG/3 ML) PNIJ    Inject 2 mg into the skin every 7 days.    VALSARTAN (DIOVAN) 40 MG TABLET    Take 1 tablet (40 mg total) by mouth once daily.   Changed and/or Refilled Medications    Modified Medication Previous Medication    ATORVASTATIN (LIPITOR) 20 MG TABLET atorvastatin (LIPITOR) 20 MG tablet       Take 1 tablet (20 mg total) by mouth once daily.    Take 1 tablet (20 mg total) by mouth once daily.      Follow up in about 6 months (around 9/26/2025) for Diabetic Check-Up, HTN.      Stable on medications continue current    This note was generated with the assistance of ambient listening technology. Verbal consent was obtained by the patient and accompanying visitor(s) for the recording of patient appointment to facilitate this note. I attest to having reviewed and edited the generated note for accuracy, though some syntax or spelling errors may persist. Please contact the author of this note for any clarification.               [1]   Current Outpatient Medications:     aspirin (ECOTRIN) 81 MG EC tablet, Take 81 mg by mouth once daily., Disp: , Rfl:     blood sugar diagnostic Strp, To check BG 1 times daily, to use with insurance preferred meter, Disp: 200 each, Rfl: 5    blood-glucose meter kit, Use as instructed, Disp: 1 each, Rfl: 0    carvediloL (COREG) 3.125 MG tablet, Take 3.125 mg by mouth 2 (two) times daily with meals., Disp: , Rfl:     gabapentin (NEURONTIN) 100 MG capsule, Take 2 capsules (200 mg total) by mouth 3 (three) times daily as needed (neuropathy and sciatic pain)., Disp: 90 capsule, Rfl: 11    lancets 32 gauge Misc, Test glucose  twice daily, Disp: 200 each, Rfl: 3    linaCLOtide (LINZESS) 290 mcg Cap capsule, Take 290 mcg by mouth before breakfast., Disp: , Rfl:     RESTASIS 0.05 % ophthalmic emulsion, Place 1 drop into both eyes 2 (two) times daily., Disp: , Rfl:     semaglutide (OZEMPIC) 2 mg/dose (8 mg/3 mL) PnIj, Inject 2 mg into the skin every 7 days., Disp: 3 mL, Rfl: 11    valsartan (DIOVAN) 40 MG tablet, Take 1 tablet (40 mg total) by mouth once daily., Disp: 90 tablet, Rfl: 3    atorvastatin (LIPITOR) 20 MG tablet, Take 1 tablet (20 mg total) by mouth once daily., Disp: 90 tablet, Rfl: 3

## 2025-05-30 ENCOUNTER — OFFICE VISIT (OUTPATIENT)
Dept: PODIATRY | Facility: CLINIC | Age: 65
End: 2025-05-30
Payer: MEDICARE

## 2025-05-30 VITALS — BODY MASS INDEX: 43.4 KG/M2 | WEIGHT: 293 LBS | HEART RATE: 87 BPM | OXYGEN SATURATION: 98 % | HEIGHT: 69 IN

## 2025-05-30 DIAGNOSIS — B35.3 TINEA PEDIS OF BOTH FEET: ICD-10-CM

## 2025-05-30 DIAGNOSIS — B35.1 ONYCHOMYCOSIS DUE TO DERMATOPHYTE: ICD-10-CM

## 2025-05-30 DIAGNOSIS — M54.16 LUMBAR RADICULOPATHY: ICD-10-CM

## 2025-05-30 DIAGNOSIS — E11.42 DIABETIC POLYNEUROPATHY ASSOCIATED WITH TYPE 2 DIABETES MELLITUS: Primary | ICD-10-CM

## 2025-05-30 PROCEDURE — 99999 PR PBB SHADOW E&M-EST. PATIENT-LVL III: CPT | Mod: PBBFAC,,, | Performed by: PODIATRIST

## 2025-05-30 RX ORDER — FLUCONAZOLE 200 MG/1
200 TABLET ORAL WEEKLY
Qty: 28 TABLET | Refills: 0 | Status: SHIPPED | OUTPATIENT
Start: 2025-05-30 | End: 2025-12-06

## 2025-05-30 NOTE — PROGRESS NOTES
"    [address] [facility] [address]  [place] [address]  Phone: [phone]   [phone]    Patient's PCP:Katya Hummel MD  Referring Provider: No ref. provider found    Subjective:      Chief Complaint:: Diabetic Foot Exam and Diabetes Mellitus    HPI  Martha Miguel is a 64 y.o. female who presents today for a diabetic foot exam.  Pt has seen  on 3/26/25 who treats them for their diabetes.  Pt has been a diabetic for about 10 years.  Taking ozempic to treat diabetes.  Patient also complains of dryness in both feet.    Blood sugar: not taken  Hemoglobin A1C: 6.1        Vitals:    05/30/25 0957   Pulse: 87   SpO2: 98%   Weight: (!) 139.8 kg (308 lb 3.3 oz)   Height: 5' 9" (1.753 m)   PainSc:   5      Shoe Size: 10    Past Surgical History:   Procedure Laterality Date    CHOLECYSTECTOMY      FRACTURE SURGERY      HYSTERECTOMY      INJECTION OF ANESTHETIC AGENT AROUND MEDIAL BRANCH NERVES INNERVATING LUMBAR FACET JOINT Bilateral 7/5/2018    Procedure: Block-nerve-medial branch-lumbar;  Surgeon: Arthur Orellana MD;  Location: [facility] OR;  Service: Pain Management;  Laterality: Bilateral;  L2, 3, 4, 5    TONSILLECTOMY       Past Medical History:   Diagnosis Date    Abnormal heart rhythm     Allergy     Aneurysm of abdominal vessel 2020    Arthritis     Coronary artery disease     Depression     Diabetes mellitus, type 2     Essential hypertension 08/16/2016    Pure hypercholesterolemia 08/25/2023    Right sided sciatica 04/23/2018     Family History   Family history unknown: Yes        Social History:   Marital Status:   Alcohol History:  reports no history of alcohol use.  Tobacco History:  reports that she has never smoked. She has never used smokeless tobacco.  Drug History:  reports no history of drug use.    Review of patient's allergies indicates:   Allergen Reactions    Phenytoin Hives    Dilantin [phenytoin sodium extended] Rash    Lovenox [enoxaparin] Rash and Hives       Current " Medications[1]    Review of Systems   Constitutional:  Negative for chills, fatigue, fever and unexpected weight change.   HENT:  Negative for hearing loss and trouble swallowing.    Eyes:  Negative for photophobia and visual disturbance.   Respiratory:  Negative for cough, shortness of breath and wheezing.    Cardiovascular:  Positive for leg swelling. Negative for chest pain and palpitations.   Gastrointestinal:  Negative for abdominal pain and nausea.   Genitourinary:  Negative for dysuria and frequency.   Musculoskeletal:  Positive for back pain and joint swelling. Negative for arthralgias, gait problem, myalgias and neck pain.   Skin:  Negative for rash and wound.   Neurological:  Positive for numbness. Negative for tremors, seizures, weakness and headaches.   Hematological:  Does not bruise/bleed easily.         Objective:        Physical Exam:   Foot Exam    General  General Appearance: appears stated age and healthy   Orientation: alert and oriented to person, place, and time   Affect: appropriate   Gait: antalgic       Right Foot/Ankle     Inspection and Palpation  Ecchymosis: none  Tenderness: none   Swelling: (Plus one pitting edema lower extremity)  Arch: pes planus  Hammertoes: second toe, third toe, fourth toe and fifth toe  Claw Toes: absent  Hallux valgus: no  Hallux limitus: no  Skin Exam: dry skin and tinea;   Fungus Toenails: present    Neurovascular  Dorsalis pedis: 2+  Posterior tibial: 2+  Capillary Refill: 2+  Saphenous nerve sensation: diminished  Tibial nerve sensation: diminished  Superficial peroneal nerve sensation: diminished  Deep peroneal nerve sensation: diminished  Sural nerve sensation: diminished    Muscle Strength  Ankle dorsiflexion: 5  Ankle plantar flexion: 5  Ankle inversion: 5  Ankle eversion: 5  Great toe extension: 5  Great toe flexion: 5    Range of Motion    Normal right ankle ROM  Passive  Ankle dorsiflexion: 10    Active  Ankle dorsiflexion: 10    Tests  Calcaneal  squeeze: negative   PT Tinel's sign: negative    Paresthesia: positive  Comments  Pain on palpation of the infeior medial heel and central plantar heel. No pain present  with side to side compression of the calcaneus. Negative tinnel's sign  at the tarsal tunnel. Negative Chang's nerve pain. Negative Calcaneal nerve pain. No soft tissue masses. Pain present with dorsiflexion of the ankle. No edema, erythema, or ecchymosis noted.      Left Foot/Ankle      Inspection and Palpation  Ecchymosis: none  Tenderness: none   Swelling: ankle (+2 pitting edema lower extremity)  Arch: pes planus  Hammertoes: second toe, third toe, fourth toe and fifth toe  Claw toes: absent  Hallux valgus: no  Hallux limitus: no  Skin Exam: dry skin and tinea;   Fungus Toenails: present    Neurovascular  Dorsalis pedis: 2+  Posterior tibial: 2+  Capillary refill: 2+  Saphenous nerve sensation: diminished  Tibial nerve sensation: diminished  Superficial peroneal nerve sensation: diminished  Deep peroneal nerve sensation: diminished  Sural nerve sensation: diminished    Muscle Strength  Ankle dorsiflexion: 5  Ankle plantar flexion: 5  Ankle inversion: 5  Ankle eversion: 5  Great toe extension: 5  Great toe flexion: 5    Range of Motion    Normal left ankle ROM  Passive  Ankle dorsiflexion: 5    Active  Ankle dorsiflexion: 5    Tests  Calcaneal squeeze: negative   PT Tinel's sign: negative  Paresthesia: positive  Comments  Pain on palpation of the infeior medial heel and central plantar heel. No pain present  with side to side compression of the calcaneus. Negative tinnel's sign  at the tarsal tunnel. Negative Chang's nerve pain. Negative Calcaneal nerve pain. No soft tissue masses. Pain present with dorsiflexion of the ankle. No edema, erythema, or ecchymosis noted.      Decreased range of motion crepitus and pain and swelling ankle secondary to arthritis from previous ankle fracture    Physical Exam  Cardiovascular:      Pulses:            Dorsalis pedis pulses are 2+ on the right side and 2+ on the left side.        Posterior tibial pulses are 2+ on the right side and 2+ on the left side.   Musculoskeletal:      Left ankle: Swelling present.      Right foot: No bunion.      Left foot: No bunion.   Feet:      Right foot:      Skin integrity: Dry skin present.      Toenail Condition: Fungal disease present.     Left foot:      Skin integrity: Dry skin present.      Toenail Condition: Fungal disease present.        Imaging: none            Assessment:       1. Diabetic polyneuropathy associated with type 2 diabetes mellitus    2. Onychomycosis due to dermatophyte    3. Tinea pedis of both feet    4. Lumbar radiculopathy      Plan:   Diabetic polyneuropathy associated with type 2 diabetes mellitus    Onychomycosis due to dermatophyte  -     fluconazole (DIFLUCAN) 200 MG Tab; Take 1 tablet (200 mg total) by mouth once a week. for 28 doses  Dispense: 28 tablet; Refill: 0    Tinea pedis of both feet  -     fluconazole (DIFLUCAN) 200 MG Tab; Take 1 tablet (200 mg total) by mouth once a week. for 28 doses  Dispense: 28 tablet; Refill: 0    Lumbar radiculopathy      Follow up in about 1 year (around 5/30/2026), or if symptoms worsen or fail to improve.    Procedures        Counseled patient on the aspects of diabetes and how it pertains to the feet.  I explained the importance of proper diabetic foot care and how it is essential for the health of their feet.    I discussed the importance of knowing their HGA1c and that the level needs to be as close to 6 as possible.  I discussed the increase complications of high blood sugar including stroke, blindness, heart attack, kidney failure and loss of limb secondary to neuropathy and PVD.    Patient  was made aware of inspecting their feet.  Patient was told to be aware of any breaks in the skin or redness.  With neuropathy, these areas are not recognized early due to the numbness.  I discussed different treatments  available to control the symptoms but whcih may not cure the problem.      Shoe inspection. Patient instructed on proper foot hygeine. We discussed wearing proper shoe gear, daily foot inspections, never walking without protective shoe gear, never putting sharp instruments to feet.    Fungal infection of toenails explained. Treatment options including no treatment, periodic debridement, topical medications, oral medications, and removal of the nail were discussed, as well as success rates and risks of recurrence.  I am sending in fluconazole for her for her bilateral nail fungus and tinea pedis.    Counseling:     I provided patient education verbally regarding:   Patient diagnosis, treatment options, as well as alternatives, risks, and benefits.     This note was created using Dragon voice recognition software that occasionally misinterpreted phrases or words.                      [1]   Current Outpatient Medications   Medication Sig Dispense Refill    aspirin (ECOTRIN) 81 MG EC tablet Take 81 mg by mouth once daily.      atorvastatin (LIPITOR) 20 MG tablet Take 1 tablet (20 mg total) by mouth once daily. 90 tablet 3    blood sugar diagnostic Strp To check BG 1 times daily, to use with insurance preferred meter 200 each 5    carvediloL (COREG) 3.125 MG tablet Take 3.125 mg by mouth 2 (two) times daily with meals.      lancets 32 gauge Misc Test glucose twice daily 200 each 3    linaCLOtide (LINZESS) 290 mcg Cap capsule Take 290 mcg by mouth before breakfast.      RESTASIS 0.05 % ophthalmic emulsion Place 1 drop into both eyes 2 (two) times daily.      semaglutide (OZEMPIC) 2 mg/dose (8 mg/3 mL) PnIj Inject 2 mg into the skin every 7 days. 3 mL 11    valsartan (DIOVAN) 40 MG tablet Take 1 tablet (40 mg total) by mouth once daily. 90 tablet 3    blood-glucose meter kit Use as instructed 1 each 0    fluconazole (DIFLUCAN) 200 MG Tab Take 1 tablet (200 mg total) by mouth once a week. for 28 doses 28 tablet 0     gabapentin (NEURONTIN) 100 MG capsule Take 2 capsules (200 mg total) by mouth 3 (three) times daily as needed (neuropathy and sciatic pain). (Patient not taking: Reported on 5/30/2025) 90 capsule 11     No current facility-administered medications for this visit.

## 2025-06-03 LAB
LEFT EYE DM RETINOPATHY: NEGATIVE
RIGHT EYE DM RETINOPATHY: NEGATIVE

## 2025-07-15 DIAGNOSIS — E11.9 TYPE 2 DIABETES MELLITUS WITHOUT COMPLICATION, WITHOUT LONG-TERM CURRENT USE OF INSULIN: ICD-10-CM

## 2025-07-16 RX ORDER — SEMAGLUTIDE 2.68 MG/ML
2 INJECTION, SOLUTION SUBCUTANEOUS
Qty: 3 ML | Refills: 11 | Status: SHIPPED | OUTPATIENT
Start: 2025-07-16 | End: 2026-07-16

## 2025-07-16 NOTE — TELEPHONE ENCOUNTER
prescription sent to   [facility] [address] - [place] [address] [facility] [facility]  [address] [facility]  [place] [address]  Phone: [phone] Fax: [phone]    
Forward flexed posture./decreased sarah

## 2025-08-11 ENCOUNTER — TELEPHONE (OUTPATIENT)
Dept: FAMILY MEDICINE | Facility: CLINIC | Age: 65
End: 2025-08-11
Payer: MEDICARE

## 2025-08-11 ENCOUNTER — TELEPHONE (OUTPATIENT)
Dept: PULMONOLOGY | Facility: CLINIC | Age: 65
End: 2025-08-11
Payer: MEDICARE

## 2025-08-27 ENCOUNTER — PATIENT MESSAGE (OUTPATIENT)
Dept: PULMONOLOGY | Facility: CLINIC | Age: 65
End: 2025-08-27
Payer: MEDICARE

## (undated) DEVICE — SYR 10CC LUER LOCK

## (undated) DEVICE — NDL HYPODERMIC BLUNT 18G 1.5IN

## (undated) DEVICE — GLOVE SURG ULTRA TOUCH 7.5

## (undated) DEVICE — NDL SPINAL SPINOCAN 22GX3.5

## (undated) DEVICE — NDL SAFETY 25G X 1.5 ECLIPSE

## (undated) DEVICE — SYS LABEL CORRECT MED

## (undated) DEVICE — APPLICATOR CHLORAPREP CLR 10.5